# Patient Record
Sex: FEMALE | Race: OTHER | NOT HISPANIC OR LATINO | Employment: FULL TIME | URBAN - METROPOLITAN AREA
[De-identification: names, ages, dates, MRNs, and addresses within clinical notes are randomized per-mention and may not be internally consistent; named-entity substitution may affect disease eponyms.]

---

## 2017-04-13 ENCOUNTER — OFFICE VISIT (OUTPATIENT)
Dept: MEDICAL GROUP | Facility: MEDICAL CENTER | Age: 27
End: 2017-04-13
Payer: COMMERCIAL

## 2017-04-13 VITALS
SYSTOLIC BLOOD PRESSURE: 110 MMHG | BODY MASS INDEX: 24.59 KG/M2 | WEIGHT: 144 LBS | DIASTOLIC BLOOD PRESSURE: 70 MMHG | TEMPERATURE: 97.9 F | HEIGHT: 64 IN | OXYGEN SATURATION: 98 % | RESPIRATION RATE: 14 BRPM | HEART RATE: 60 BPM

## 2017-04-13 DIAGNOSIS — Z00.00 ANNUAL PHYSICAL EXAM: ICD-10-CM

## 2017-04-13 PROCEDURE — 90632 HEPA VACCINE ADULT IM: CPT | Performed by: PHYSICIAN ASSISTANT

## 2017-04-13 PROCEDURE — 99385 PREV VISIT NEW AGE 18-39: CPT | Mod: 25 | Performed by: PHYSICIAN ASSISTANT

## 2017-04-13 PROCEDURE — 90471 IMMUNIZATION ADMIN: CPT | Performed by: PHYSICIAN ASSISTANT

## 2017-04-13 ASSESSMENT — PATIENT HEALTH QUESTIONNAIRE - PHQ9: CLINICAL INTERPRETATION OF PHQ2 SCORE: 0

## 2017-04-13 NOTE — ASSESSMENT & PLAN NOTE
This is a 27-year-old female accompanied by her 5-year-old daughter. She is here today for some labs. There is a question about her vaccination status. She does not have her vaccination records. He is also requesting a QuantiFERON testing for TB. No complaints today. No significant medical history. Was born in the [facility].

## 2017-04-13 NOTE — PROGRESS NOTES
"Subjective:   Shara Horner is a 27 y.o. female here today for QuantiFERON order and status of vaccination.    Annual physical exam  This is a 27-year-old female accompanied by her 5-year-old daughter. She is here today for some labs. There is a question about her vaccination status. She does not have her vaccination records. He is also requesting a QuantiFERON testing for TB. No complaints today. No significant medical history. Was born in the [facility].       Current medicines (including changes today)  Current Outpatient Prescriptions   Medication Sig Dispense Refill   • Levonorgestrel (LILETTA, 52 MG,) 18.6 MCG/DAY IUD by Intrauterine route.       No current facility-administered medications for this visit.     She  has no past medical history of Allergy, Anemia, Anxiety, Arrhythmia, Arthritis, ASTHMA, Blood transfusion, Cancer (CMS-[facility]), Clotting disorder (CMS-[facility]), Depression, Diabetes, EMPHYSEMA, GERD (gastroesophageal reflux disease), Headache(784.0), Migraine, Heart attack (CMS-[facility]), Heart murmur, HIV (human immunodeficiency virus infection), Parathyroid disorder (CMS-[facility]), Thyroid disease, Hypertension, Kidney disease, OSTEOPOROSIS, Seizure (CMS-[facility]), Sickle cell disease (CMS-[facility]), Stroke (CMS-[facility]), Substance abuse, Tuberculosis, Ulcer (CMS-[facility]), Urinary tract infection, site not specified, Addisons disease (CMS-[facility]), Adrenal disorder (CMS-[facility]), CATARACT, CHF (congestive heart failure) (CMS-[facility]), COPD, Cushings syndrome (CMS-[facility]), Diabetic neuropathy (CMS-[facility]), Glaucoma, Goiter, Hyperlipidemia, IBD (inflammatory bowel disease), Meningitis, Muscle disorder, or Pituitary disease (CMS-[facility]).    ROS   No chest pain, no shortness of breath, no abdominal pain and all other systems were reviewed and are negative.       Objective:     Blood pressure 110/70, pulse 60, temperature 36.6 °C (97.9 °F), resp. rate 14, height 1.626 m (5' 4.02\"), weight 65.318 kg (144 lb), SpO2 98 %, not currently breastfeeding. Body " mass index is 24.71 kg/(m^2).   Physical Exam:  Constitutional: Alert, no distress.  Skin: Warm, dry, good turgor, no rashes in visible areas.  Eye: Equal, round and reactive, conjunctiva clear, lids normal.  ENMT: Lips without lesions, good dentition, oropharynx clear.  Neck: Trachea midline, no masses.   Respiratory: Unlabored respiratory effort, lungs appear clear, no wheezes.  Cardiovascular: Regular rate and rhythm.   Psych: Alert and oriented x3, normal affect and mood.    Vaccination records were pulled from [employer].  She is only missing hepatitis a #2.    Assessment and Plan:   The following treatment plan was discussed    1. Annual physical exam  Administered hepatitis A #2 into right deltoid without complaints. Ordered QuantiFERON testing. Provided a copy of her up-to-date vaccination records. Discussed with immunity status.  - Quantiferon Gold TB (PPD); Future  - Hep A Adult 19+      Followup: Return if symptoms worsen or fail to improve.    Please note that this dictation was created using voice recognition software. I have made every reasonable attempt to correct obvious errors, but I expect that there are errors of grammar and possibly content that I did not discover before finalizing the note.

## 2017-04-25 ENCOUNTER — TELEPHONE (OUTPATIENT)
Dept: MEDICAL GROUP | Facility: MEDICAL CENTER | Age: 27
End: 2017-04-25

## 2017-04-25 LAB
ANNOTATION COMMENT IMP: NORMAL
GAMMA INTERFERON BACKGROUND BLD IA-ACNC: 0.12 IU/ML
GAMMA INTERFERON BACKGROUND BLD IA-ACNC: NORMAL [IU]/ML
M TB IFN-G BLD-IMP: NEGATIVE
M TB IFN-G CD4+ BCKGRND COR BLD-ACNC: 0.02 IU/ML
M TB IFN-G CD4+ T-CELLS BLD-ACNC: 0.14 IU/ML
MITOGEN IGNF BLD-ACNC: >10 IU/ML
SERVICE CMNT-IMP: NORMAL

## 2017-04-25 NOTE — TELEPHONE ENCOUNTER
----- Message from Alvin Mcfarlane PA-C sent at 4/25/2017  2:03 PM PDT -----  Please contact patient and notify her that QuantiFERON testing is negative. Thank you.

## 2017-04-25 NOTE — Clinical Note
April 25, 2017        Shara ZarateIs  [id number]  [place] [address]        Dear Shara    Here are the results of your test(s):   QuantiFERON: negative    Comments:  Looks good!        Sincerely,        Maria E Mckinney, Medical Assistant for Denys Esquivel PA-C  Signed Electronically

## 2017-09-06 ENCOUNTER — HOSPITAL ENCOUNTER (EMERGENCY)
Facility: MEDICAL CENTER | Age: 27
End: 2017-09-07
Attending: EMERGENCY MEDICINE
Payer: MEDICAID

## 2017-09-06 DIAGNOSIS — N73.9 PID (PELVIC INFLAMMATORY DISEASE): ICD-10-CM

## 2017-09-06 DIAGNOSIS — M79.672 LEFT FOOT PAIN: ICD-10-CM

## 2017-09-06 LAB — TREPONEMA PALLIDUM IGG+IGM AB [PRESENCE] IN SERUM OR PLASMA BY IMMUNOASSAY: NON REACTIVE

## 2017-09-06 PROCEDURE — 86780 TREPONEMA PALLIDUM: CPT

## 2017-09-06 PROCEDURE — 87389 HIV-1 AG W/HIV-1&-2 AB AG IA: CPT

## 2017-09-06 PROCEDURE — 96372 THER/PROPH/DIAG INJ SC/IM: CPT

## 2017-09-06 PROCEDURE — 36415 COLL VENOUS BLD VENIPUNCTURE: CPT

## 2017-09-06 PROCEDURE — 99285 EMERGENCY DEPT VISIT HI MDM: CPT

## 2017-09-06 RX ORDER — CEFTRIAXONE SODIUM 250 MG/1
250 INJECTION, POWDER, FOR SOLUTION INTRAMUSCULAR; INTRAVENOUS ONCE
Status: COMPLETED | OUTPATIENT
Start: 2017-09-07 | End: 2017-09-07

## 2017-09-06 RX ORDER — AZITHROMYCIN 250 MG/1
250 TABLET, FILM COATED ORAL ONCE
Status: DISCONTINUED | OUTPATIENT
Start: 2017-09-07 | End: 2017-09-07

## 2017-09-06 RX ORDER — AZITHROMYCIN 250 MG/1
1000 TABLET, FILM COATED ORAL ONCE
Status: COMPLETED | OUTPATIENT
Start: 2017-09-07 | End: 2017-09-07

## 2017-09-07 VITALS
WEIGHT: 143.3 LBS | HEIGHT: 64 IN | OXYGEN SATURATION: 100 % | SYSTOLIC BLOOD PRESSURE: 118 MMHG | HEART RATE: 70 BPM | BODY MASS INDEX: 24.46 KG/M2 | DIASTOLIC BLOOD PRESSURE: 78 MMHG | RESPIRATION RATE: 14 BRPM | TEMPERATURE: 97.3 F

## 2017-09-07 LAB
BACTERIA GENITAL QL WET PREP: NORMAL
HIV 1+2 AB+HIV1 P24 AG SERPL QL IA: NON REACTIVE
SIGNIFICANT IND 70042: NORMAL
SITE SITE: NORMAL
SOURCE SOURCE: NORMAL

## 2017-09-07 PROCEDURE — A9270 NON-COVERED ITEM OR SERVICE: HCPCS | Performed by: EMERGENCY MEDICINE

## 2017-09-07 PROCEDURE — 700102 HCHG RX REV CODE 250 W/ 637 OVERRIDE(OP): Performed by: EMERGENCY MEDICINE

## 2017-09-07 PROCEDURE — 700111 HCHG RX REV CODE 636 W/ 250 OVERRIDE (IP): Performed by: EMERGENCY MEDICINE

## 2017-09-07 RX ORDER — DOXYCYCLINE 100 MG/1
100 CAPSULE ORAL 2 TIMES DAILY
Qty: 14 CAP | Refills: 0 | Status: SHIPPED | OUTPATIENT
Start: 2017-09-07 | End: 2017-09-14

## 2017-09-07 RX ADMIN — AZITHROMYCIN 1000 MG: 250 TABLET, FILM COATED ORAL at 00:11

## 2017-09-07 RX ADMIN — CEFTRIAXONE SODIUM 250 MG: 250 INJECTION, POWDER, FOR SOLUTION INTRAMUSCULAR; INTRAVENOUS at 00:10

## 2017-09-07 NOTE — DISCHARGE INSTRUCTIONS
Pelvic Inflammatory Disease  Pelvic inflammatory disease (PID) refers to an infection in some or all of the female organs. The infection can be in the uterus, ovaries, fallopian tubes, or the surrounding tissues in the pelvis. PID can cause abdominal or pelvic pain that comes on suddenly (acute pelvic pain). PID is a serious infection because it can lead to lasting (chronic) pelvic pain or the inability to have children (infertility).   CAUSES   The infection is often caused by the normal bacteria found in the vaginal tissues. PID may also be caused by an infection that is spread during sexual contact. PID can also occur following:   · The birth of a baby.    · A miscarriage.    · An .    · Major pelvic surgery.    · The use of an intrauterine device (IUD).    · A sexual assault.    RISK FACTORS  Certain factors can put a person at higher risk for PID, such as:  · Being younger than 25 years.  · Being sexually active at a young age.  · Using nonbarrier contraception.  · Having multiple sexual partners.  · Having sex with someone who has symptoms of a genital infection.  · Using oral contraception.  Other times, certain behaviors can increase the possibility of getting PID, such as:  · Having sex during your period.  · Using a vaginal douche.  · Having an intrauterine device (IUD) in place.  SYMPTOMS   · Abdominal or pelvic pain.    · Fever.    · Chills.    · Abnormal vaginal discharge.  · Abnormal uterine bleeding.    · Unusual pain shortly after finishing your period.  DIAGNOSIS   Your caregiver will choose some of the following methods to make a diagnosis, such as:   · Performing a physical exam and history. A pelvic exam typically reveals a very tender uterus and surrounding pelvis.    · Ordering laboratory tests including a pregnancy test, blood tests, and urine test.   · Ordering cultures of the vagina and cervix to check for a sexually transmitted infection (STI).  · Performing an ultrasound.     · Performing a laparoscopic procedure to look inside the pelvis.  · Examining vaginal secretions under a microscope.  TREATMENT   · Antibiotic medicines may be prescribed and taken by mouth.    · Sexual partners may be treated when the infection is caused by a sexually transmitted disease (STD).    · Hospitalization may be needed to give antibiotics intravenously.  · Surgery may be needed, but this is rare.  It may take weeks until you are completely well. If you are diagnosed with PID, you should also be checked for human immunodeficiency virus (HIV). Your health care provider may test you for infection again 3 months after treatment.  HOME CARE INSTRUCTIONS   · If given, take your antibiotics as directed. Finish the medicine even if you start to feel better.    · Only take over-the-counter or prescription medicines for pain, discomfort, or fever as directed by your caregiver.    · Do not have sexual intercourse until treatment is completed or as directed by your caregiver. If PID is confirmed, your recent sexual partner(s) will need treatment.    · Keep your follow-up appointments.  SEEK MEDICAL CARE IF:   · You have increased or abnormal vaginal discharge.    · You need prescription medicine for your pain.    · You vomit.    · You cannot take your medicines.    · Your partner has an STD.    SEEK IMMEDIATE MEDICAL CARE IF:   · You have a fever.    · You have increased abdominal or pelvic pain.    · You have chills.    · You have pain when you urinate.    · You are not better after 72 hours following treatment.    MAKE SURE YOU:   · Understand these instructions.  · Will watch your condition.  · Will get help right away if you are not doing well or get worse.     This information is not intended to replace advice given to you by your health care provider. Make sure you discuss any questions you have with your health care provider.     Document Released: 12/18/2006 Document Revised: 01/08/2016 Document Reviewed:  01/25/2016  Elsevier Interactive Patient Education ©2016 [employer]

## 2017-09-07 NOTE — ED NOTES
Pt was diagnosed with Chlamydia about 2 months ago, states she has been on and off of antibiotics.

## 2017-09-07 NOTE — ED NOTES
"Chief Complaint   Patient presents with   • Foot Pain   • Rash     Patient ambulatory to triage with staggered gait. Patient states that she woke up with throbbing left foot pain since yesterday. Pain has not gotten better or worse. Patient also c/o rash/blisters to back of neck. Patient has been taking tylenol and zyrtec without relief. /84   Pulse 72   Temp 36.3 °C (97.3 °F)   Resp 16   Ht 1.626 m (5' 4\")   Wt 65 kg (143 lb 4.8 oz)   SpO2 100%   BMI 24.60 kg/m²     "

## 2017-09-07 NOTE — ED PROVIDER NOTES
"ED Provider Note    CHIEF COMPLAINT  Chief Complaint   Patient presents with   • Foot Pain   • Rash       HPI  Shara Horner is a 27 y.o. female who presentsTo the emergency department with multiple complaints. Primarily the 1st is that she's been having on and off vaginal discharge since July, she was diagnosed with bacterial vaginosis as well as chlamydia, she states that she took a gram of azithromycin as done Flagyl twice was still continues to have vaginal discharge and irritation. The 2nd complaint is that she did have some dysuria but has been started on Macrobid and states that that is greatly improved. The 3rd is that she noticed some swelling in the back part of her neck that she thought was a rash so she took some allergy medications this morning but denies ever actually seeing a rash having any pain or trauma to the back part of her neck. The 4th complaint is that she is having left foot pain at the sole of the foot without any trauma. The patient does transport other patients back and forth but denies having poor shoes being on her feet too much or any trauma the area. She denies any weakness but states on off she gets a shooting numbness down the leg and then \"freaked out\" and worries that something is wrong. She currently states the pain on her plantar part of her foot is making it difficult for her to ambulate    REVIEW OF SYSTEMS  See HPI for further details. All other systems are negative.     PAST MEDICAL HISTORY       SOCIAL HISTORY  Social History     Social History Main Topics   • Smoking status: Never Smoker   • Smokeless tobacco: Never Used   • Alcohol use Yes      Comment: socially   • Drug use: No   • Sexual activity: Yes     Partners: Male     Birth control/ protection: IUD       SURGICAL HISTORY   has a past surgical history that includes primary c section (3/13/2012).    CURRENT MEDICATIONS  Home Medications     Reviewed by Sandy Viera R.N. (Registered Nurse) on 09/06/17 at " "2150  Med List Status: <None>   Medication Last Dose Status   Levonorgestrel (LILETTA, 52 MG,) 18.6 MCG/DAY IUD 4/13/2017 Active                ALLERGIES  No Known Allergies    PHYSICAL EXAM  VITAL SIGNS: /84   Pulse 72   Temp 36.3 °C (97.3 °F)   Resp 16   Ht 1.626 m (5' 4\")   Wt 65 kg (143 lb 4.8 oz)   SpO2 100%   BMI 24.60 kg/m²    Pulse ox interpretation: I interpret this pulse ox as normal.  Constitutional: Alert in no apparent distress.  HENT: Normocephalic, Atraumatic  Posterior LAD of the neck b/l L>R  Eyes: Pupils are equal and reactive. Conjunctiva normal, non-icteric.   Heart: Regular rate and rythm, no murmurs.    Lungs: Clear to auscultation bilaterally.  Abdomen: Non-tender, non-distended, normal bowel sounds  Skin: Warm, Dry, No erythema, No rash.   EXT: mild ttp of the L plantar surface of the foot below the large toe - DP pulses 2+ bilaterally no swelling no edema and sensation intact to light touch  Neurologic: Alert, Grossly non-focal. Symmetric smile, eyes shut tight bilaterally, forehead wrinkles bilaterally, sensation intact to light touch bilateral face, tongue midline, head turn and shoulder shrug with full strength. Hearing intact grossly bilaterally. 5/5  strength bilaterally, 5/5 tricep and bicep strength bilaterally. Sensation intact to light touch r, m, u, axillary nerves bilaterally. 5/5 strength quadricep, plantarflexion/dorsiflexion/extensor hallicus longus bilaterally. Sensation intact to light touch bilateral lower extremities in all nerve distributions. No clonus at ankle or elbow. Favoring the L foot - otherwise normal gait .        DIFFERENTIAL DIAGNOSIS AND WORK UP PLAN    This is a 27 y.o. female who presents with multiple complaints obviously she's been having unprotected sex with her posterior neck lymphadenopathy concern for possible HIV, and she is also having some on symptoms of numbness and pain in the left foot there is no signs or symptoms concerning for " "gonorrhea type disseminated infection leading to septic arthritis that she has no swelling of either the ankle or the knee joint. Possible concern for syphilis-like symptom in the setting of some neuropathy there is no signs of trauma or infection on the leg otherwise. She is requesting a repeat gynecology examination the setting of vaginal discharge possible Chlamydia gonorrhea bacterial vaginosis or yeast infection.    Pertinent Lab Findings  Negative RPR negative HIV were prepped with white cells apparently the GC CT was canceled even though swabs were taken  Labs Reviewed   T.PALLIDUM AB EIA   HIV ANTIBODIES   WET PREP       COURSE & MEDICAL DECISION MAKING  Pertinent Labs & Imaging studies reviewed. (See chart for details)    11:37 PM  Pelvic and vaginal exam    External:  No lesions, normal labia, clitoris  Vaginal vault:  No lesions.  Mod thick yellow/white  discharge, no bleeding.  Cervix:  + discharge  + CMT. No lesions.     12:30 AM  We discussed her [place] syphilis and HIV findings for all negative with a recent history of Chlamydia diagnosis and concern for continued discomfort to my examination the patient will be treated for PID discharged with doxycycline and treated here with Rocephin and azithromycin. She'll return to the ED for any new or worsening symptoms, she is able to ambulance does have some tenderness on the left foot without signs of infections there and again no signs of disseminated gonococcal disease such as joint infections or rashes. She feels comfortable going home will follow up here with her primary physician    /78   Pulse 70   Temp 36.3 °C (97.3 °F)   Resp 14   Ht 1.626 m (5' 4\")   Wt 65 kg (143 lb 4.8 oz)   SpO2 100%   BMI 24.60 kg/m²     The patient will return for new or worsening symptoms and is stable at the time of discharge.    The patient is referred to a primary physician for blood pressure management, diabetic screening, and for all other preventative " health concerns.    DISPOSITION:  Patient will be discharged home in stable condition.    FOLLOW UP:  Alvin Mcfarlane P.A.-C.  [address]  [address]  [facility] [id number] [phone]    Schedule an appointment as soon as possible for a visit      [facility], Emergency Dept  [address]  [place] [id number] [phone]    If symptoms worsen      OUTPATIENT MEDICATIONS:  Discharge Medication List as of 9/7/2017 12:38 AM      START taking these medications    Details   doxycycline (MONODOX) 100 MG capsule Take 1 Cap by mouth 2 times a day for 7 days., Disp-14 Cap, R-0, Normal           FINAL IMPRESSION  1. PID (pelvic inflammatory disease)    2. Left foot pain                 Electronically signed by: Lynette Delatorre, 9/6/2017 10:05 PM    This dictation has been created using voice recognition software and/or scribes. The accuracy of the dictation is limited by the abilities of the software and the expertise of the scribes. I expect there may be some errors of grammar and possibly content. I made every attempt to manually correct the errors within my dictation. However, errors related to voice recognition software and/or scribes may still exist and should be interpreted within the appropriate context.

## 2017-09-07 NOTE — ED NOTES
"Report from VELASQUEZ Delgado. Assuming pt care. Pt medicated per MAR by Sandy. Pt requesting \"something to help her walk.\" ERP notified. Waiting for labs/disposition.  "

## 2017-09-07 NOTE — ED NOTES
Crutches given by ED tech, pt able to demonstrate ability to use crutches appropriately. D/c instructions given, abx prescription sent to pharmacy, instructed pt to pick it up asap, educated on ortho care and abd pain control, addressed all questions/concerns, verbalized understanding. Pt ambulated out with crutches, home with family in stable condition.

## 2017-11-22 ENCOUNTER — OFFICE VISIT (OUTPATIENT)
Dept: MEDICAL GROUP | Facility: MEDICAL CENTER | Age: 27
End: 2017-11-22
Attending: INTERNAL MEDICINE
Payer: MEDICAID

## 2017-11-22 VITALS
SYSTOLIC BLOOD PRESSURE: 100 MMHG | WEIGHT: 140 LBS | DIASTOLIC BLOOD PRESSURE: 70 MMHG | TEMPERATURE: 97.9 F | BODY MASS INDEX: 23.9 KG/M2 | HEART RATE: 70 BPM | OXYGEN SATURATION: 96 % | RESPIRATION RATE: 16 BRPM | HEIGHT: 64 IN

## 2017-11-22 DIAGNOSIS — Z83.3 FAMILY HISTORY OF DIABETES MELLITUS TYPE II: ICD-10-CM

## 2017-11-22 DIAGNOSIS — Z30.011 ENCOUNTER FOR INITIAL PRESCRIPTION OF CONTRACEPTIVE PILLS: ICD-10-CM

## 2017-11-22 PROBLEM — Z00.00 ANNUAL PHYSICAL EXAM: Status: RESOLVED | Noted: 2017-04-13 | Resolved: 2017-11-22

## 2017-11-22 PROCEDURE — 99204 OFFICE O/P NEW MOD 45 MIN: CPT | Performed by: INTERNAL MEDICINE

## 2017-11-22 ASSESSMENT — PAIN SCALES - GENERAL: PAINLEVEL: NO PAIN

## 2017-11-23 NOTE — ASSESSMENT & PLAN NOTE
Patient is requesting a change in her oral contraceptive. She is currently taking a preparation that she feels is causing her to break out, and not feel well. She would like to try a different pill. Ultimately she would like to get an implant as she has trouble taking a pill daily She has had an IUD in the past but had it removed because it was low lying and causing her pain. She does not want to try any vaginal ring. We discussed birth control patches as well but she prefers to stick with the pill. She has had some bone loss in the past and was told she should not get Depo-Provera.

## 2017-11-23 NOTE — ASSESSMENT & PLAN NOTE
Patient reports that her father was diagnosed with diabetes around the same age as her. She would like to be screened for diabetes.

## 2017-11-23 NOTE — PROGRESS NOTES
Shara Horner is a 27 y.o. female here for birth control, requesting labs, establish care    HPI:   Family history of diabetes mellitus type II  Patient reports that her father was diagnosed with diabetes around the same age as her. She would like to be screened for diabetes.    Encounter for initial prescription of contraceptive pills  Patient is requesting a change in her oral contraceptive. She is currently taking a preparation that she feels is causing her to break out, and not feel well. She would like to try a different pill. Ultimately she would like to get an implant as she has trouble taking a pill daily She has had an IUD in the past but had it removed because it was low lying and causing her pain. She does not want to try any vaginal ring. We discussed birth control patches as well but she prefers to stick with the pill. She has had some bone loss in the past and was told she should not get Depo-Provera.    Current medicines (including changes today)  Current Outpatient Prescriptions   Medication Sig Dispense Refill   • norethindrone-ethinyl estradiol (ORTHO-NOVUM 1-35 TAB, NORTREL 1-35 TAB) 1-35 MG-MCG per tablet Take 1 Tab by mouth every day. 28 Tab 6     No current facility-administered medications for this visit.      She  has no past medical history on file.  She  has a past surgical history that includes primary c section (3/13/2012).  Social History   Substance Use Topics   • Smoking status: Never Smoker   • Smokeless tobacco: Never Used   • Alcohol use Yes      Comment: 3 drinks every few months     Social History     Social History Narrative   • No narrative on file     Family History   Problem Relation Age of Onset   • Diabetes Father      Insulin dependent   • Hypertension Sister    • Diabetes Maternal Grandmother 50     insulin dependent   • Cancer Neg Hx    • Heart Disease Neg Hx    • Stroke Neg Hx          ROS  As above in HPI  All other systems reviewed and are negative     Objective:  "    Blood pressure 100/70, pulse 70, temperature 36.6 °C (97.9 °F), resp. rate 16, height 1.626 m (5' 4\"), weight 63.5 kg (140 lb), SpO2 96 %, not currently breastfeeding. Body mass index is 24.03 kg/m².  Physical Exam:    Constitutional: Alert, no distress.  Skin: Warm, dry, good turgor, no rashes in visible areas.  Eye: Equal, round and reactive, conjunctiva clear, lids normal.  ENMT: Lips without lesions, good dentition, oropharynx clear, TM's clear bilaterally.  Neck: Trachea midline, no masses, no thyromegaly. No cervical or supraclavicular lymphadenopathy.  Respiratory: Unlabored respiratory effort, lungs clear to auscultation, no wheezes, no ronchi.  Cardiovascular: Normal S1, S2, no murmur, no edema.  Abdomen: Soft, non-tender, no masses, no hepatosplenomegaly.  Psych: Alert and oriented x3, normal affect and mood.        Assessment and Plan:   The following treatment plan was discussed    1. Family history of diabetes mellitus type II  - HEMOGLOBIN A1C; Future    2. Encounter for initial prescription of contraceptive pills  We will switch her to a triphasic birth control to see if she has less side effects. Ultimately, she will pursue an implant. I gave her the information for [facility] as well as the health department where she can have this done. No pregnancy test today as patient is currently taking an OCP regularly.  - norethindrone-ethinyl estradiol (ORTHO-NOVUM 1-35 TAB, NORTREL 1-35 TAB) 1-35 MG-MCG per tablet; Take 1 Tab by mouth every day.  Dispense: 28 Tab; Refill: 6    HCM: Gets Pap smears through Dr. Saleh, records requested.      Followup: Return in about 1 year (around 11/22/2018), or if symptoms worsen or fail to improve, for annual.         "

## 2018-01-12 ENCOUNTER — OFFICE VISIT (OUTPATIENT)
Dept: MEDICAL GROUP | Facility: MEDICAL CENTER | Age: 28
End: 2018-01-12
Attending: INTERNAL MEDICINE
Payer: MEDICAID

## 2018-01-12 VITALS
BODY MASS INDEX: 24.41 KG/M2 | OXYGEN SATURATION: 98 % | SYSTOLIC BLOOD PRESSURE: 102 MMHG | RESPIRATION RATE: 16 BRPM | HEIGHT: 64 IN | DIASTOLIC BLOOD PRESSURE: 70 MMHG | WEIGHT: 143 LBS | HEART RATE: 74 BPM | TEMPERATURE: 97.9 F

## 2018-01-12 DIAGNOSIS — R35.0 URINARY FREQUENCY: ICD-10-CM

## 2018-01-12 DIAGNOSIS — G44.52 NEW DAILY PERSISTENT HEADACHE: ICD-10-CM

## 2018-01-12 LAB
APPEARANCE UR: NORMAL
BILIRUB UR STRIP-MCNC: NORMAL MG/DL
COLOR UR AUTO: NORMAL
GLUCOSE UR STRIP.AUTO-MCNC: NEGATIVE MG/DL
KETONES UR STRIP.AUTO-MCNC: NEGATIVE MG/DL
LEUKOCYTE ESTERASE UR QL STRIP.AUTO: NEGATIVE
NITRITE UR QL STRIP.AUTO: NEGATIVE
PH UR STRIP.AUTO: 6 [PH] (ref 5–8)
PROT UR QL STRIP: NEGATIVE MG/DL
RBC UR QL AUTO: NEGATIVE
SP GR UR STRIP.AUTO: 1.03
UROBILINOGEN UR STRIP-MCNC: NEGATIVE MG/DL

## 2018-01-12 PROCEDURE — 81002 URINALYSIS NONAUTO W/O SCOPE: CPT | Performed by: INTERNAL MEDICINE

## 2018-01-12 PROCEDURE — 99214 OFFICE O/P EST MOD 30 MIN: CPT | Performed by: INTERNAL MEDICINE

## 2018-01-12 PROCEDURE — 99213 OFFICE O/P EST LOW 20 MIN: CPT | Performed by: INTERNAL MEDICINE

## 2018-01-12 RX ORDER — SUMATRIPTAN 50 MG/1
TABLET, FILM COATED ORAL
Qty: 5 TAB | Refills: 0 | Status: SHIPPED | OUTPATIENT
Start: 2018-01-12 | End: 2018-02-23

## 2018-01-12 ASSESSMENT — PAIN SCALES - GENERAL: PAINLEVEL: 5=MODERATE PAIN

## 2018-01-13 NOTE — PROGRESS NOTES
Subjective:   Shara Horner is a 27 y.o. female here today for persistent headaches for one month, abnormal urine test at school    New daily persistent headache  Patient reports that she started having headaches about 3 months ago. She had never really experienced headaches in the past. Initially they were rare and would go away on their own. However, over the past month she complains of increased frequency of headaches up to daily. They usually start in the morning when she wakes up, subside a little bit by mid-day, and worsening again at night before bed. They do not wake her up from sleep. The pain is mostly located on the left side and is throbbing in character although it sometimes is on the right side as well. She denies any nasal discharge, upper respiratory infection, or sinus issues. States that she is still sleeping well getting about 8 hours a night. When she has headaches, she denies associated nausea, vomiting, or photophobia. She has no history of migraine headaches and no family history of migraine headaches. She has been taking some ibuprofen and some days for the headache up to 1000 mg at a time which does make the pain better for a couple of hours. She also reports that her vision has worsened over the past 3-4 months. She has noticed that she can no longer drive in the daytime without her glasses and has started wearing them all the time while driving instead of just at night. She has not been back to see her optometrist or have her prescription reevaluated although she does feel like her sight is worsening since her last checkup in June.     Urinary frequency  Patient reports she has been urinating more frequently lately although she has also been drinking more water. 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 She denies dysuria or urgency. She denies suprapubic pain, fevers, chills, back  "pain. She denies hematuria.       Current medicines (including changes today)  Current Outpatient Prescriptions   Medication Sig Dispense Refill   • sumatriptan (IMITREX) 50 MG Tab Take 1-2 tabs daily as needed for headache 5 Tab 0   • norethindrone-ethinyl estradiol (ORTHO-NOVUM 1-35 TAB, NORTREL 1-35 TAB) 1-35 MG-MCG per tablet Take 1 Tab by mouth every day. 28 Tab 6     No current facility-administered medications for this visit.      She  has no past medical history on file.    ROS   As above in HPI     Objective:     Blood pressure 102/70, pulse 74, temperature 36.6 °C (97.9 °F), resp. rate 16, height 1.626 m (5' 4\"), weight 64.9 kg (143 lb), SpO2 98 %, not currently breastfeeding. Body mass index is 24.55 kg/m².   Physical Exam:  Constitutional: Alert, no distress.  Skin: Warm, dry, good turgor, no rashes in visible areas.  Eye: Equal, round and reactive, conjunctiva clear, lids normal.  ENMT: Lips without lesions, good dentition, oropharynx clear, TM's clear bilaterally, no tenderness to palpation over frontal or maxillary sinuses bilaterally  Neuro: CN II-XII grossly intact.  Patient experienced some pain with extremes of lateral gaze similar to her headache pain  Psych: Alert and oriented x3, normal affect and mood.    POC UA done in clinic today was negative for nitrate, leuk esterase, blood    Assessment and Plan:   The following treatment plan was discussed    1. New daily persistent headache  Differential includes secondary to ocular symptoms, tension headache, atypical migraine, intracranial mass lesion, less likely sinusitis. Given the new onset of the headaches and they're increased in severity in a person who never had headaches, I have ordered a CT head to evaluate for any intracranial lesions. I also recommended to the patient that she have her glasses prescription checked as soon as possible. I have given her some sumatriptan and try when the headache is severe to see if maybe this is an atypical " migraine and might respond.  - CT-HEAD W/O; Future  - sumatriptan (IMITREX) 50 MG Tab; Take 1-2 tabs daily as needed for headache  Dispense: 5 Tab; Refill: 0  -Patient will follow up with her optometrist regarding her current glasses prescription    2. Urinary frequency  POC UA in clinic today was negative. Suspect increased frequency is related to increased water intake. Patient was reassured, as she has no other symptoms of UTI.  - POCT Urinalysis      Followup: Return in about 4 weeks (around 2/9/2018), or if symptoms worsen or fail to improve, for headache.  Follow up interval based on CT head, will call patient/mychart with results.

## 2018-01-13 NOTE — ASSESSMENT & PLAN NOTE
Patient reports that she started having headaches about 3 months ago. She had never really experienced headaches in the past. Initially they were rare and would go away on their own. However, over the past month she complains of increased frequency of headaches up to daily. They usually start in the morning when she wakes up, subside a little bit by mid-day, and worsening again at night before bed. They do not wake her up from sleep. The pain is mostly located on the left side and is throbbing in character although it sometimes is on the right side as well. She denies any nasal discharge, upper respiratory infection, or sinus issues. States that she is still sleeping well getting about 8 hours a night. When she has headaches, she denies associated nausea, vomiting, or photophobia. She has no history of migraine headaches and no family history of migraine headaches. She has been taking some ibuprofen and some days for the headache up to 1000 mg at a time which does make the pain better for a couple of hours. She also reports that her vision has worsened over the past 3-4 months. She has noticed that she can no longer drive in the daytime without her glasses and has started wearing them all the time while driving instead of just at night. She has not been back to see her optometrist or have her prescription reevaluated although she does feel like her sight is worsening since her last checkup in June.

## 2018-01-13 NOTE — ASSESSMENT & PLAN NOTE
Patient reports she has been urinating more frequently lately although she has also been drinking more water. 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 She denies dysuria or urgency. She denies suprapubic pain, fevers, chills, back pain. She denies hematuria.

## 2018-01-24 ENCOUNTER — HOSPITAL ENCOUNTER (OUTPATIENT)
Dept: RADIOLOGY | Facility: MEDICAL CENTER | Age: 28
End: 2018-01-24
Attending: INTERNAL MEDICINE
Payer: MEDICAID

## 2018-01-24 DIAGNOSIS — G44.52 NEW DAILY PERSISTENT HEADACHE: ICD-10-CM

## 2018-01-24 PROCEDURE — 70450 CT HEAD/BRAIN W/O DYE: CPT

## 2018-02-23 ENCOUNTER — OFFICE VISIT (OUTPATIENT)
Dept: MEDICAL GROUP | Facility: MEDICAL CENTER | Age: 28
End: 2018-02-23
Attending: INTERNAL MEDICINE
Payer: MEDICAID

## 2018-02-23 VITALS
DIASTOLIC BLOOD PRESSURE: 70 MMHG | HEART RATE: 90 BPM | HEIGHT: 64 IN | TEMPERATURE: 98 F | SYSTOLIC BLOOD PRESSURE: 122 MMHG | WEIGHT: 146 LBS | OXYGEN SATURATION: 98 % | BODY MASS INDEX: 24.92 KG/M2 | RESPIRATION RATE: 16 BRPM

## 2018-02-23 DIAGNOSIS — G44.52 NEW DAILY PERSISTENT HEADACHE: ICD-10-CM

## 2018-02-23 DIAGNOSIS — J20.8 ACUTE BRONCHITIS DUE TO OTHER SPECIFIED ORGANISMS: ICD-10-CM

## 2018-02-23 PROBLEM — R35.0 URINARY FREQUENCY: Status: RESOLVED | Noted: 2018-01-12 | Resolved: 2018-02-23

## 2018-02-23 PROCEDURE — 99212 OFFICE O/P EST SF 10 MIN: CPT | Performed by: INTERNAL MEDICINE

## 2018-02-23 PROCEDURE — 99214 OFFICE O/P EST MOD 30 MIN: CPT | Performed by: INTERNAL MEDICINE

## 2018-02-23 RX ORDER — BENZONATATE 100 MG/1
100 CAPSULE ORAL 3 TIMES DAILY PRN
Qty: 30 CAP | Refills: 0 | Status: SHIPPED | OUTPATIENT
Start: 2018-02-23 | End: 2018-08-21

## 2018-02-23 RX ORDER — AZITHROMYCIN 250 MG/1
TABLET, FILM COATED ORAL
Qty: 6 TAB | Refills: 0 | Status: SHIPPED | OUTPATIENT
Start: 2018-02-23 | End: 2018-03-30

## 2018-02-23 ASSESSMENT — PAIN SCALES - GENERAL: PAINLEVEL: 3=SLIGHT PAIN

## 2018-02-24 NOTE — ASSESSMENT & PLAN NOTE
Patient reports for the past 3 weeks she has had a persistent cough. She complains of 2 weeks of fevers and chills at night. She also complains of myalgias. Multiple people have been sick in her household. She complains of coughing so hard that a small amount of blood is coming up in the sputum. She denies shortness of breath.

## 2018-02-24 NOTE — ASSESSMENT & PLAN NOTE
Reviewed CT head which was normal.  She tried the sumatriptan once but felt like it made her too drowsy and did not help with the headache. She has just been taking ibuprofen as needed. She recently got a new glasses prescription as well. She just picked up the classes today so is not sure how helpful this will be.

## 2018-02-24 NOTE — PROGRESS NOTES
"Subjective:   Shara Horner is a 27 y.o. female here today for cough x 3 weeks, follow up headaches    Acute bronchitis due to other specified organisms  Patient reports for the past 3 weeks she has had a persistent cough. She complains of 2 weeks of fevers and chills at night. She also complains of myalgias. Multiple people have been sick in her household. She complains of coughing so hard that a small amount of blood is coming up in the sputum. She denies shortness of breath.    New daily persistent headache  Reviewed CT head which was normal.  She tried the sumatriptan once but felt like it made her too drowsy and did not help with the headache. She has just been taking ibuprofen as needed. She recently got a new glasses prescription as well. She just picked up the classes today so is not sure how helpful this will be.       Current medicines (including changes today)  Current Outpatient Prescriptions   Medication Sig Dispense Refill   • benzonatate (TESSALON) 100 MG Cap Take 1 Cap by mouth 3 times a day as needed for Cough. 30 Cap 0   • azithromycin (ZITHROMAX) 250 MG Tab Take 2 tabs by mouth on day one then 1 tab on days 2-5 6 Tab 0   • norethindrone-ethinyl estradiol (ORTHO-NOVUM 1-35 TAB, NORTREL 1-35 TAB) 1-35 MG-MCG per tablet Take 1 Tab by mouth every day. 28 Tab 6     No current facility-administered medications for this visit.      She  has no past medical history on file.    ROS   As above in HPI     Objective:     Blood pressure 122/70, pulse 90, temperature 36.7 °C (98 °F), resp. rate 16, height 1.626 m (5' 4.02\"), weight 66.2 kg (146 lb), SpO2 98 %, not currently breastfeeding. Body mass index is 25.05 kg/m².   Physical Exam:  Constitutional: Alert, no distress.  Skin: Warm, dry, good turgor, no rashes in visible areas.  Eye: Equal, round and reactive, conjunctiva clear, lids normal.  ENMT: Lips without lesions, good dentition, oropharynx mildly injected, TM's clear bilaterally  Neck: Trachea " midline, no masses, no thyromegaly. No cervical or supraclavicular lymphadenopathy  Respiratory: Unlabored respiratory effort, lungs clear to auscultation, no wheezes, no ronchi.  Cardiovascular: Regular rate and rhythm, no murmurs appreciated      Assessment and Plan:   The following treatment plan was discussed    1. Acute bronchitis due to other specified organisms  Given persistence of symptoms, recent small hemoptysis, will treat with antibiotics.    - benzonatate (TESSALON) 100 MG Cap; Take 1 Cap by mouth 3 times a day as needed for Cough.  Dispense: 30 Cap; Refill: 0  - azithromycin (ZITHROMAX) 250 MG Tab; Take 2 tabs by mouth on day one then 1 tab on days 2-5  Dispense: 6 Tab; Refill: 0    2. New daily persistent headache  Stable with PRN ibuprofen.  Negative CT head.  Did not tolerate sumatriptan.  -cont ibuprofen as needed        Followup: Return if symptoms worsen or fail to improve.

## 2018-03-30 ENCOUNTER — OFFICE VISIT (OUTPATIENT)
Dept: MEDICAL GROUP | Facility: MEDICAL CENTER | Age: 28
End: 2018-03-30
Attending: INTERNAL MEDICINE
Payer: MEDICAID

## 2018-03-30 VITALS
OXYGEN SATURATION: 98 % | RESPIRATION RATE: 16 BRPM | WEIGHT: 140 LBS | SYSTOLIC BLOOD PRESSURE: 110 MMHG | HEART RATE: 90 BPM | HEIGHT: 64 IN | BODY MASS INDEX: 23.9 KG/M2 | DIASTOLIC BLOOD PRESSURE: 70 MMHG | TEMPERATURE: 98.1 F

## 2018-03-30 DIAGNOSIS — R05.3 PERSISTENT COUGH: ICD-10-CM

## 2018-03-30 DIAGNOSIS — M54.50 ACUTE BILATERAL LOW BACK PAIN WITHOUT SCIATICA: ICD-10-CM

## 2018-03-30 PROCEDURE — 99214 OFFICE O/P EST MOD 30 MIN: CPT | Performed by: INTERNAL MEDICINE

## 2018-03-30 PROCEDURE — 99212 OFFICE O/P EST SF 10 MIN: CPT | Performed by: INTERNAL MEDICINE

## 2018-03-30 RX ORDER — MELOXICAM 15 MG/1
TABLET ORAL
Qty: 30 TAB | Refills: 0 | Status: SHIPPED | OUTPATIENT
Start: 2018-03-30 | End: 2018-05-08 | Stop reason: SDUPTHER

## 2018-03-30 ASSESSMENT — PAIN SCALES - GENERAL: PAINLEVEL: 3=SLIGHT PAIN

## 2018-03-30 NOTE — ASSESSMENT & PLAN NOTE
Reports bilateral low back pain for the past 3 weeks.  Denies trauma prior to the pain starting.  Sitting and standing worsen the pain.  Denies numbness/tingling/radiation of pain down legs but she does feel it in her tailbone.  Has had similar pain in the past but it has never lasted this long.  She has been trying to stretch and taking ibuprofen 200 mg QID which helps a little.  Also using ice and heat which are not helping.

## 2018-03-31 PROBLEM — R05.3 PERSISTENT COUGH: Status: ACTIVE | Noted: 2018-02-23

## 2018-03-31 NOTE — PROGRESS NOTES
"Subjective:   Shara Horner is a 28 y.o. female here today for back pain x 3 weeks, continued cough    Acute bilateral low back pain without sciatica  Reports bilateral low back pain for the past 3 weeks.  Denies trauma prior to the pain starting.  Sitting and standing worsen the pain.  Denies numbness/tingling/radiation of pain down legs but she does feel it in her tailbone.  Has had similar pain in the past but it has never lasted this long.  She has been trying to stretch and taking ibuprofen 200 mg QID which helps a little.  Also using ice and heat which are not helping.      Persistent cough  Continues to complain of daily cough.  Was treated for acute bronchitis near the end of feb and reports improvement in all other symptoms.  Is takign OTC nyquill and mucinex as well as cough drops.  Coughing is worsening her back pain.  Cough is dry.  Denies fevers, chills, shortness of breath.  Has tried the tessalon for the past 2 days TID without much improvement.       Current medicines (including changes today)  Current Outpatient Prescriptions   Medication Sig Dispense Refill   • meloxicam (MOBIC) 15 MG tablet Take 1/2 to 1 full tab daily as needed for pain 30 Tab 0   • benzonatate (TESSALON) 100 MG Cap Take 1 Cap by mouth 3 times a day as needed for Cough. 30 Cap 0   • norethindrone-ethinyl estradiol (ORTHO-NOVUM 1-35 TAB, NORTREL 1-35 TAB) 1-35 MG-MCG per tablet Take 1 Tab by mouth every day. 28 Tab 6     No current facility-administered medications for this visit.      She  has no past medical history on file.    ROS   Denies abdominal pain, chest pain  As above in HPI     Objective:     Blood pressure 110/70, pulse 90, temperature 36.7 °C (98.1 °F), resp. rate 16, height 1.626 m (5' 4.02\"), weight 63.5 kg (140 lb), SpO2 98 %, not currently breastfeeding. Body mass index is 24.02 kg/m².   Physical Exam:  Constitutional: Alert, no distress.  Skin: Warm, dry, good turgor, no rashes in visible areas.  Eye: Equal, " round and reactive, conjunctiva clear, lids normal.  Respiratory: Unlabored respiratory effort, lungs clear to auscultation, no wheezes, no ronchi.  MSK: no tenderness to palpation over lumbar paraspinal muscles or lumbar spine, SLR produces pain at about 45 degrees bilaterally without radiation down legs.  Neuro: 5/5 BLE strength, 1+ bilateral patellar refelxes      Assessment and Plan:   The following treatment plan was discussed    1. Acute bilateral low back pain without sciatica  No red flag symptoms on history or exam to prompt urgent imaging.  Will start on meloxicam and have her use daily for the next 7-10 days then as needed.  PT referral placed.  Patient declines muscle relaxer.  - meloxicam (MOBIC) 15 MG tablet; Take 1/2 to 1 full tab daily as needed for pain  Dispense: 30 Tab; Refill: 0  - REFERRAL TO PHYSICAL THERAPY Reason for Therapy: Eval/Treat/Report    2. Persistent cough  No evidence of pneumonia, suspect related to continued vocal cord irritation in setting of recent bronchitis.  Advised taking 200 mg tessalon TID, Robitussin DM        Followup: Return if symptoms worsen or fail to improve.

## 2018-03-31 NOTE — ASSESSMENT & PLAN NOTE
Continues to complain of daily cough.  Was treated for acute bronchitis near the end of feb and reports improvement in all other symptoms.  Is takign OTC nyquill and mucinex as well as cough drops.  Coughing is worsening her back pain.  Cough is dry.  Denies fevers, chills, shortness of breath.  Has tried the tessalon for the past 2 days TID without much improvement.

## 2018-05-08 DIAGNOSIS — M54.50 ACUTE BILATERAL LOW BACK PAIN WITHOUT SCIATICA: ICD-10-CM

## 2018-05-08 RX ORDER — MELOXICAM 15 MG/1
TABLET ORAL
Qty: 30 TAB | Refills: 1 | Status: SHIPPED | OUTPATIENT
Start: 2018-05-08 | End: 2018-05-21 | Stop reason: SDUPTHER

## 2018-05-11 ENCOUNTER — OFFICE VISIT (OUTPATIENT)
Dept: MEDICAL GROUP | Facility: MEDICAL CENTER | Age: 28
End: 2018-05-11
Attending: INTERNAL MEDICINE
Payer: MEDICAID

## 2018-05-11 VITALS
SYSTOLIC BLOOD PRESSURE: 112 MMHG | BODY MASS INDEX: 23.9 KG/M2 | DIASTOLIC BLOOD PRESSURE: 74 MMHG | RESPIRATION RATE: 16 BRPM | HEIGHT: 64 IN | OXYGEN SATURATION: 98 % | WEIGHT: 140 LBS | TEMPERATURE: 98.7 F | HEART RATE: 100 BPM

## 2018-05-11 DIAGNOSIS — R30.0 DYSURIA: ICD-10-CM

## 2018-05-11 DIAGNOSIS — N89.8 VAGINAL DISCHARGE: ICD-10-CM

## 2018-05-11 LAB
APPEARANCE UR: CLEAR
BILIRUB UR STRIP-MCNC: NEGATIVE MG/DL
COLOR UR AUTO: NORMAL
GLUCOSE UR STRIP.AUTO-MCNC: NORMAL MG/DL
KETONES UR STRIP.AUTO-MCNC: NORMAL MG/DL
LEUKOCYTE ESTERASE UR QL STRIP.AUTO: NEGATIVE
NITRITE UR QL STRIP.AUTO: NEGATIVE
PH UR STRIP.AUTO: 6 [PH] (ref 5–8)
PROT UR QL STRIP: NEGATIVE MG/DL
RBC UR QL AUTO: NORMAL
SP GR UR STRIP.AUTO: 1.02
UROBILINOGEN UR STRIP-MCNC: NORMAL MG/DL

## 2018-05-11 PROCEDURE — 99213 OFFICE O/P EST LOW 20 MIN: CPT | Performed by: INTERNAL MEDICINE

## 2018-05-11 PROCEDURE — 81002 URINALYSIS NONAUTO W/O SCOPE: CPT | Performed by: INTERNAL MEDICINE

## 2018-05-11 PROCEDURE — 99214 OFFICE O/P EST MOD 30 MIN: CPT | Performed by: INTERNAL MEDICINE

## 2018-05-11 ASSESSMENT — PAIN SCALES - GENERAL: PAINLEVEL: 3=SLIGHT PAIN

## 2018-05-12 NOTE — ASSESSMENT & PLAN NOTE
Patient reports mild burning with urination for the past two months off and on.  She denies hematuria.  Complains of intermittent lower abdominal pain.  Denies fevers, chills.

## 2018-05-12 NOTE — ASSESSMENT & PLAN NOTE
"Complains of vaginal discharge off and on for the past 2 months.  It is sometimes enough to cause her to wear a panty liner.  Describes it as \"thick and stringy\".  She is currently sexually active with one male partner, her boyfriend of 5 years.  She does not regularly use condoms.  Denies dyspareunia, fevers/chills.    "

## 2018-05-12 NOTE — PROGRESS NOTES
"Subjective:   Shara Horner is a 28 y.o. female here today for vaginal discharge, requesting STD testing    Dysuria  Patient reports mild burning with urination for the past two months off and on.  She denies hematuria.  Complains of intermittent lower abdominal pain.  Denies fevers, chills.    Vaginal discharge  Complains of vaginal discharge off and on for the past 2 months.  It is sometimes enough to cause her to wear a panty liner.  Describes it as \"thick and stringy\".  She is currently sexually active with one male partner, her boyfriend of 5 years.  She does not regularly use condoms.  Denies dyspareunia, fevers/chills.         Current medicines (including changes today)  Current Outpatient Prescriptions   Medication Sig Dispense Refill   • meloxicam (MOBIC) 15 MG tablet Take 1/2 to 1 full tab daily as needed for pain 30 Tab 1   • benzonatate (TESSALON) 100 MG Cap Take 1 Cap by mouth 3 times a day as needed for Cough. 30 Cap 0   • norethindrone-ethinyl estradiol (ORTHO-NOVUM 1-35 TAB, NORTREL 1-35 TAB) 1-35 MG-MCG per tablet Take 1 Tab by mouth every day. 28 Tab 6     No current facility-administered medications for this visit.      She  has no past medical history on file.    ROS   As above in HPI     Objective:     Blood pressure 112/74, pulse 100, temperature 37.1 °C (98.7 °F), resp. rate 16, height 1.626 m (5' 4.02\"), weight 63.5 kg (140 lb), SpO2 98 %, not currently breastfeeding. Body mass index is 24.02 kg/m².   Physical Exam:  Constitutional: Alert, no distress.  Skin: Warm, dry, good turgor, no rashes in visible areas.  Eye: Equal, round and reactive, conjunctiva clear, lids normal.  Abdomen: Soft, mild tenderness to palpation in suprapubic region without rebound or guarding, no masses, no hepatosplenomegaly.  Back: no CVA tenderness    Results and Imaging:   POC UA in clinic today was normal    Assessment and Plan:   The following treatment plan was discussed    1. Dysuria  No signs of UTI on UA " today.  May be related to dehydration as urine spec gravity was high.  May also be due to a vaginitis which we have tested for as below.  - POCT Urinalysis    2. Vaginal discharge  - VAGINAL PATHOGENS DNA PANEL; Future  - CHLAMYDIA/GC PCR URINE OR SWAB; Future    Prefers results by mychart if normal.      Followup: Return if symptoms worsen or fail to improve.

## 2018-05-17 RX ORDER — METRONIDAZOLE 500 MG/1
500 TABLET ORAL 2 TIMES DAILY
Qty: 14 TAB | Refills: 0 | Status: SHIPPED | OUTPATIENT
Start: 2018-05-17 | End: 2018-05-24

## 2018-05-21 DIAGNOSIS — M54.50 ACUTE BILATERAL LOW BACK PAIN WITHOUT SCIATICA: ICD-10-CM

## 2018-05-21 NOTE — TELEPHONE ENCOUNTER
Was the patient seen in the last year in this department? Yes     Does patient have an active prescription for medications requested? No     Received Request Via: Pharmacy   Future Appointments       Provider Department [place]    5/21/2018 10:44 AM Magdalena Masterson M.D. [facility]

## 2018-05-21 NOTE — PROGRESS NOTES
1. Caller Name: Shara                                         Call Back Number: [phone] (home)         Patient approves a detailed voicemail message: N\A    Patient called back about results. Results were relayed to patient, and informed of Rx waiting at pharmacy.

## 2018-05-22 RX ORDER — MELOXICAM 15 MG/1
TABLET ORAL
Qty: 30 TAB | Refills: 0 | Status: SHIPPED | OUTPATIENT
Start: 2018-05-22 | End: 2018-08-09 | Stop reason: SDUPTHER

## 2018-06-14 DIAGNOSIS — Z30.011 ENCOUNTER FOR INITIAL PRESCRIPTION OF CONTRACEPTIVE PILLS: ICD-10-CM

## 2018-06-29 ENCOUNTER — TELEPHONE (OUTPATIENT)
Dept: MEDICAL GROUP | Facility: MEDICAL CENTER | Age: 28
End: 2018-06-29

## 2018-07-10 ENCOUNTER — PATIENT MESSAGE (OUTPATIENT)
Dept: MEDICAL GROUP | Facility: MEDICAL CENTER | Age: 28
End: 2018-07-10

## 2018-07-11 ENCOUNTER — PATIENT MESSAGE (OUTPATIENT)
Dept: MEDICAL GROUP | Facility: MEDICAL CENTER | Age: 28
End: 2018-07-11

## 2018-07-11 DIAGNOSIS — N89.8 VAGINAL DISCHARGE: ICD-10-CM

## 2018-07-12 NOTE — TELEPHONE ENCOUNTER
From: Shara ZarateIs  To: Magdalena Masterson M.D.  Sent: 7/11/2018 11:26 PM PDT  Subject: Non-Urgent Medical Question    Thank you for replying. Yes, I took the medication you sent me for 7 days. Even after that, I am still having heavy yellow, sometimes yellow-green color discharge up until now. I spoke to Kody about it, and that’s why I was wondering what should I do next, and he sent me a voicemail saying that the office will Mail me the lab order, but I never received it, which is why I also called back on 6/14/18. However, Kody said he doesn’t remember that voice message, so I left a voicemail almost 2 weeks ago, but never heard anything back. I just want to know why this discharge isn’t going away, and it’s been happening for s very long time now.     Thanks!   ----- Message -----  From: Magdalena Masterson M.D.  Sent: 7/11/2018 5:51 PM PDT  To: MIKALA Horner  Subject: RE: Non-Urgent Medical Question  Hi Mikala,  It looks like back in May you had bacterial vaginosis and I believe we treated you with metronidazole for 7 days. Did you get the medication and take it? Are you still having the same discharge? I apologize for the frustration and confusion. Vaginal discharge is unrelated to the kidney function, so you don't need to worry about a kidney issue. When we tested you in may, everything else came back normal. If you are still having the discharge but did not get the medication, let me know and I will send the script over again. If you did take the medication, we may want to retest you. Let me know, and we can mail out the lab order.    Take care,  Magdalena Masterson M.D.      ----- Message -----   From: MIKALA Horner   Sent: 7/10/2018 11:55 AM PDT   To: Magdalena Masterson M.D.  Subject: Non-Urgent Medical Question    Hi! I've received a VM from Kody on 6/14/18 about sending me a lab order to do via mail rather than coming in to see you. I never received it, so I called back the following week. However,  Kody left me another VM on 6/28/18 saying that he doesn't know what lab order that I'm supposed to do. I came in last time regarding about discharge and up until now, I'm still having the same issues. I'm a little frustrated and worried because I haven't heard anything from you guys and it's been almost a month now. I'm worried that something is wrong with my kidney and other things down there. Please get me at me ASAP. Thank you.

## 2018-07-27 ENCOUNTER — TELEPHONE (OUTPATIENT)
Dept: MEDICAL GROUP | Facility: MEDICAL CENTER | Age: 28
End: 2018-07-27

## 2018-08-09 DIAGNOSIS — M54.50 ACUTE BILATERAL LOW BACK PAIN WITHOUT SCIATICA: ICD-10-CM

## 2018-08-10 RX ORDER — MELOXICAM 15 MG/1
TABLET ORAL
Qty: 30 TAB | Refills: 3 | Status: SHIPPED | OUTPATIENT
Start: 2018-08-10 | End: 2018-08-21

## 2018-08-10 NOTE — TELEPHONE ENCOUNTER
Pt called asking about test results, explained there has been a delay due to [facility] having received the sample first and then forwarded the sample to [facility] on 7-31-18, Pt asked about Birthcontrol, Pt was advised that if she wants to  more than a month at a [facility] she would need to pay out of pocket.  Was the patient seen in the last year in this department? Yes    Does patient have an active prescription for medications requested? Yes    Received Request Via: Patient

## 2018-08-21 ENCOUNTER — OFFICE VISIT (OUTPATIENT)
Dept: MEDICAL GROUP | Facility: MEDICAL CENTER | Age: 28
End: 2018-08-21
Payer: COMMERCIAL

## 2018-08-21 VITALS
WEIGHT: 143.74 LBS | HEART RATE: 68 BPM | BODY MASS INDEX: 24.54 KG/M2 | TEMPERATURE: 98.5 F | HEIGHT: 64 IN | SYSTOLIC BLOOD PRESSURE: 92 MMHG | OXYGEN SATURATION: 96 % | DIASTOLIC BLOOD PRESSURE: 62 MMHG

## 2018-08-21 DIAGNOSIS — Z30.011 ENCOUNTER FOR INITIAL PRESCRIPTION OF CONTRACEPTIVE PILLS: ICD-10-CM

## 2018-08-21 DIAGNOSIS — R05.9 COUGH: ICD-10-CM

## 2018-08-21 DIAGNOSIS — M54.50 ACUTE BILATERAL LOW BACK PAIN WITHOUT SCIATICA: ICD-10-CM

## 2018-08-21 PROBLEM — R30.0 DYSURIA: Status: RESOLVED | Noted: 2018-05-11 | Resolved: 2018-08-21

## 2018-08-21 PROBLEM — R05.3 PERSISTENT COUGH: Status: RESOLVED | Noted: 2018-02-23 | Resolved: 2018-08-21

## 2018-08-21 PROBLEM — N89.8 VAGINAL DISCHARGE: Status: RESOLVED | Noted: 2018-05-11 | Resolved: 2018-08-21

## 2018-08-21 PROCEDURE — 99214 OFFICE O/P EST MOD 30 MIN: CPT | Performed by: PHYSICIAN ASSISTANT

## 2018-08-21 RX ORDER — PROMETHAZINE HYDROCHLORIDE AND CODEINE PHOSPHATE 6.25; 1 MG/5ML; MG/5ML
5 SYRUP ORAL NIGHTLY PRN
Qty: 160 ML | Refills: 0 | Status: SHIPPED | OUTPATIENT
Start: 2018-08-21 | End: 2018-09-20

## 2018-08-21 ASSESSMENT — PATIENT HEALTH QUESTIONNAIRE - PHQ9: CLINICAL INTERPRETATION OF PHQ2 SCORE: 0

## 2018-08-22 NOTE — ASSESSMENT & PLAN NOTE
Had a cough worse for 3 weeks. Has a chronic cough intermittently which usually affects her at nighttime. Denies any current fevers or chest pain. No shortness of breath. Complains of congestion in the throat. No wheezing. Took some over-the-counter cough syrup. Tesjudy Zhanges the past without any improvement.

## 2018-08-22 NOTE — PROGRESS NOTES
"Subjective:   Shara Horner is a 28 y.o. female here today for OCP and cough.    Encounter for initial prescription of contraceptive pills  This is a 28-year-old female was here today to reestablish care with me. She also is requesting a refill of her birth control. She is leaving for [place] needs a 3 month supply. She is doing well on the medication. No side effects.    Cough  Had a cough worse for 3 weeks. Has a chronic cough intermittently which usually affects her at nighttime. Denies any current fevers or chest pain. No shortness of breath. Complains of congestion in the throat. No wheezing. Took some over-the-counter cough syrup. Tessalon Perles the past without any improvement.    Acute bilateral low back pain without sciatica  Has chronic low back pain which exacerbates from time to time. No sciatica. Went to PT and was told to perform her exercises. She was not able to perform the exercises and was told that she is out of shape.       Current medicines (including changes today)  Current Outpatient Prescriptions   Medication Sig Dispense Refill   • promethazine-codeine (PHENERGAN-CODEINE) 6.25-10 MG/5ML Syrup Take 5 mL by mouth at bedtime as needed for up to 30 days. 160 mL 0   • norethindrone-ethinyl estradiol (CYCLAFEM 7/7/7) 0.5/0.75/1-35 MG-MCG per tablet Take 1 Tab by mouth every day. 84 Tab 3     No current facility-administered medications for this visit.      She  has a past medical history of Head ache.    Social History and Family History were reviewed and updated.    ROS   No chest pain, no shortness of breath, no abdominal pain and all other systems were reviewed and are negative.       Objective:     Blood pressure (!) 92/62, pulse 68, temperature 36.9 °C (98.5 °F), height 1.626 m (5' 4\"), weight 65.2 kg (143 lb 11.8 oz), SpO2 96 %. Body mass index is 24.67 kg/m².   Physical Exam:  Constitutional: Alert, no distress.  Skin: Warm, dry, good turgor, no rashes in visible areas.  Eye: Equal, " round and reactive, conjunctiva clear, lids normal.  ENMT: Lips without lesions, good dentition, oropharynx clear.  Neck: Trachea midline, no masses.   Lymph: No cervical or supraclavicular lymphadenopathy  Respiratory: Unlabored respiratory effort, lungs clear to auscultation, no wheezes, no ronchi.  Cardiovascular: Normal S1, S2, no murmur, no edema.  Abdomen: Soft, non-tender, no masses.  Psych: Alert and oriented x3, normal affect and mood.        Assessment and Plan:   The following treatment plan was discussed    1. Encounter for initial prescription of contraceptive pills  Renewed birth control as directed. Provided a 90 day supply.  - norethindrone-ethinyl estradiol (CYCLAFEM 7/7/7) 0.5/0.75/1-35 MG-MCG per tablet; Take 1 Tab by mouth every day.  Dispense: 84 Tab; Refill: 3    2. Cough  Acute, new onset condition. Physical exam unremarkable. Given a codeine-based cough syrup taking nighttime only. Do not drink and drive. Contact me with any worsening symptoms. Suggested a pulmonary function test given the exacerbation of cough over the past 9 months.  - promethazine-codeine (PHENERGAN-CODEINE) 6.25-10 MG/5ML Syrup; Take 5 mL by mouth at bedtime as needed for up to 30 days.  Dispense: 160 mL; Refill: 0    3. Acute bilateral low back pain without sciatica  Chronic condition. Urged to perform exercises routinely.      Patient was seen for 25 minutes face to face of which > 50% of appointment time was spent on counseling and coordination of care regarding the above.      Followup: Return if symptoms worsen or fail to improve.    Please note that this dictation was created using voice recognition software. I have made every reasonable attempt to correct obvious errors, but I expect that there are errors of grammar and possibly content that I did not discover before finalizing the note.

## 2018-08-22 NOTE — ASSESSMENT & PLAN NOTE
This is a 28-year-old female was here today to reestablish care with me. She also is requesting a refill of her birth control. She is leaving for [place] needs a 3 month supply. She is doing well on the medication. No side effects.

## 2018-08-30 ENCOUNTER — PATIENT MESSAGE (OUTPATIENT)
Dept: MEDICAL GROUP | Facility: MEDICAL CENTER | Age: 28
End: 2018-08-30

## 2018-08-31 RX ORDER — METRONIDAZOLE 500 MG/1
500 TABLET ORAL 2 TIMES DAILY
Qty: 14 TAB | Refills: 0 | Status: SHIPPED | OUTPATIENT
Start: 2018-08-31 | End: 2018-09-07

## 2018-10-12 DIAGNOSIS — Z00.00 PREVENTATIVE HEALTH CARE: ICD-10-CM

## 2018-10-15 DIAGNOSIS — Z83.3 FAMILY HISTORY OF DIABETES MELLITUS TYPE II: ICD-10-CM

## 2018-10-25 ENCOUNTER — HOSPITAL ENCOUNTER (OUTPATIENT)
Dept: LAB | Facility: MEDICAL CENTER | Age: 28
End: 2018-10-25
Attending: PHYSICIAN ASSISTANT
Payer: COMMERCIAL

## 2018-10-25 DIAGNOSIS — Z83.3 FAMILY HISTORY OF DIABETES MELLITUS TYPE II: ICD-10-CM

## 2018-10-25 DIAGNOSIS — Z00.00 PREVENTATIVE HEALTH CARE: ICD-10-CM

## 2018-10-25 LAB
25(OH)D3 SERPL-MCNC: 16 NG/ML (ref 30–100)
ALBUMIN SERPL BCP-MCNC: 4.2 G/DL (ref 3.2–4.9)
ALBUMIN/GLOB SERPL: 1.2 G/DL
ALP SERPL-CCNC: 37 U/L (ref 30–99)
ALT SERPL-CCNC: 18 U/L (ref 2–50)
ANION GAP SERPL CALC-SCNC: 8 MMOL/L (ref 0–11.9)
AST SERPL-CCNC: 16 U/L (ref 12–45)
BASOPHILS # BLD AUTO: 0.6 % (ref 0–1.8)
BASOPHILS # BLD: 0.04 K/UL (ref 0–0.12)
BILIRUB SERPL-MCNC: 0.6 MG/DL (ref 0.1–1.5)
BUN SERPL-MCNC: 16 MG/DL (ref 8–22)
CALCIUM SERPL-MCNC: 9.6 MG/DL (ref 8.5–10.5)
CHLORIDE SERPL-SCNC: 105 MMOL/L (ref 96–112)
CHOLEST SERPL-MCNC: 135 MG/DL (ref 100–199)
CO2 SERPL-SCNC: 24 MMOL/L (ref 20–33)
CREAT SERPL-MCNC: 0.87 MG/DL (ref 0.5–1.4)
EOSINOPHIL # BLD AUTO: 0.27 K/UL (ref 0–0.51)
EOSINOPHIL NFR BLD: 4.1 % (ref 0–6.9)
ERYTHROCYTE [DISTWIDTH] IN BLOOD BY AUTOMATED COUNT: 37.2 FL (ref 35.9–50)
EST. AVERAGE GLUCOSE BLD GHB EST-MCNC: 120 MG/DL
FASTING STATUS PATIENT QL REPORTED: NORMAL
GLOBULIN SER CALC-MCNC: 3.5 G/DL (ref 1.9–3.5)
GLUCOSE SERPL-MCNC: 106 MG/DL (ref 65–99)
HBA1C MFR BLD: 5.8 % (ref 0–5.6)
HCT VFR BLD AUTO: 42.2 % (ref 37–47)
HDLC SERPL-MCNC: 56 MG/DL
HGB BLD-MCNC: 14.8 G/DL (ref 12–16)
IMM GRANULOCYTES # BLD AUTO: 0.02 K/UL (ref 0–0.11)
IMM GRANULOCYTES NFR BLD AUTO: 0.3 % (ref 0–0.9)
LDLC SERPL CALC-MCNC: 48 MG/DL
LYMPHOCYTES # BLD AUTO: 2.14 K/UL (ref 1–4.8)
LYMPHOCYTES NFR BLD: 32.9 % (ref 22–41)
MCH RBC QN AUTO: 29.9 PG (ref 27–33)
MCHC RBC AUTO-ENTMCNC: 35.1 G/DL (ref 33.6–35)
MCV RBC AUTO: 85.3 FL (ref 81.4–97.8)
MONOCYTES # BLD AUTO: 0.39 K/UL (ref 0–0.85)
MONOCYTES NFR BLD AUTO: 6 % (ref 0–13.4)
NEUTROPHILS # BLD AUTO: 3.65 K/UL (ref 2–7.15)
NEUTROPHILS NFR BLD: 56.1 % (ref 44–72)
NRBC # BLD AUTO: 0 K/UL
NRBC BLD-RTO: 0 /100 WBC
PLATELET # BLD AUTO: 302 K/UL (ref 164–446)
PMV BLD AUTO: 9.4 FL (ref 9–12.9)
POTASSIUM SERPL-SCNC: 3.4 MMOL/L (ref 3.6–5.5)
PROT SERPL-MCNC: 7.7 G/DL (ref 6–8.2)
RBC # BLD AUTO: 4.95 M/UL (ref 4.2–5.4)
SODIUM SERPL-SCNC: 137 MMOL/L (ref 135–145)
TRIGL SERPL-MCNC: 153 MG/DL (ref 0–149)
TSH SERPL DL<=0.005 MIU/L-ACNC: 1.62 UIU/ML (ref 0.38–5.33)
WBC # BLD AUTO: 6.5 K/UL (ref 4.8–10.8)

## 2018-10-25 PROCEDURE — 80061 LIPID PANEL: CPT

## 2018-10-25 PROCEDURE — 83036 HEMOGLOBIN GLYCOSYLATED A1C: CPT

## 2018-10-25 PROCEDURE — 84443 ASSAY THYROID STIM HORMONE: CPT

## 2018-10-25 PROCEDURE — 36415 COLL VENOUS BLD VENIPUNCTURE: CPT

## 2018-10-25 PROCEDURE — 85025 COMPLETE CBC W/AUTO DIFF WBC: CPT

## 2018-10-25 PROCEDURE — 82306 VITAMIN D 25 HYDROXY: CPT

## 2018-10-25 PROCEDURE — 80053 COMPREHEN METABOLIC PANEL: CPT

## 2018-10-27 PROBLEM — R73.03 PREDIABETES: Status: ACTIVE | Noted: 2018-10-27

## 2018-10-27 PROBLEM — R79.89 LOW VITAMIN D LEVEL: Status: ACTIVE | Noted: 2018-10-27

## 2018-11-01 ENCOUNTER — TELEPHONE (OUTPATIENT)
Dept: MEDICAL GROUP | Facility: MEDICAL CENTER | Age: 28
End: 2018-11-01

## 2018-11-01 NOTE — TELEPHONE ENCOUNTER
ESTABLISHED PATIENT PRE-VISIT PLANNING     Note: Patient will not be contacted if there is no indication to call.     1.  Reviewed notes from the last few office visits within the medical group: Yes 8/21/18    2.  If any orders were placed at last visit or intended to be done for this visit (i.e. 6 mos follow-up), do we have Results/Consult Notes?        •  Labs - Labs ordered, completed on 10/25/2018 and results are in chart.   Note: If patient appointment is for lab review and patient did not complete labs, check with provider if OK to reschedule patient until labs completed.       •  Imaging - Imaging was not ordered at last office visit.       •  Referrals - No referrals were ordered at last office visit.    3. Is this appointment scheduled as a Hospital Follow-Up? No    4.  Immunizations were updated in Epic using WebIZ?: Yes       •  Web Iz Recommendations: Patient is up to date on all vaccines    5.  Patient is due for the following Health Maintenance Topics:   There are no preventive care reminders to display for this patient.    - Patient is up-to-date on all Health Maintenance topics. No records have been requested at this time.    6.  MDX printed for Provider? NO    7.  Patient was NOT informed to arrive 15 min prior to their scheduled appointment and bring in their medication bottles.

## 2018-11-08 ENCOUNTER — HOSPITAL ENCOUNTER (OUTPATIENT)
Facility: MEDICAL CENTER | Age: 28
End: 2018-11-08
Attending: PHYSICIAN ASSISTANT
Payer: COMMERCIAL

## 2018-11-08 ENCOUNTER — OFFICE VISIT (OUTPATIENT)
Dept: MEDICAL GROUP | Facility: MEDICAL CENTER | Age: 28
End: 2018-11-08
Payer: COMMERCIAL

## 2018-11-08 VITALS
SYSTOLIC BLOOD PRESSURE: 124 MMHG | DIASTOLIC BLOOD PRESSURE: 62 MMHG | TEMPERATURE: 98.7 F | HEART RATE: 72 BPM | OXYGEN SATURATION: 96 % | BODY MASS INDEX: 24.04 KG/M2 | HEIGHT: 64 IN | WEIGHT: 140.8 LBS

## 2018-11-08 DIAGNOSIS — E87.6 HYPOKALEMIA: ICD-10-CM

## 2018-11-08 DIAGNOSIS — R73.03 PREDIABETES: ICD-10-CM

## 2018-11-08 DIAGNOSIS — R79.89 LOW VITAMIN D LEVEL: ICD-10-CM

## 2018-11-08 DIAGNOSIS — N89.8 VAGINAL DISCHARGE: ICD-10-CM

## 2018-11-08 DIAGNOSIS — Z83.3 FAMILY HISTORY OF DIABETES MELLITUS TYPE II: ICD-10-CM

## 2018-11-08 PROBLEM — R05.9 COUGH: Status: RESOLVED | Noted: 2018-08-21 | Resolved: 2018-11-08

## 2018-11-08 PROBLEM — M54.50 ACUTE BILATERAL LOW BACK PAIN WITHOUT SCIATICA: Status: RESOLVED | Noted: 2018-03-30 | Resolved: 2018-11-08

## 2018-11-08 PROCEDURE — 87660 TRICHOMONAS VAGIN DIR PROBE: CPT

## 2018-11-08 PROCEDURE — 87480 CANDIDA DNA DIR PROBE: CPT

## 2018-11-08 PROCEDURE — 87510 GARDNER VAG DNA DIR PROBE: CPT

## 2018-11-08 PROCEDURE — 99214 OFFICE O/P EST MOD 30 MIN: CPT | Performed by: PHYSICIAN ASSISTANT

## 2018-11-08 NOTE — ASSESSMENT & PLAN NOTE
Unfortunately labs recently returned with a A1c of 5.8.  Father diagnosed with diabetes in his 30s.  Grandmother  from diabetes on her maternal side in her 40s.  She eats rice daily.  Exercises routinely.

## 2018-11-08 NOTE — ASSESSMENT & PLAN NOTE
This is a 28-year-old female who is here today to discuss labs as well as a chronic history of BV.  She has been treated multiple times with Flagyl.  Continues with a discharge which usually comes out in a huge amount.  She denies any vaginal pain.  Denies any dysuria.  He also was diagnosed with chlamydia last year.  Her partner was treated.  She is here today requesting a Pap smear.

## 2018-11-08 NOTE — PROGRESS NOTES
"Subjective:   Shara Horner is a 28 y.o. female here today for vaginal discharge, prediabetes, low vitamin D level and hypo-kalemia.    Vaginal discharge  This is a 28-year-old female who is here today to discuss labs as well as a chronic history of BV.  She has been treated multiple times with Flagyl.  Continues with a discharge which usually comes out in a huge amount.  She denies any vaginal pain.  Denies any dysuria.  He also was diagnosed with chlamydia last year.  Her partner was treated.  She is here today requesting a Pap smear.    Prediabetes  Unfortunately labs recently returned with a A1c of 5.8.  Father diagnosed with diabetes in his 30s.  Grandmother  from diabetes on her maternal side in her 40s.  She eats rice daily.  Exercises routinely.    Low vitamin D level  Vitamin D level recently was at 16.  Does not take a supplement.    Hypokalemia  Potassium level recently turned at 3.4.  Denies any chest pain.  No shortness of breath.       Current medicines (including changes today)  Current Outpatient Prescriptions   Medication Sig Dispense Refill   • norethindrone-ethinyl estradiol (CYCLAFEM ) 0.5/0.75/1-35 MG-MCG per tablet Take 1 Tab by mouth every day. 84 Tab 3     No current facility-administered medications for this visit.      She  has a past medical history of Head ache.    Social History and Family History were reviewed and updated.    ROS   No chest pain, no shortness of breath, no abdominal pain and all other systems were reviewed and are negative.       Objective:     Blood pressure 124/62, pulse 72, temperature 37.1 °C (98.7 °F), height 1.626 m (5' 4\"), weight 63.9 kg (140 lb 12.8 oz), SpO2 96 %, not currently breastfeeding. Body mass index is 24.17 kg/m².   Physical Exam:  Constitutional: Alert, no distress.  Skin: Warm, dry, good turgor, no rashes in visible areas.  Eye: Equal, round and reactive, conjunctiva clear, lids normal.  ENMT: Lips without lesions, good dentition, " oropharynx clear.  Neck: Trachea midline, no masses.   Lymph: No cervical or supraclavicular lymphadenopathy  Respiratory: Unlabored respiratory effort, lungs appear clear to auscultation, no wheezes.  Cardiovascular: Regular rate and rhythm.  Psych: Alert and oriented x3, normal affect and mood.        Assessment and Plan:   The following treatment plan was discussed    1. Vaginal discharge  Acute, new onset condition.  Self swab performed to check culture.  She will be contacted with the results.  If positive for BV she will be placed on clindamycin.  She states that she will follow-up with her gynecologist for the Pap smear.  - VAGINAL PATHOGENS DNA PANEL; Future    2. Prediabetes  New condition but chronic.  Stop white rice multiple times daily.  Referred to diabetic education.  A1c ordered for 3 months.  - HEMOGLOBIN A1C; Future  - REFERRAL TO DIABETIC EDUCATION Diabetes Self Management Education / Training (DSME/T) and Medical Nutrition Therapy (MNT): Initial Group DSME/MNT as authorized by payor, Follow-Up DSME/MNT as authorized by payor; DSME/T Content: Monitoring Diabete...    3. Family history of diabetes mellitus type II  Referred to diabetic education.  - REFERRAL TO DIABETIC EDUCATION Diabetes Self Management Education / Training (DSME/T) and Medical Nutrition Therapy (MNT): Initial Group DSME/MNT as authorized by payor, Follow-Up DSME/MNT as authorized by payor; DSME/T Content: Monitoring Diabete...    4. Low vitamin D level  New condition.  Start vitamin D supplement 5000 units daily.  Vitamin D level ordered for 3 months.  - VITAMIN D,25 HYDROXY; Future    5. Hypokalemia  New condition.  Repeat basic metabolic panel in 3 months.  - BASIC METABOLIC PANEL; Future      Followup: Return in about 3 months (around 2/8/2019), or if symptoms worsen or fail to improve, for F/u Pap in one week..    Please note that this dictation was created using voice recognition software. I have made every reasonable  attempt to correct obvious errors, but I expect that there are errors of grammar and possibly content that I did not discover before finalizing the note.

## 2018-11-09 LAB
CANDIDA DNA VAG QL PROBE+SIG AMP: NEGATIVE
G VAGINALIS DNA VAG QL PROBE+SIG AMP: POSITIVE
T VAGINALIS DNA VAG QL PROBE+SIG AMP: NEGATIVE

## 2018-11-11 DIAGNOSIS — B96.89 BV (BACTERIAL VAGINOSIS): ICD-10-CM

## 2018-11-11 DIAGNOSIS — N76.0 BV (BACTERIAL VAGINOSIS): ICD-10-CM

## 2018-11-11 RX ORDER — CLINDAMYCIN HYDROCHLORIDE 300 MG/1
300 CAPSULE ORAL 2 TIMES DAILY
Qty: 14 CAP | Refills: 0 | Status: SHIPPED | OUTPATIENT
Start: 2018-11-11 | End: 2018-11-18

## 2018-11-12 ENCOUNTER — TELEPHONE (OUTPATIENT)
Dept: MEDICAL GROUP | Facility: MEDICAL CENTER | Age: 28
End: 2018-11-12

## 2018-11-12 NOTE — TELEPHONE ENCOUNTER
Phone Number Called: .[phone] (home)     Message: LM for patient to call and go over results    Left Message for patient to call back: yes

## 2018-11-12 NOTE — TELEPHONE ENCOUNTER
----- Message from Alvin Mcfarlane P.A.-C. sent at 11/11/2018 12:29 PM PST -----  Please contact Prem ADAMSON.  Sent over clindamycin for 7 days.  Read side effect profile.  F/u with GYN if develop more symptoms.  Thank you.    Alvin

## 2018-11-15 NOTE — TELEPHONE ENCOUNTER
Phone Number Called: [phone] (home)     Message: Patient informed of provider notes    Left Message for patient to call back: yes

## 2018-12-07 ENCOUNTER — HOSPITAL ENCOUNTER (OUTPATIENT)
Dept: LAB | Facility: MEDICAL CENTER | Age: 28
End: 2018-12-07
Attending: SPECIALIST
Payer: COMMERCIAL

## 2018-12-07 LAB — AMBIGUOUS DTTM AMBI4: NORMAL

## 2018-12-07 PROCEDURE — 87624 HPV HI-RISK TYP POOLED RSLT: CPT

## 2018-12-07 PROCEDURE — 87591 N.GONORRHOEAE DNA AMP PROB: CPT

## 2018-12-07 PROCEDURE — 88175 CYTOPATH C/V AUTO FLUID REDO: CPT

## 2018-12-07 PROCEDURE — 87491 CHLMYD TRACH DNA AMP PROBE: CPT

## 2018-12-11 LAB
C TRACH DNA GENITAL QL NAA+PROBE: NEGATIVE
CYTOLOGY REG CYTOL: NORMAL
HPV HR 12 DNA CVX QL NAA+PROBE: NEGATIVE
HPV16 DNA SPEC QL NAA+PROBE: NEGATIVE
HPV18 DNA SPEC QL NAA+PROBE: NEGATIVE
N GONORRHOEA DNA GENITAL QL NAA+PROBE: NEGATIVE
SPECIMEN SOURCE: NORMAL
SPECIMEN SOURCE: NORMAL

## 2019-02-11 DIAGNOSIS — Z00.00 PREVENTATIVE HEALTH CARE: ICD-10-CM

## 2019-02-20 ENCOUNTER — HOSPITAL ENCOUNTER (OUTPATIENT)
Dept: LAB | Facility: MEDICAL CENTER | Age: 29
End: 2019-02-20
Attending: PHYSICIAN ASSISTANT
Payer: COMMERCIAL

## 2019-02-20 DIAGNOSIS — R79.89 LOW VITAMIN D LEVEL: ICD-10-CM

## 2019-02-20 DIAGNOSIS — R73.03 PREDIABETES: ICD-10-CM

## 2019-02-20 DIAGNOSIS — E87.6 HYPOKALEMIA: ICD-10-CM

## 2019-02-20 LAB
25(OH)D3 SERPL-MCNC: 38 NG/ML (ref 30–100)
ANION GAP SERPL CALC-SCNC: 9 MMOL/L (ref 0–11.9)
BUN SERPL-MCNC: 18 MG/DL (ref 8–22)
CALCIUM SERPL-MCNC: 9 MG/DL (ref 8.5–10.5)
CHLORIDE SERPL-SCNC: 107 MMOL/L (ref 96–112)
CO2 SERPL-SCNC: 22 MMOL/L (ref 20–33)
CREAT SERPL-MCNC: 0.81 MG/DL (ref 0.5–1.4)
EST. AVERAGE GLUCOSE BLD GHB EST-MCNC: 108 MG/DL
FASTING STATUS PATIENT QL REPORTED: NORMAL
GLUCOSE SERPL-MCNC: 92 MG/DL (ref 65–99)
HBA1C MFR BLD: 5.4 % (ref 0–5.6)
POTASSIUM SERPL-SCNC: 3.7 MMOL/L (ref 3.6–5.5)
SODIUM SERPL-SCNC: 138 MMOL/L (ref 135–145)

## 2019-02-20 PROCEDURE — 83036 HEMOGLOBIN GLYCOSYLATED A1C: CPT

## 2019-02-20 PROCEDURE — 80048 BASIC METABOLIC PNL TOTAL CA: CPT

## 2019-02-20 PROCEDURE — 82306 VITAMIN D 25 HYDROXY: CPT

## 2019-02-20 PROCEDURE — 36415 COLL VENOUS BLD VENIPUNCTURE: CPT

## 2019-02-25 ENCOUNTER — APPOINTMENT (OUTPATIENT)
Dept: MEDICAL GROUP | Facility: MEDICAL CENTER | Age: 29
End: 2019-02-25
Payer: COMMERCIAL

## 2019-03-18 PROBLEM — R73.03 PREDIABETES: Status: RESOLVED | Noted: 2018-10-27 | Resolved: 2019-03-18

## 2019-04-25 ENCOUNTER — OFFICE VISIT (OUTPATIENT)
Dept: MEDICAL GROUP | Facility: MEDICAL CENTER | Age: 29
End: 2019-04-25
Payer: COMMERCIAL

## 2019-04-25 VITALS
RESPIRATION RATE: 18 BRPM | SYSTOLIC BLOOD PRESSURE: 126 MMHG | BODY MASS INDEX: 24.07 KG/M2 | OXYGEN SATURATION: 95 % | HEART RATE: 96 BPM | HEIGHT: 64 IN | TEMPERATURE: 98.4 F | DIASTOLIC BLOOD PRESSURE: 72 MMHG | WEIGHT: 141 LBS

## 2019-04-25 DIAGNOSIS — Z30.011 ENCOUNTER FOR INITIAL PRESCRIPTION OF CONTRACEPTIVE PILLS: ICD-10-CM

## 2019-04-25 DIAGNOSIS — Z30.41 ENCOUNTER FOR SURVEILLANCE OF CONTRACEPTIVE PILLS: ICD-10-CM

## 2019-04-25 DIAGNOSIS — Z83.3 FAMILY HISTORY OF DIABETES MELLITUS TYPE II: ICD-10-CM

## 2019-04-25 DIAGNOSIS — J06.9 ACUTE URI: ICD-10-CM

## 2019-04-25 PROBLEM — N89.8 VAGINAL DISCHARGE: Status: RESOLVED | Noted: 2018-05-11 | Resolved: 2019-04-25

## 2019-04-25 PROBLEM — B96.89 BV (BACTERIAL VAGINOSIS): Status: RESOLVED | Noted: 2018-11-11 | Resolved: 2019-04-25

## 2019-04-25 PROBLEM — G44.52 NEW DAILY PERSISTENT HEADACHE: Status: RESOLVED | Noted: 2018-01-12 | Resolved: 2019-04-25

## 2019-04-25 PROBLEM — N76.0 BV (BACTERIAL VAGINOSIS): Status: RESOLVED | Noted: 2018-11-11 | Resolved: 2019-04-25

## 2019-04-25 PROBLEM — E87.6 HYPOKALEMIA: Status: RESOLVED | Noted: 2018-11-08 | Resolved: 2019-04-25

## 2019-04-25 PROCEDURE — 99214 OFFICE O/P EST MOD 30 MIN: CPT | Performed by: PHYSICIAN ASSISTANT

## 2019-04-25 RX ORDER — PROMETHAZINE HYDROCHLORIDE AND CODEINE PHOSPHATE 6.25; 1 MG/5ML; MG/5ML
5 SYRUP ORAL NIGHTLY PRN
Qty: 160 ML | Refills: 0 | Status: SHIPPED | OUTPATIENT
Start: 2019-04-25 | End: 2019-05-10

## 2019-04-25 ASSESSMENT — PATIENT HEALTH QUESTIONNAIRE - PHQ9: CLINICAL INTERPRETATION OF PHQ2 SCORE: 0

## 2019-04-25 NOTE — PROGRESS NOTES
"Subjective:   Shara Patricia is a 29 y.o. female here today for surveillance of contraceptive pills, family history of diabetes and acute upper respiratory infection.    Encounter for surveillance of contraceptive pills  This is a 29-year-old female who is here today to discuss a cold but first she is running low on her birth control medication.  Requesting a refill.  Been taking the medication for a couple years.  No complaints.    Family history of diabetes mellitus type II  Her last A1c was at 5.4.  Prior to that about 6 months ago it was at 5.8.  She is been eating healthier.  Keeping her weight down.    Acute URI  Complains of a 2-week history of initially having a sore throat.  Now has sinus congestion drainage and coughing.  Denies any fevers today.  No shortness of breath or chest pain.  Is taking over-the-counter cold medications such as DayQuil and NyQuil.  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       Current medicines (including changes today)  Current Outpatient Prescriptions   Medication Sig Dispense Refill   • norethindrone-ethinyl estradiol (CYCLAFEM 7/7/7) 0.5/0.75/1-35 MG-MCG per tablet Take 1 Tab by mouth every day. 84 Tab 3   • promethazine-codeine (PHENERGAN-CODEINE) 6.25-10 MG/5ML Syrup Take 5 mL by mouth at bedtime as needed for up to 30 days. 160 mL 0     No current facility-administered medications for this visit.      She  has a past medical history of Head ache.    Social History and Family History were reviewed and updated.    ROS   No chest pain, no shortness of breath, no abdominal pain and all other systems were reviewed and are negative.       Objective:     /72 (BP Location: Right arm, Patient Position: Sitting, BP Cuff Size: Adult)   Pulse 96   Temp 36.9 °C (98.4 °F) (Temporal)   Resp 18   Ht 1.626 m (5' 4\")   Wt 64 kg (141 lb)   SpO2 95%  Body mass index is 24.2 kg/m². "   Physical Exam:  Constitutional: Alert, no distress.  Skin: Warm, dry, good turgor, no rashes in visible areas.  Eye: Equal, round and reactive, conjunctiva clear, lids normal.  ENMT: Lips without lesions, good dentition, oropharynx clear.  Neck: Trachea midline, no masses.   Lymph: No cervical or supraclavicular lymphadenopathy  Respiratory: Unlabored respiratory effort, lungs clear to auscultation, no wheezes, no ronchi.  Cardiovascular: Normal S1, S2, no murmur, no edema.  Abdomen: Soft, non-tender, no masses.  Psych: Alert and oriented x3, normal affect and mood.        Assessment and Plan:   The following treatment plan was discussed    1. Encounter for surveillance prescription of contraceptive pills  Chronic use.  Renewed medication.  - norethindrone-ethinyl estradiol (CYCLAFEM 7/7/7) 0.5/0.75/1-35 MG-MCG per tablet; Take 1 Tab by mouth every day.  Dispense: 84 Tab; Refill: 3    2. Acute URI  Acute, new onset condition.  Discussed viral process.  Push fluids.  Clear nose regularly.  Physical exam unremarkable except for a nagging cough.  Continue daytime cold medication.  Provided a cough syrup with codeine.  Do not drink or drive.   reviewed.  Medication appropriate.  Do not take with other cold medications.  Follow-up with any worsening symptoms such as fever, shortness of breath or chest pain.  - promethazine-codeine (PHENERGAN-CODEINE) 6.25-10 MG/5ML Syrup; Take 5 mL by mouth at bedtime as needed for up to 30 days.  Dispense: 160 mL; Refill: 0    3. Family history of diabetes mellitus type II  Recent A1c shows improved condition with non-prediabetic level.  Advised to repeat in 1 year.  Continue weight management and dietary changes.      Followup: Return if symptoms worsen or fail to improve.    Please note that this dictation was created using voice recognition software. I have made every reasonable attempt to correct obvious errors, but I expect that there are errors of grammar and possibly content  that I did not discover before finalizing the note.

## 2019-04-25 NOTE — ASSESSMENT & PLAN NOTE
Her last A1c was at 5.4.  Prior to that about 6 months ago it was at 5.8.  She is been eating healthier.  Keeping her weight down.

## 2019-04-25 NOTE — ASSESSMENT & PLAN NOTE
This is a 29-year-old female who is here today to discuss a cold but first she is running low on her birth control medication.  Requesting a refill.  Been taking the medication for a couple years.  No complaints.

## 2019-04-25 NOTE — ASSESSMENT & PLAN NOTE
Complains of a 2-week history of initially having a sore throat.  Now has sinus congestion drainage and coughing.  Denies any fevers today.  No shortness of breath or chest pain.  Is taking over-the-counter cold medications such as DayQuil and NyQuil.  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

## 2019-04-29 ENCOUNTER — OCCUPATIONAL MEDICINE (OUTPATIENT)
Dept: URGENT CARE | Facility: CLINIC | Age: 29
End: 2019-04-29
Payer: COMMERCIAL

## 2019-04-29 VITALS
SYSTOLIC BLOOD PRESSURE: 110 MMHG | WEIGHT: 140 LBS | OXYGEN SATURATION: 96 % | HEIGHT: 64 IN | BODY MASS INDEX: 23.9 KG/M2 | DIASTOLIC BLOOD PRESSURE: 74 MMHG | RESPIRATION RATE: 14 BRPM | TEMPERATURE: 98 F | HEART RATE: 84 BPM

## 2019-04-29 DIAGNOSIS — Z02.1 PRE-EMPLOYMENT DRUG SCREENING: ICD-10-CM

## 2019-04-29 DIAGNOSIS — S29.011A STRAIN OF RIGHT PECTORALIS MUSCLE, INITIAL ENCOUNTER: ICD-10-CM

## 2019-04-29 DIAGNOSIS — S46.911A RIGHT SHOULDER STRAIN, INITIAL ENCOUNTER: ICD-10-CM

## 2019-04-29 LAB
AMP AMPHETAMINE: NORMAL
BAR BARBITURATES: NORMAL
BREATH ALCOHOL COMMENT: NORMAL
BZO BENZODIAZEPINES: NORMAL
COC COCAINE: NORMAL
INT CON NEG: NORMAL
INT CON POS: NORMAL
MDMA ECSTASY: NORMAL
MET METHAMPHETAMINES: NORMAL
MTD METHADONE: NORMAL
OPI OPIATES: NORMAL
OXY OXYCODONE: NORMAL
PCP PHENCYCLIDINE: NORMAL
POC BREATHALIZER: 0 PERCENT (ref 0–0.01)
POC URINE DRUG SCREEN OCDRS: NORMAL
THC: NORMAL

## 2019-04-29 PROCEDURE — 80305 DRUG TEST PRSMV DIR OPT OBS: CPT | Performed by: PHYSICIAN ASSISTANT

## 2019-04-29 PROCEDURE — 99214 OFFICE O/P EST MOD 30 MIN: CPT | Mod: 29 | Performed by: PHYSICIAN ASSISTANT

## 2019-04-29 PROCEDURE — 82075 ASSAY OF BREATH ETHANOL: CPT | Performed by: PHYSICIAN ASSISTANT

## 2019-04-29 RX ORDER — NAPROXEN 500 MG/1
500 TABLET ORAL 2 TIMES DAILY WITH MEALS
Qty: 30 TAB | Refills: 0 | Status: SHIPPED | OUTPATIENT
Start: 2019-04-29 | End: 2019-05-10

## 2019-04-29 ASSESSMENT — ENCOUNTER SYMPTOMS
TINGLING: 0
FALLS: 0

## 2019-04-29 NOTE — LETTER
[facility]  [address], [address] [place] [address]  Phone:  [phone] - Fax:  [phone]   [facility] Progress Report and Disability Certification  Date of Service: 4/29/2019   No Show:  No  Date / Time of Next Visit: 5/3/2019   Claim Information   Patient Name: Shara Patricia  Claim Number:     Employer: [employer]  Date of Injury: 4/28/2019     Insurer / TPA: [employer]  ID / SSN:     Occupation:   Diagnosis: Diagnoses of Right shoulder strain, initial encounter and Strain of right pectoralis muscle, initial encounter were pertinent to this visit.    Medical Information   Related to Industrial Injury? Yes    Subjective Complaints:  OI: 4/28/19.  Pt complains of right shoulder pain x 1 day.  Pt was putting back filth bar. This became too heavy for her and she felt a pulling in her shoulder. Pain is aching and radiates down top of arm.  Pain is worse with lifting and when doing arm circles.  Pain is worsening. Denies distal numbness and tingling, hand weakness.  She is not taking any medications for symptoms or applying ice/heat.  LHD. Denies prior injury. 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   Objective Findings: Musculoskeletal:        Right shoulder: She exhibits decreased range of motion and tenderness. She exhibits no bony tenderness, no swelling, no effusion, no crepitus and no deformity.   Flexion to 90 degrees.  Extension to 30 degrees.  Abduction to 110 degrees.  Abduction to 50 degrees.  Negative empty can test.  Patient is diffusely tender over supraspinatus, deltoid, pectoralis minor and pectoralis major.   strength 5 out of 5.   Neurological: She is alert and oriented to person, place, and time. No sensory deficit.   Radial, median, ulnar nerves intact.   Skin: Skin is warm and dry. Capillary refill takes less than 2 seconds.    Pre-Existing  Condition(s):     Assessment:   Initial Visit    Status: Additional Care Required  Permanent Disability:No    Plan: Medication  Comments:naproxen    Diagnostics:      Comments:  NSAIDs, rest, heat, gentle stretching.  Return to clinic in 4 days for reevaluation.    Disability Information   Status: Released to Restricted Duty    From:  4/29/2019  Through: 5/3/2019 Restrictions are:     Physical Restrictions   Sitting:    Standing:    Stooping:    Bending:      Squatting:    Walking:    Climbing:    Pushing:      Pulling:    Other:    Reaching Above Shoulder (L):   Reaching Above Shoulder (R):       Reaching Below Shoulder (L):    Reaching Below Shoulder (R):      Not to exceed Weight Limits   Carrying(hrs):   Weight Limit(lb):   Lifting(hrs):   Weight  Limit(lb):     Comments: No use of right upper extremity at work except for activities of daily living.    Repetitive Actions   Hands: i.e. Fine Manipulations from Grasping:     Feet: i.e. Operating Foot Controls:     Driving / Operate Machinery:     Physician Name: Renea Blanco P.A.-C. Physician Signature: RENEA Mott P.A.-C. e-Signature: Dr. Chad Zarate, Medical Director   Clinic Name / Location: [address], [address]  [place] [address] Clinic Phone Number: Dept: [phone]   Appointment Time: 3:45 Pm Visit Start Time: 4:24 PM   Check-In Time:  3:43 Pm Visit Discharge Time:  4:52 PM   Original-Treating Physician or Chiropractor    Page 2-Insurer/TPA    Page 3-Employer    Page 4-Employee

## 2019-04-29 NOTE — PROGRESS NOTES
"Subjective:   Shara Patricia is a 29 y.o. female who presents for Shoulder Injury (WC New, Lift injury, R/ shoulder)        DOI: 4/28/19.  Pt complains of right shoulder pain x 1 day.  Pt was putting back filth bar. This became too heavy for her and she felt a pulling in her shoulder. Pain is aching and radiates down top of arm.  Pain is worse with lifting and when doing arm circles.  Pain is worsening. Denies distal numbness and tingling, hand weakness.  She is not taking any medications for symptoms or applying ice/heat.  LHD. Denies prior injury. 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          Review of Systems   Musculoskeletal: Positive for joint pain. Negative for falls.   Neurological: Negative for tingling.       PMH:  has a past medical history of Head ache.  MEDS:   Current Outpatient Prescriptions:   •  naproxen (NAPROSYN) 500 MG Tab, Take 1 Tab by mouth 2 times a day, with meals., Disp: 30 Tab, Rfl: 0  •  norethindrone-ethinyl estradiol (CYCLAFEM 7/7/7) 0.5/0.75/1-35 MG-MCG per tablet, Take 1 Tab by mouth every day., Disp: 84 Tab, Rfl: 3  •  promethazine-codeine (PHENERGAN-CODEINE) 6.25-10 MG/5ML Syrup, Take 5 mL by mouth at bedtime as needed for up to 30 days., Disp: 160 mL, Rfl: 0  ALLERGIES:   Allergies   Allergen Reactions   • Crab      SURGHX:   Past Surgical History:   Procedure Laterality Date   • PRIMARY C SECTION  3/13/2012    Performed by JOEL JONES at [facility]     SOCHX:  reports that she has never smoked. She has never used smokeless tobacco. She reports that she drinks alcohol. She reports that she does not use drugs.  FH: Family history was reviewed, no pertinent findings to report   Objective:   /74   Pulse 84   Temp 36.7 °C (98 °F) (Temporal)   Resp 14   Ht 1.626 m (5' 4\")   Wt 63.5 kg (140 lb)   SpO2 96%   BMI 24.03 kg/m²   Physical Exam   Constitutional: She is oriented to person, place, and time. She appears well-developed and " well-nourished.  Non-toxic appearance. No distress.   HENT:   Head: Normocephalic and atraumatic.   Right Ear: External ear normal.   Left Ear: External ear normal.   Nose: Nose normal.   Neck: Neck supple.   Pulmonary/Chest: Effort normal. No respiratory distress.   Musculoskeletal:        Right shoulder: She exhibits decreased range of motion and tenderness. She exhibits no bony tenderness, no swelling, no effusion, no crepitus and no deformity.   Flexion to 90 degrees.  Extension to 30 degrees.  Abduction to 110 degrees.  Abduction to 50 degrees.  Negative empty can test.  Patient is diffusely tender over supraspinatus, deltoid, pectoralis minor and pectoralis major.   strength 5 out of 5.   Neurological: She is alert and oriented to person, place, and time. No sensory deficit.   Radial, median, ulnar nerves intact.   Skin: Skin is warm and dry. Capillary refill takes less than 2 seconds.   Psychiatric: She has a normal mood and affect. Her speech is normal and behavior is normal. Judgment and thought content normal. Cognition and memory are normal.   Vitals reviewed.        Assessment/Plan:   1. Right shoulder strain, initial encounter  - naproxen (NAPROSYN) 500 MG Tab; Take 1 Tab by mouth 2 times a day, with meals.  Dispense: 30 Tab; Refill: 0    2. Strain of right pectoralis muscle, initial encounter  - naproxen (NAPROSYN) 500 MG Tab; Take 1 Tab by mouth 2 times a day, with meals.  Dispense: 30 Tab; Refill: 0     No red flag symptoms.  No bony tenderness.  Radiological imaging not indicated at this time.    Patient instructed to rest and avoid aggravating activities.  Apply warm, damp heat to the affected area and perform gentle stretches as instructed in clinic.  As symptoms improve patient may increase activity.    Start naproxen 2 times daily for the next 3-5 days.  Patient instructed to titrate medications down as symptoms improve.    Return to clinic in 4 days for reevaluation.    Differential  diagnosis, natural history, supportive care, and indications for immediate follow-up discussed.

## 2019-05-03 ENCOUNTER — OCCUPATIONAL MEDICINE (OUTPATIENT)
Dept: URGENT CARE | Facility: CLINIC | Age: 29
End: 2019-05-03
Payer: COMMERCIAL

## 2019-05-03 VITALS
SYSTOLIC BLOOD PRESSURE: 124 MMHG | DIASTOLIC BLOOD PRESSURE: 80 MMHG | BODY MASS INDEX: 23.9 KG/M2 | HEART RATE: 86 BPM | OXYGEN SATURATION: 97 % | HEIGHT: 64 IN | RESPIRATION RATE: 16 BRPM | WEIGHT: 140 LBS | TEMPERATURE: 97 F

## 2019-05-03 DIAGNOSIS — S29.011D STRAIN OF RIGHT PECTORALIS MUSCLE, SUBSEQUENT ENCOUNTER: ICD-10-CM

## 2019-05-03 PROCEDURE — 99213 OFFICE O/P EST LOW 20 MIN: CPT | Performed by: EMERGENCY MEDICINE

## 2019-05-03 ASSESSMENT — ENCOUNTER SYMPTOMS
ABDOMINAL PAIN: 0
SHORTNESS OF BREATH: 0
NECK PAIN: 0
COUGH: 0

## 2019-05-03 NOTE — PROGRESS NOTES
"Subjective:      Shara Patricia is a 29 y.o. female who presents with Arm Injury (WC Fv, Pt states she has improved slightly)      Date of injury: 04/28/2019.  Injured at work: yes; onset of right shoulder discomfort associated with lifting activity at shoulder right. Previous injury: none. Second job: none. Outside activity: none. Contributing medical illness: none. Severity: mild, improving. Prior treatment: Naproxen. Location: Right anterior medial shoulder/pectoral region is. Radiation: none. Numbness/tingling: none. Focal weakness: none.     HPI    Review of Systems   Respiratory: Negative for cough and shortness of breath.    Cardiovascular: Negative for chest pain.   Gastrointestinal: Negative for abdominal pain.   Musculoskeletal: Negative for neck pain.     PMH:  has a past medical history of Head ache.  MEDS:   Current Outpatient Prescriptions:   •  naproxen (NAPROSYN) 500 MG Tab, Take 1 Tab by mouth 2 times a day, with meals., Disp: 30 Tab, Rfl: 0  •  norethindrone-ethinyl estradiol (CYCLAFEM 7/7/7) 0.5/0.75/1-35 MG-MCG per tablet, Take 1 Tab by mouth every day., Disp: 84 Tab, Rfl: 3  •  promethazine-codeine (PHENERGAN-CODEINE) 6.25-10 MG/5ML Syrup, Take 5 mL by mouth at bedtime as needed for up to 30 days., Disp: 160 mL, Rfl: 0  ALLERGIES:   Allergies   Allergen Reactions   • Crab      SURGHX:   Past Surgical History:   Procedure Laterality Date   • PRIMARY C SECTION  3/13/2012    Performed by JOEL JONES at [facility]     SOCHX:  reports that she has never smoked. She has never used smokeless tobacco. She reports that she drinks alcohol. She reports that she does not use drugs.  FH: family history includes Diabetes in her father; Diabetes (age of onset: 50) in her maternal grandmother; Hypertension in her sister.       Objective:     /80   Pulse 86   Temp 36.1 °C (97 °F) (Temporal)   Resp 16   Ht 1.626 m (5' 4\")   Wt 63.5 kg (140 lb)   SpO2 97%   BMI 24.03 kg/m²      Physical " Exam    General: Alert, cooperative, no acute distress.  Neck: Nontender, normal range of motion.  Musculoskeletal- torso: Moderate tenderness right medial pectoral, costochondral region.  Normal work of breathing.  Right shoulder: Mild tenderness right AC region.  Normal range of motion; no rotator cuff function deficit.  Vascular: Radial pulses intact bilaterally.  Neurological: Bilateral upper extremity strength symmetrically intact, light touch sensation intact.  Skin: Warm, dry, intact.       Assessment/Plan:     1. Strain of right pectoralis muscle, subsequent encounter  D39 completed.  Advised ice/heat, OTC analgesia PRN.  Noted improvement; likely to advance to unrestricted activity in the next week

## 2019-05-03 NOTE — LETTER
[facility]  [address] [facility] [address], [address] [address]  Phone:  [phone] - Fax:  [phone]   [facility] Progress Report and Disability Certification  Date of Service: 5/3/2019   No Show:  No  Date / Time of Next Visit: 5/7/2019   Claim Information   Patient Name: Shara Patricia  Claim Number:     Employer: [employer] [facility] [employer]  Date of Injury: 4/28/2019     Insurer / TPA: [employer]  ID / SSN:     Occupation:   Diagnosis: The encounter diagnosis was Strain of right pectoralis muscle, subsequent encounter.    Medical Information   Related to Industrial Injury? Yes    Subjective Complaints:  Date of injury: 04/28/2019.  Injured at work: yes; onset of right shoulder discomfort associated with lifting activity at shoulder right. Previous injury: none. Second job: none. Outside activity: none. Contributing medical illness: none. Severity: mild, improving. Prior treatment: Naproxen. Location: Right anterior medial shoulder/pectoral region is. Radiation: none. Numbness/tingling: none. Focal weakness: none.   Objective Findings: General: Alert, cooperative, no acute distress.  Neck: Nontender, normal range of motion.  Musculoskeletal- torso: Moderate tenderness right medial pectoral, costochondral region.  Normal work of breathing.  Right shoulder: Mild tenderness right AC region.  Normal range of motion; no rotator cuff function deficit.  Vascular: Radial pulses intact bilaterally.  Neurological: Bilateral upper extremity strength symmetrically intact, light touch sensation intact.  Skin: Warm, dry, intact.   Pre-Existing Condition(s):     Assessment:   Condition Improved    Status: Additional Care Required  Permanent Disability:No    Plan: Medication    Diagnostics:      Comments:       Disability Information   Status: Released to Restricted Duty    From:  5/3/2019  Through: 5/7/2019 Restrictions are: Temporary    Physical Restrictions   Sitting:    Standing:    Stooping:    Bending:      Squatting:    Walking:    Climbing:    Pushing:    Comments:Right arm limited to 10 pounds   Pulling:    Comments:Right arm limited to 10 pounds Other:    Reaching Above Shoulder (L):   Reaching Above Shoulder (R): 0 hrs/day     Reaching Below Shoulder (L):    Reaching Below Shoulder (R):      Not to exceed Weight Limits   Carrying(hrs):   Weight Limit(lb): < or = to 25 pounds Lifting(hrs):   Weight  Limit(lb): < or = to 25 pounds   Comments:      Repetitive Actions   Hands: i.e. Fine Manipulations from Grasping:     Feet: i.e. Operating Foot Controls:     Driving / Operate Machinery:     Physician Name: Serjio Joseph M.D. Physician Signature: SERJIO White M.D. e-Signature: Dr. Chad Zarate, Medical Director   Clinic Name / Location: [address], [address]  [place] [address] Clinic Phone Number: Dept: [phone]   Appointment Time: 10:30 Am Visit Start Time: 10:26 AM   Check-In Time:  10:24 Am Visit Discharge Time:  10:49am   Original-Treating Physician or Chiropractor    Page 2-Insurer/TPA    Page 3-Employer    Page 4-Employee

## 2019-05-10 ENCOUNTER — OCCUPATIONAL MEDICINE (OUTPATIENT)
Dept: URGENT CARE | Facility: CLINIC | Age: 29
End: 2019-05-10
Payer: COMMERCIAL

## 2019-05-10 VITALS
RESPIRATION RATE: 16 BRPM | BODY MASS INDEX: 24.24 KG/M2 | HEIGHT: 64 IN | HEART RATE: 74 BPM | TEMPERATURE: 98.4 F | WEIGHT: 142 LBS | DIASTOLIC BLOOD PRESSURE: 80 MMHG | SYSTOLIC BLOOD PRESSURE: 122 MMHG | OXYGEN SATURATION: 96 %

## 2019-05-10 DIAGNOSIS — S46.911A STRAIN OF RIGHT SHOULDER, INITIAL ENCOUNTER: Primary | ICD-10-CM

## 2019-05-10 PROCEDURE — 99214 OFFICE O/P EST MOD 30 MIN: CPT | Mod: 29 | Performed by: PHYSICIAN ASSISTANT

## 2019-05-10 NOTE — LETTER
[facility] [address] - [place] [address]  Phone:  [phone] - Fax:  [phone]   [facility] Progress Report and Disability Certification  Date of Service: 5/10/2019   No Show:  No  Date / Time of Next Visit: 5/17/2019   Claim Information   Patient Name: Shara Patricia  Claim Number:     Employer: [employer]  Date of Injury: 4/28/2019     Insurer / TPA: [employer]  ID / SSN:     Occupation:   Diagnosis: The encounter diagnosis was Strain of right shoulder, initial encounter.    Medical Information   Related to Industrial Injury? Yes    Subjective Complaints:  Date of injury: 04/28/2019. Pt here for third WC visit and notes ready to try full duty. Injured at work: yes; onset of right shoulder discomfort associated with lifting activity at shoulder right. Previous injury: none. Second job: none. Outside activity: none. Contributing medical illness: none. Severity: mild, improving. Prior treatment: Naproxen. Location: Right anterior medial shoulder/pectoral region is. Radiation: none. Numbness/tingling: none. Focal weakness: none. Pt has not taken any Rx medications for this condition. Pt states the pain is a 0/10. Pt denies CP, SOB, NVD, paresthesias, headaches, dizziness, change in vision, hives, or other joint pain. The pt's medication list, problem list, and allergies have been evaluated and reviewed during today's visit.     Objective Findings: Right shoulder: No ecchymoses, no edema, no erythema, no signs of infection present, No TTP. +AROM, +SILT, STR 5/5.     Pre-Existing Condition(s):     Assessment:   Condition Improved    Status: Additional Care Required  Permanent Disability:No    Plan:      Diagnostics:      Comments:       Disability Information   Status: Released to Full Duty    From:  5/10/2019  Through: 5/17/2019 Restrictions are: Temporary   Physical  Restrictions   Sitting:    Standing:    Stooping:    Bending:      Squatting:    Walking:    Climbing:    Pushing:      Pulling:    Other:    Reaching Above Shoulder (L):   Reaching Above Shoulder (R):       Reaching Below Shoulder (L):    Reaching Below Shoulder (R):      Not to exceed Weight Limits   Carrying(hrs):   Weight Limit(lb):   Lifting(hrs):   Weight  Limit(lb):     Comments:      Repetitive Actions   Hands: i.e. Fine Manipulations from Grasping:     Feet: i.e. Operating Foot Controls:     Driving / Operate Machinery:     Physician Name: Forrest Ramos P.A.-C. Physician Signature: FORREST Saucedo P.A.-C. e-Signature: Dr. Chad Zarate, Medical Director   Clinic Name / Location: [address] [place] [address] [place] [address] Clinic Phone Number: Dept: [phone]   Appointment Time: 5:00 Pm Visit Start Time: 5:48 PM   Check-In Time:  5:05 Pm Visit Discharge Time:  5:58 PM   Original-Treating Physician or Chiropractor    Page 2-Insurer/TPA    Page 3-Employer    Page 4-Employee

## 2019-05-11 NOTE — PROGRESS NOTES
Subjective:      Pt Is is a 29 y.o. female who presents with Shoulder Injury (right, follow-up)      Date of injury: 2019. Pt here for third WC visit and notes ready to try full duty. Injured at work: yes; onset of right shoulder discomfort associated with lifting activity at shoulder right. Previous injury: none. Second job: none. Outside activity: none. Contributing medical illness: none. Severity: mild, improving. Prior treatment: Naproxen. Location: Right anterior medial shoulder/pectoral region is. Radiation: none. Numbness/tingling: none. Focal weakness: none. Pt has not taken any Rx medications for this condition. Pt states the pain is a 0/10. Pt denies CP, SOB, NVD, paresthesias, headaches, dizziness, change in vision, hives, or other joint pain. The pt's medication list, problem list, and allergies have been evaluated and reviewed during today's visit.       HPI  PMH:  Past Medical History:   Diagnosis Date   • Head ache        PSH:  Past Surgical History:   Procedure Laterality Date   • PRIMARY C SECTION  3/13/2012    Performed by JOEL JONES at [facility]       Fam Hx:    family history includes Diabetes in her father; Diabetes (age of onset: 50) in her maternal grandmother; Hypertension in her sister.  Family Status   Relation Status   • Mo Alive   • Fa Alive   • Sis Alive   • Bro Alive   • MGMo    • MGFa Alive   • PGMo    • PGFa    • Neg Hx (Not Specified)       Soc HX:  Social History     Social History   • Marital status: Single     Spouse name: N/A   • Number of children: N/A   • Years of education: N/A     Occupational History   • Not on file.     Social History Main Topics   • Smoking status: Never Smoker   • Smokeless tobacco: Never Used   • Alcohol use Yes      Comment: 3 drinks every few months   • Drug use: No   • Sexual activity: Yes     Partners: Male     Birth control/ protection: Pill     Other Topics Concern   • Not on file     Social History  "Narrative   • No narrative on file         Medications:    Current Outpatient Prescriptions:   •  norethindrone-ethinyl estradiol (CYCLAFEM 7/7/7) 0.5/0.75/1-35 MG-MCG per tablet, Take 1 Tab by mouth every day., Disp: 84 Tab, Rfl: 3      Allergies:  Crab    ROS  Constitutional: Negative for fever, chills and malaise/fatigue.   HENT: Negative for congestion and sore throat.    Eyes: Negative for blurred vision, double vision and photophobia.   Respiratory: Negative for cough and shortness of breath.  Cardiovascular: Negative for chest pain and palpitations.   Gastrointestinal: Negative for heartburn, nausea, vomiting, abdominal pain, diarrhea and constipation.   Genitourinary: Negative for dysuria and flank pain.   Musculoskeletal: POS for right shoulder joint pain and myalgias.   Skin: Negative for itching and rash.   Neurological: Negative for dizziness, tingling and headaches.   Endo/Heme/Allergies: Does not bruise/bleed easily.   Psychiatric/Behavioral: Negative for depression. The patient is not nervous/anxious.           Objective:     /80 (BP Location: Left arm, Patient Position: Sitting, BP Cuff Size: Adult)   Pulse 74   Temp 36.9 °C (98.4 °F) (Temporal)   Resp 16   Ht 1.626 m (5' 4\")   Wt 64.4 kg (142 lb)   SpO2 96%   BMI 24.37 kg/m²      Physical Exam    Right shoulder: No ecchymoses, no edema, no erythema, no signs of infection present, No TTP. +AROM, +SILT, STR 5/5.    Constitutional: PT is oriented to person, place, and time. PT appears well-developed and well-nourished. No distress.   HENT:   Head: Normocephalic and atraumatic.   Mouth/Throat: Oropharynx is clear and moist. No oropharyngeal exudate.   Eyes: Conjunctivae normal and EOM are normal. Pupils are equal, round, and reactive to light.   Neck: Normal range of motion. Neck supple. No thyromegaly present.   Cardiovascular: Normal rate, regular rhythm, normal heart sounds and intact distal pulses.  Exam reveals no gallop and no " friction rub.    No murmur heard.  Pulmonary/Chest: Effort normal and breath sounds normal. No respiratory distress. PT has no wheezes. PT has no rales. Pt exhibits no tenderness.   Abdominal: Soft. Bowel sounds are normal. PT exhibits no distension and no mass. There is no tenderness. There is no rebound and no guarding.   Neurological: PT is alert and oriented to person, place, and time. PT has normal reflexes. No cranial nerve deficit.   Skin: Skin is warm and dry. No rash noted. PT is not diaphoretic. No erythema.       Psychiatric: PT has a normal mood and affect. PT behavior is normal. Judgment and thought content normal.        Assessment/Plan:     1. Strain of right shoulder, initial encounter      Pt notes ready for full duty trial and likely D/C next visit  RICE therapy discussed  Gentle ROM exercises discussed  WBAT RUE  Ice/heat therapy discussed  OTC ibuprofen for pain control  Rest, fluids encouraged.  AVS with medical info given.  Pt was in full understanding and agreement with the plan.  Follow-up 7 days for probable D/C MMI

## 2019-05-18 ENCOUNTER — OCCUPATIONAL MEDICINE (OUTPATIENT)
Dept: URGENT CARE | Facility: CLINIC | Age: 29
End: 2019-05-18
Payer: COMMERCIAL

## 2019-05-18 VITALS
TEMPERATURE: 98.5 F | WEIGHT: 141 LBS | OXYGEN SATURATION: 96 % | RESPIRATION RATE: 20 BRPM | HEIGHT: 64 IN | BODY MASS INDEX: 24.07 KG/M2 | DIASTOLIC BLOOD PRESSURE: 84 MMHG | SYSTOLIC BLOOD PRESSURE: 124 MMHG | HEART RATE: 76 BPM

## 2019-05-18 DIAGNOSIS — S29.011D STRAIN OF RIGHT PECTORALIS MUSCLE, SUBSEQUENT ENCOUNTER: ICD-10-CM

## 2019-05-18 DIAGNOSIS — S46.911D STRAIN OF RIGHT SHOULDER, SUBSEQUENT ENCOUNTER: ICD-10-CM

## 2019-05-18 PROCEDURE — 99213 OFFICE O/P EST LOW 20 MIN: CPT | Mod: 29 | Performed by: PHYSICIAN ASSISTANT

## 2019-05-18 ASSESSMENT — ENCOUNTER SYMPTOMS
SENSORY CHANGE: 0
PALPITATIONS: 0
CHILLS: 0
TINGLING: 0
VOMITING: 0
NAUSEA: 0
SORE THROAT: 0
FEVER: 0
BLURRED VISION: 0
SHORTNESS OF BREATH: 0

## 2019-05-18 NOTE — PROGRESS NOTES
Subjective:      Shara Patricia is a 29 y.o. female who presents with Arm Injury (RT arm is feeling better, injury was 04/28/19)      DOI: 4/28/19    ORIGINAL HPI COPIED:  Pt complains of right shoulder pain x 1 day.  Pt was putting back filth bar. This became too heavy for her and she felt a pulling in her shoulder. Pain is aching and radiates down top of arm.  Pain is worse with lifting and when doing arm circles.  Pain is worsening. Denies distal numbness and tingling, hand weakness.  She is not taking any medications for symptoms or applying ice/heat.  LHD. Denies prior injury. 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      5/18/2019 Follow-up visit: Patient reports that she has been tolerating full duty at work with no complaints.  Says that she is 100% improved.  She is no longer taking any OTC medications for the pain.  No new injury.  Denies numbness/tingling/weakness.  She would like to be discharged from our care        Review of Systems   Constitutional: Negative for chills and fever.   HENT: Negative for sore throat.    Eyes: Negative for blurred vision.   Respiratory: Negative for shortness of breath.    Cardiovascular: Negative for chest pain and palpitations.   Gastrointestinal: Negative for nausea and vomiting.   Musculoskeletal: Negative for joint pain.   Neurological: Negative for tingling and sensory change.       PMH:  has a past medical history of Head ache.  MEDS:   Current Outpatient Prescriptions:   •  norethindrone-ethinyl estradiol (CYCLAFEM 7/7/7) 0.5/0.75/1-35 MG-MCG per tablet, Take 1 Tab by mouth every day., Disp: 84 Tab, Rfl: 3  ALLERGIES:   Allergies   Allergen Reactions   • Crab      SURGHX:   Past Surgical History:   Procedure Laterality Date   • PRIMARY C SECTION  3/13/2012    Performed by JOEL JONES at [facility]     SOCHX:  reports that she has never smoked. She has never used smokeless tobacco. She reports that she drinks alcohol. She reports  "that she does not use drugs.  FH: Family history was reviewed, no pertinent findings to report       Objective:     /84 (BP Location: Right arm, Patient Position: Sitting, BP Cuff Size: Adult)   Pulse 76   Temp 36.9 °C (98.5 °F) (Temporal)   Resp 20   Ht 1.626 m (5' 4\")   Wt 64 kg (141 lb)   SpO2 96%   BMI 24.20 kg/m²      Physical Exam   Constitutional: She is oriented to person, place, and time. She appears well-developed and well-nourished.   HENT:   Head: Normocephalic and atraumatic.   Right Ear: External ear normal.   Left Ear: External ear normal.   Eyes: Pupils are equal, round, and reactive to light. Conjunctivae are normal.   Cardiovascular: Normal rate, regular rhythm and normal heart sounds.    No murmur heard.  Pulmonary/Chest: Effort normal and breath sounds normal. She has no wheezes.   Musculoskeletal:        Right shoulder: She exhibits normal range of motion, no tenderness, no bony tenderness, no swelling, no effusion, no crepitus, no deformity, no pain and normal strength.   Neurological: She is alert and oriented to person, place, and time.   Skin: Skin is warm and dry. Capillary refill takes less than 2 seconds.   Psychiatric: She has a normal mood and affect. Her behavior is normal. Judgment normal.               Assessment/Plan:     1. Strain of right shoulder, subsequent encounter    2. Strain of right pectoralis muscle, subsequent encounter    Patient is under percent improved.  Not endorsing any pain or symptoms at this time.  D 39 completed.  She is discharged/MMI      "

## 2019-05-18 NOTE — LETTER
[facility] [address] - [place] [address]  Phone:  [phone] - Fax:  [phone]   [facility] Progress Report and Disability Certification  Date of Service: 5/18/2019   No Show:  No  Date / Time of Next Visit:     Claim Information   Patient Name: Shara Patricia  Claim Number:     Employer: [employer] Date of Injury: 4/28/2019     Insurer / TPA: [employer]  ID / SSN:     Occupation:   Diagnosis: Diagnoses of Strain of right shoulder, subsequent encounter and Strain of right pectoralis muscle, subsequent encounter were pertinent to this visit.    Medical Information   Related to Industrial Injury? Yes    Subjective Complaints:  DOI: 4/28/19 5/18/2019 Follow-up visit: Patient reports that she has been tolerating full duty at work with no complaints.  Says that she is 100% improved.  She is no longer taking any OTC medications for the pain.  No new injury.  Denies numbness/tingling/weakness.  She would like to be discharged from our care.   Objective Findings: Musculoskeletal:        Right shoulder: She exhibits normal range of motion, no tenderness, no bony tenderness, no swelling, no effusion, no crepitus, no deformity, no pain and normal strength.    Pre-Existing Condition(s):     Assessment:   Condition Improved    Status: Discharged /  MMI  Permanent Disability:No    Plan:      Diagnostics:      Comments:       Disability Information   Status: Released to Full Duty    From:     Through:   Restrictions are:     Physical Restrictions   Sitting:    Standing:    Stooping:    Bending:      Squatting:    Walking:    Climbing:    Pushing:      Pulling:    Other:    Reaching Above Shoulder (L):   Reaching Above Shoulder (R):       Reaching Below Shoulder (L):    Reaching Below Shoulder (R):      Not to exceed Weight Limits   Carrying(hrs):   Weight Limit(lb):   Lifting(hrs):    Weight  Limit(lb):     Comments:      Repetitive Actions   Hands: i.e. Fine Manipulations from Grasping:     Feet: i.e. Operating Foot Controls:     Driving / Operate Machinery:     Physician Name: Radha Timmons P.A.-C. Physician Signature: RADHA Barnett P.A.-C. e-Signature: Dr. Chad Zarate, Medical Director   Clinic Name / Location: [address]  [place] [address] Clinic Phone Number: Dept: [phone]   Appointment Time: 4:15 Pm Visit Start Time: 4:20 PM   Check-In Time:  4:08 Pm Visit Discharge Time:  4:52 PM   Original-Treating Physician or Chiropractor    Page 2-Insurer/TPA    Page 3-Employer    Page 4-Employee

## 2019-08-29 NOTE — ED NOTES
ERP at bedside for re-eval and d/c.   tolerate PO well, when no ER personnel in the room, he behaves very well, appears happy. D/c home stable.

## 2019-11-01 DIAGNOSIS — Z00.00 PREVENTATIVE HEALTH CARE: ICD-10-CM

## 2019-11-01 PROBLEM — J06.9 ACUTE URI: Status: RESOLVED | Noted: 2019-04-25 | Resolved: 2019-11-01

## 2019-11-08 ENCOUNTER — HOSPITAL ENCOUNTER (OUTPATIENT)
Dept: LAB | Facility: MEDICAL CENTER | Age: 29
End: 2019-11-08
Attending: PHYSICIAN ASSISTANT
Payer: COMMERCIAL

## 2019-11-08 DIAGNOSIS — Z00.00 PREVENTATIVE HEALTH CARE: ICD-10-CM

## 2019-11-08 LAB
25(OH)D3 SERPL-MCNC: 19 NG/ML (ref 30–100)
ALBUMIN SERPL BCP-MCNC: 4.3 G/DL (ref 3.2–4.9)
ALBUMIN/GLOB SERPL: 1.3 G/DL
ALP SERPL-CCNC: 44 U/L (ref 30–99)
ALT SERPL-CCNC: 19 U/L (ref 2–50)
ANION GAP SERPL CALC-SCNC: 10 MMOL/L (ref 0–11.9)
AST SERPL-CCNC: 21 U/L (ref 12–45)
BILIRUB SERPL-MCNC: 0.7 MG/DL (ref 0.1–1.5)
BUN SERPL-MCNC: 16 MG/DL (ref 8–22)
CALCIUM SERPL-MCNC: 9.1 MG/DL (ref 8.5–10.5)
CHLORIDE SERPL-SCNC: 106 MMOL/L (ref 96–112)
CHOLEST SERPL-MCNC: 199 MG/DL (ref 100–199)
CO2 SERPL-SCNC: 23 MMOL/L (ref 20–33)
CREAT SERPL-MCNC: 0.7 MG/DL (ref 0.5–1.4)
EST. AVERAGE GLUCOSE BLD GHB EST-MCNC: 108 MG/DL
GLOBULIN SER CALC-MCNC: 3.2 G/DL (ref 1.9–3.5)
GLUCOSE SERPL-MCNC: 91 MG/DL (ref 65–99)
HBA1C MFR BLD: 5.4 % (ref 0–5.6)
HDLC SERPL-MCNC: 71 MG/DL
LDLC SERPL CALC-MCNC: 95 MG/DL
POTASSIUM SERPL-SCNC: 3.7 MMOL/L (ref 3.6–5.5)
PROT SERPL-MCNC: 7.5 G/DL (ref 6–8.2)
SODIUM SERPL-SCNC: 139 MMOL/L (ref 135–145)
TRIGL SERPL-MCNC: 167 MG/DL (ref 0–149)
TSH SERPL DL<=0.005 MIU/L-ACNC: 0.64 UIU/ML (ref 0.38–5.33)

## 2019-11-08 PROCEDURE — 84443 ASSAY THYROID STIM HORMONE: CPT

## 2019-11-08 PROCEDURE — 80053 COMPREHEN METABOLIC PANEL: CPT

## 2019-11-08 PROCEDURE — 36415 COLL VENOUS BLD VENIPUNCTURE: CPT

## 2019-11-08 PROCEDURE — 80061 LIPID PANEL: CPT

## 2019-11-08 PROCEDURE — 82306 VITAMIN D 25 HYDROXY: CPT

## 2019-11-08 PROCEDURE — 83036 HEMOGLOBIN GLYCOSYLATED A1C: CPT

## 2019-11-12 ENCOUNTER — OFFICE VISIT (OUTPATIENT)
Dept: MEDICAL GROUP | Facility: MEDICAL CENTER | Age: 29
End: 2019-11-12
Payer: COMMERCIAL

## 2019-11-12 VITALS
HEIGHT: 64 IN | WEIGHT: 145.5 LBS | BODY MASS INDEX: 24.84 KG/M2 | HEART RATE: 86 BPM | RESPIRATION RATE: 16 BRPM | DIASTOLIC BLOOD PRESSURE: 78 MMHG | SYSTOLIC BLOOD PRESSURE: 122 MMHG | OXYGEN SATURATION: 97 % | TEMPERATURE: 98.3 F

## 2019-11-12 DIAGNOSIS — J10.1 INFLUENZA B: ICD-10-CM

## 2019-11-12 DIAGNOSIS — R50.9 FEVER, UNSPECIFIED FEVER CAUSE: ICD-10-CM

## 2019-11-12 DIAGNOSIS — R79.89 LOW VITAMIN D LEVEL: ICD-10-CM

## 2019-11-12 PROBLEM — R68.89 FLU-LIKE SYMPTOMS: Status: ACTIVE | Noted: 2019-11-12

## 2019-11-12 LAB
FLUAV+FLUBV AG SPEC QL IA: NORMAL
INT CON NEG: NORMAL
INT CON POS: NORMAL

## 2019-11-12 PROCEDURE — 87804 INFLUENZA ASSAY W/OPTIC: CPT | Performed by: PHYSICIAN ASSISTANT

## 2019-11-12 PROCEDURE — 99214 OFFICE O/P EST MOD 30 MIN: CPT | Performed by: PHYSICIAN ASSISTANT

## 2019-11-12 RX ORDER — PROMETHAZINE HYDROCHLORIDE AND CODEINE PHOSPHATE 6.25; 1 MG/5ML; MG/5ML
5 SYRUP ORAL NIGHTLY PRN
Qty: 160 ML | Refills: 0 | Status: SHIPPED | OUTPATIENT
Start: 2019-11-12 | End: 2019-12-12

## 2019-11-12 NOTE — LETTER
November 12, 2019         Patient: Shara Patricia   YOB: 1990   Date of Visit: 11/12/2019           To Whom it May Concern:    Shara Patricia was seen in my clinic on 11/12/2019. She may return to work on 11/18/19.    If you have any questions or concerns, please don't hesitate to call.        Sincerely,           Alvin Mcfarlane P.A.-C.  Electronically Signed

## 2019-11-12 NOTE — LETTER
November 12, 2019         Patient: Shara Patricia   YOB: 1990   Date of Visit: 11/12/2019           To Whom it May Concern:    Shara Patricia was seen in my clinic on 11/12/2019. She may return to work on 11/14/19.    If you have any questions or concerns, please don't hesitate to call.        Sincerely,           Alvin Mcfarlane P.A.-C.  Electronically Signed

## 2019-11-13 NOTE — PROGRESS NOTES
"Subjective:   Shara Patricia is a 29 y.o. female here today for influenza symptoms for 3 days and low vitamin D.    Flu-like symptoms  This is a 29-year-old female who I saw last week and provided a flu vaccine.  On Saturday she developed flulike symptoms.  Associated fevers.  Highest at 102.  On Sunday then her  got sick as well with even higher temperatures.  She has associated nasal congestion and drainage and coughing.  Sneezing.  Sore throat.  Watery eyes.  Denies any nausea vomiting or chest pain with shortness of breath.  Fevers have improved.  She been taking over-the-counter cold medication and antipyretics.  She went to work today but was sent home because of her illness.    Low vitamin D level  Chronic condition.  Stop taking her vitamin D supplement.  Vitamin D level has gone back down into the lower ranges.  Taking a vitamin D supplement it was at 38.  Other labs recently showed much improved A1c.  Cholesterol improved.         Current medicines (including changes today)  Current Outpatient Medications   Medication Sig Dispense Refill   • promethazine-codeine (PHENERGAN-CODEINE) 6.25-10 MG/5ML Syrup Take 5 mL by mouth at bedtime as needed for up to 30 days. 160 mL 0   • norethindrone-ethinyl estradiol (CYCLAFEM 7/7/7) 0.5/0.75/1-35 MG-MCG per tablet Take 1 Tab by mouth every day. 84 Tab 3     No current facility-administered medications for this visit.      She  has a past medical history of Head ache.    Social History and Family History were reviewed and updated.    ROS   No chest pain, no shortness of breath, no abdominal pain and all other systems were reviewed and are negative.       Objective:     /78 (BP Location: Left arm, Patient Position: Sitting, BP Cuff Size: Adult)   Pulse 86   Temp 36.8 °C (98.3 °F) (Temporal)   Resp 16   Ht 1.626 m (5' 4\")   Wt 66 kg (145 lb 8.1 oz)   SpO2 97%  Body mass index is 24.98 kg/m².   Physical Exam:  Constitutional: Alert, no distress.  " Coughing.  Blowing her nose.  Skin: Warm, dry, good turgor, no rashes in visible areas.  Eye: Equal, round and reactive, conjunctiva clear, lids normal.  ENMT: Lips without lesions, good dentition, oropharynx with slight erythema.  No exudate.  Nose tonsillar swelling.  Neck: Trachea midline, no masses.   Lymph: No cervical or supraclavicular lymphadenopathy  Respiratory: Unlabored respiratory effort, lungs clear to auscultation, no wheezes, no ronchi.  Cardiovascular: Normal S1, S2, no murmur, no edema.  Psych: Alert and oriented x3, normal affect and mood.        Assessment and Plan:   The following treatment plan was discussed    1.  Influenza B  Acute, new onset condition.  Influenza B testing positive.  Discussed viral process.  Note written to return to work on Monday.  Continue over-the-counter cold medications as directed.  Rest.  Provided a cough syrup with codeine.  Do not drink or drive.  Do not take with other cold medications.  Take at nighttime prior to bedtime.  Advised to go to the emergency room with any worsening symptoms such as chest pain with shortness of breath.  - POCT Influenza A/B  - promethazine-codeine (PHENERGAN-CODEINE) 6.25-10 MG/5ML Syrup; Take 5 mL by mouth at bedtime as needed for up to 30 days.  Dispense: 160 mL; Refill: 0    2. Fever, unspecified fever cause  Acute, new onset condition.  Symptoms related to a viral infection.  Continue over-the-counter nonsteroidals as directed.  Continue to monitor symptoms.  If she develops any worsening symptoms such as fever after 1 week, shortness of breath or chest pain she may go to urgent care or contact me.  - POCT Influenza A/B    3. Low vitamin D level  Chronic condition.  Advised to restart over-the-counter vitamin D supplement.      Followup: Return if symptoms worsen or fail to improve.    Please note that this dictation was created using voice recognition software. I have made every reasonable attempt to correct obvious errors, but I  expect that there are errors of grammar and possibly content that I did not discover before finalizing the note.

## 2019-11-13 NOTE — ASSESSMENT & PLAN NOTE
This is a 29-year-old female who I saw last week and provided a flu vaccine.  On Saturday she developed flulike symptoms.  Associated fevers.  Highest at 102.  On Sunday then her  got sick as well with even higher temperatures.  She has associated nasal congestion and drainage and coughing.  Sneezing.  Sore throat.  Watery eyes.  Denies any nausea vomiting or chest pain with shortness of breath.  Fevers have improved.  She been taking over-the-counter cold medication and antipyretics.  She went to work today but was sent home because of her illness.

## 2019-11-13 NOTE — ASSESSMENT & PLAN NOTE
Chronic condition.  Stop taking her vitamin D supplement.  Vitamin D level has gone back down into the lower ranges.  Taking a vitamin D supplement it was at 38.  Other labs recently showed much improved A1c.  Cholesterol improved.

## 2020-02-29 NOTE — ASSESSMENT & PLAN NOTE
Has chronic low back pain which exacerbates from time to time. No sciatica. Went to PT and was told to perform her exercises. She was not able to perform the exercises and was told that she is out of shape.   SCFE (slipped capital femoral epiphysis), right

## 2020-10-27 ENCOUNTER — PATIENT MESSAGE (OUTPATIENT)
Dept: MEDICAL GROUP | Facility: MEDICAL CENTER | Age: 30
End: 2020-10-27

## 2020-10-27 DIAGNOSIS — N91.2 AMENORRHEA: ICD-10-CM

## 2020-10-27 DIAGNOSIS — Z00.00 PREVENTATIVE HEALTH CARE: ICD-10-CM

## 2020-10-27 PROBLEM — R50.9 FEVER: Status: RESOLVED | Noted: 2019-11-12 | Resolved: 2020-10-27

## 2020-10-27 PROBLEM — J10.1 INFLUENZA B: Status: RESOLVED | Noted: 2019-11-12 | Resolved: 2020-10-27

## 2020-10-27 NOTE — TELEPHONE ENCOUNTER
From: Shara AMADOR Am Is  To: Alvin Mcfarlane P.A.-C.  Sent: 10/27/2020 7:35 AM PDT  Subject: Non-Urgent Medical Question    Morning! I have a physical exam coming up on November 20th. I was wondering if I can get some labs done? A1C, thyroid, cholesterol, vitamin D, etc. Also, my sister was recently diagnosed with PCOS. She said she got bloodwork done for that. Is that something I can also check too?     Thank you in advance!

## 2020-11-12 ENCOUNTER — HOSPITAL ENCOUNTER (OUTPATIENT)
Dept: LAB | Facility: MEDICAL CENTER | Age: 30
End: 2020-11-12
Attending: PHYSICIAN ASSISTANT
Payer: COMMERCIAL

## 2020-11-12 DIAGNOSIS — Z00.00 PREVENTATIVE HEALTH CARE: ICD-10-CM

## 2020-11-12 DIAGNOSIS — N91.2 AMENORRHEA: ICD-10-CM

## 2020-11-12 LAB
25(OH)D3 SERPL-MCNC: 26 NG/ML (ref 30–100)
ALBUMIN SERPL BCP-MCNC: 4.3 G/DL (ref 3.2–4.9)
ALBUMIN/GLOB SERPL: 1.3 G/DL
ALP SERPL-CCNC: 45 U/L (ref 30–99)
ALT SERPL-CCNC: 26 U/L (ref 2–50)
ANION GAP SERPL CALC-SCNC: 10 MMOL/L (ref 7–16)
AST SERPL-CCNC: 18 U/L (ref 12–45)
BASOPHILS # BLD AUTO: 0.4 % (ref 0–1.8)
BASOPHILS # BLD: 0.02 K/UL (ref 0–0.12)
BILIRUB SERPL-MCNC: 0.4 MG/DL (ref 0.1–1.5)
BUN SERPL-MCNC: 16 MG/DL (ref 8–22)
CALCIUM SERPL-MCNC: 9.1 MG/DL (ref 8.5–10.5)
CHLORIDE SERPL-SCNC: 103 MMOL/L (ref 96–112)
CHOLEST SERPL-MCNC: 171 MG/DL (ref 100–199)
CO2 SERPL-SCNC: 24 MMOL/L (ref 20–33)
CREAT SERPL-MCNC: 0.75 MG/DL (ref 0.5–1.4)
EOSINOPHIL # BLD AUTO: 0.17 K/UL (ref 0–0.51)
EOSINOPHIL NFR BLD: 3 % (ref 0–6.9)
ERYTHROCYTE [DISTWIDTH] IN BLOOD BY AUTOMATED COUNT: 38.5 FL (ref 35.9–50)
FASTING STATUS PATIENT QL REPORTED: NORMAL
GLOBULIN SER CALC-MCNC: 3.2 G/DL (ref 1.9–3.5)
GLUCOSE SERPL-MCNC: 96 MG/DL (ref 65–99)
HCT VFR BLD AUTO: 43.1 % (ref 37–47)
HDLC SERPL-MCNC: 61 MG/DL
HGB BLD-MCNC: 14.6 G/DL (ref 12–16)
IMM GRANULOCYTES # BLD AUTO: 0.02 K/UL (ref 0–0.11)
IMM GRANULOCYTES NFR BLD AUTO: 0.4 % (ref 0–0.9)
LDLC SERPL CALC-MCNC: 78 MG/DL
LYMPHOCYTES # BLD AUTO: 2.16 K/UL (ref 1–4.8)
LYMPHOCYTES NFR BLD: 38 % (ref 22–41)
MCH RBC QN AUTO: 29.5 PG (ref 27–33)
MCHC RBC AUTO-ENTMCNC: 33.9 G/DL (ref 33.6–35)
MCV RBC AUTO: 87.1 FL (ref 81.4–97.8)
MONOCYTES # BLD AUTO: 0.4 K/UL (ref 0–0.85)
MONOCYTES NFR BLD AUTO: 7 % (ref 0–13.4)
NEUTROPHILS # BLD AUTO: 2.92 K/UL (ref 2–7.15)
NEUTROPHILS NFR BLD: 51.2 % (ref 44–72)
NRBC # BLD AUTO: 0 K/UL
NRBC BLD-RTO: 0 /100 WBC
PLATELET # BLD AUTO: 277 K/UL (ref 164–446)
PMV BLD AUTO: 9.4 FL (ref 9–12.9)
POTASSIUM SERPL-SCNC: 3.9 MMOL/L (ref 3.6–5.5)
PROT SERPL-MCNC: 7.5 G/DL (ref 6–8.2)
RBC # BLD AUTO: 4.95 M/UL (ref 4.2–5.4)
SODIUM SERPL-SCNC: 137 MMOL/L (ref 135–145)
TRIGL SERPL-MCNC: 162 MG/DL (ref 0–149)
TSH SERPL DL<=0.005 MIU/L-ACNC: 1.2 UIU/ML (ref 0.38–5.33)
WBC # BLD AUTO: 5.7 K/UL (ref 4.8–10.8)

## 2020-11-12 PROCEDURE — 80053 COMPREHEN METABOLIC PANEL: CPT

## 2020-11-12 PROCEDURE — 84270 ASSAY OF SEX HORMONE GLOBUL: CPT

## 2020-11-12 PROCEDURE — 83036 HEMOGLOBIN GLYCOSYLATED A1C: CPT

## 2020-11-12 PROCEDURE — 82306 VITAMIN D 25 HYDROXY: CPT

## 2020-11-12 PROCEDURE — 84402 ASSAY OF FREE TESTOSTERONE: CPT

## 2020-11-12 PROCEDURE — 80061 LIPID PANEL: CPT

## 2020-11-12 PROCEDURE — 36415 COLL VENOUS BLD VENIPUNCTURE: CPT

## 2020-11-12 PROCEDURE — 85025 COMPLETE CBC W/AUTO DIFF WBC: CPT

## 2020-11-12 PROCEDURE — 84403 ASSAY OF TOTAL TESTOSTERONE: CPT

## 2020-11-12 PROCEDURE — 84443 ASSAY THYROID STIM HORMONE: CPT

## 2020-11-13 ENCOUNTER — APPOINTMENT (OUTPATIENT)
Dept: LAB | Facility: MEDICAL CENTER | Age: 30
End: 2020-11-13
Attending: PHYSICIAN ASSISTANT
Payer: COMMERCIAL

## 2020-11-13 LAB
EST. AVERAGE GLUCOSE BLD GHB EST-MCNC: 111 MG/DL
HBA1C MFR BLD: 5.5 % (ref 0–5.6)

## 2020-11-17 LAB
SHBG SERPL-SCNC: 69 NMOL/L (ref 30–135)
TESTOST FREE SERPL-MCNC: 5 PG/ML (ref 1.3–9.2)
TESTOST SERPL-MCNC: 48 NG/DL (ref 9–55)

## 2020-11-19 RX ORDER — NORETHINDRONE ACETATE AND ETHINYL ESTRADIOL .02; 1 MG/1; MG/1
1 TABLET ORAL
COMMUNITY
Start: 2020-10-17 | End: 2021-04-16

## 2020-11-20 ENCOUNTER — APPOINTMENT (OUTPATIENT)
Dept: MEDICAL GROUP | Facility: MEDICAL CENTER | Age: 30
End: 2020-11-20
Payer: COMMERCIAL

## 2021-01-31 DIAGNOSIS — Z62.810 HISTORY OF SEXUAL ABUSE IN CHILDHOOD: ICD-10-CM

## 2021-04-16 ENCOUNTER — OFFICE VISIT (OUTPATIENT)
Dept: MEDICAL GROUP | Facility: MEDICAL CENTER | Age: 31
End: 2021-04-16
Payer: COMMERCIAL

## 2021-04-16 ENCOUNTER — HOSPITAL ENCOUNTER (OUTPATIENT)
Dept: LAB | Facility: MEDICAL CENTER | Age: 31
End: 2021-04-16
Attending: PHYSICIAN ASSISTANT
Payer: COMMERCIAL

## 2021-04-16 VITALS
BODY MASS INDEX: 25.61 KG/M2 | WEIGHT: 150 LBS | HEIGHT: 64 IN | OXYGEN SATURATION: 100 % | SYSTOLIC BLOOD PRESSURE: 122 MMHG | HEART RATE: 50 BPM | RESPIRATION RATE: 16 BRPM | DIASTOLIC BLOOD PRESSURE: 84 MMHG | TEMPERATURE: 97.8 F

## 2021-04-16 DIAGNOSIS — R73.09 ELEVATED GLUCOSE: ICD-10-CM

## 2021-04-16 DIAGNOSIS — Z83.3 FAMILY HISTORY OF DIABETES MELLITUS TYPE II: ICD-10-CM

## 2021-04-16 DIAGNOSIS — Z11.4 ENCOUNTER FOR SCREENING FOR HIV: ICD-10-CM

## 2021-04-16 PROBLEM — E28.2 PCOS (POLYCYSTIC OVARIAN SYNDROME): Status: ACTIVE | Noted: 2021-04-16

## 2021-04-16 LAB
EST. AVERAGE GLUCOSE BLD GHB EST-MCNC: 100 MG/DL
HBA1C MFR BLD: 5.1 % (ref 4–5.6)
HIV 1+2 AB+HIV1 P24 AG SERPL QL IA: NORMAL

## 2021-04-16 PROCEDURE — 83036 HEMOGLOBIN GLYCOSYLATED A1C: CPT

## 2021-04-16 PROCEDURE — 36415 COLL VENOUS BLD VENIPUNCTURE: CPT

## 2021-04-16 PROCEDURE — 87389 HIV-1 AG W/HIV-1&-2 AB AG IA: CPT

## 2021-04-16 PROCEDURE — 99213 OFFICE O/P EST LOW 20 MIN: CPT | Performed by: PHYSICIAN ASSISTANT

## 2021-04-16 ASSESSMENT — FIBROSIS 4 INDEX: FIB4 SCORE: 0.41

## 2021-04-16 ASSESSMENT — PATIENT HEALTH QUESTIONNAIRE - PHQ9: CLINICAL INTERPRETATION OF PHQ2 SCORE: 0

## 2021-04-16 NOTE — ASSESSMENT & PLAN NOTE
This is a pleasant 31-year-old female here today to follow-up on her health.  She is requesting an A1c.  She has not been eating well recently.  Poor diet habits.  She has a family history of diabetes.  Her grandmother passed away from diabetes.  Last A1c was at 5.5.  In the past she did have an elevated glucose test.    She was recently diagnosed with PCOS.  Followed by gynecology.  She will have an ultrasound soon as she has been having right-sided pelvic pain.  She stopped birth control in January in order to help conceive.  She has stresses regarding trying to conceive.  Both of her sisters who are older and younger than her have PCOS.  She does have one child who is 9 years of age.    She would like an HIV test done.  She had a friend recently who was positive for HIV.  No sexual interactions.

## 2021-04-16 NOTE — PROGRESS NOTES
"Subjective:   Shara Patricia is a 31 y.o. female here today for elevated glucose and HIV screening.    Elevated glucose  This is a pleasant 31-year-old female here today to follow-up on her health.  She is requesting an A1c.  She has not been eating well recently.  Poor diet habits.  She has a family history of diabetes.  Her grandmother passed away from diabetes.  Last A1c was at 5.5.  In the past she did have an elevated glucose test.    She was recently diagnosed with PCOS.  Followed by gynecology.  She will have an ultrasound soon as she has been having right-sided pelvic pain.  She stopped birth control in January in order to help conceive.  She has stresses regarding trying to conceive.  Both of her sisters who are older and younger than her have PCOS.  She does have one child who is 9 years of age.    She would like an HIV test done.  She had a friend recently who was positive for HIV.  No sexual interactions.       Current medicines (including changes today)  Current Outpatient Medications   Medication Sig Dispense Refill   • Multiple Vitamin (MULTIVITAMIN ADULT PO) Take 1 tablet by mouth every day.       No current facility-administered medications for this visit.     She  has a past medical history of Head ache.    Social History and Family History were reviewed and updated.    ROS   No chest pain, no shortness of breath, no abdominal pain and all other systems were reviewed and are negative.       Objective:     /84   Pulse (!) 50   Temp 36.6 °C (97.8 °F) (Temporal)   Resp 16   Ht 1.626 m (5' 4\")   Wt 68 kg (150 lb)   SpO2 100%  Body mass index is 25.75 kg/m².   Physical Exam:  Constitutional: Alert, no distress.  Skin: Warm, dry, good turgor, no rashes in visible areas.  Eye: Equal, round and reactive, conjunctiva clear, lids normal.  ENMT: Lips without lesions, good dentition, oropharynx clear.  Neck: Trachea midline, no masses.   Lymph: No cervical or supraclavicular " lymphadenopathy  Respiratory: Unlabored respiratory effort, lungs appear clear, no wheezes.  Cardiovascular: Regular rate rhythm.  Psych: Alert and oriented x3, normal affect and mood.        Assessment and Plan:   The following treatment plan was discussed    1. Elevated glucose  Prior condition.  Last A1c in normal range but elevated in normal range.  Will check A1c.  Discussed lifestyle modifications.  Dietary changes.  - HEMOGLOBIN A1C; Future    2. Encounter for screening for HIV  HIV screen ordered.  May perform labs nonfasting.  - HIV AG/AB COMBO ASSAY SCREENING; Future      Followup: Return in about 1 year (around 4/16/2022), or if symptoms worsen or fail to improve.    Please note that this dictation was created using voice recognition software. I have made every reasonable attempt to correct obvious errors, but I expect that there are errors of grammar and possibly content that I did not discover before finalizing the note.

## 2021-10-13 ENCOUNTER — OFFICE VISIT (OUTPATIENT)
Dept: MEDICAL GROUP | Facility: MEDICAL CENTER | Age: 31
End: 2021-10-13
Payer: COMMERCIAL

## 2021-10-13 VITALS
SYSTOLIC BLOOD PRESSURE: 131 MMHG | RESPIRATION RATE: 16 BRPM | DIASTOLIC BLOOD PRESSURE: 88 MMHG | OXYGEN SATURATION: 98 % | BODY MASS INDEX: 24.92 KG/M2 | WEIGHT: 146 LBS | TEMPERATURE: 98.5 F | HEIGHT: 64 IN | HEART RATE: 63 BPM

## 2021-10-13 DIAGNOSIS — I10 ESSENTIAL HYPERTENSION: ICD-10-CM

## 2021-10-13 DIAGNOSIS — Z00.00 PREVENTATIVE HEALTH CARE: ICD-10-CM

## 2021-10-13 PROBLEM — R73.09 ELEVATED GLUCOSE: Status: RESOLVED | Noted: 2021-04-16 | Resolved: 2021-10-13

## 2021-10-13 PROCEDURE — 99214 OFFICE O/P EST MOD 30 MIN: CPT | Performed by: PHYSICIAN ASSISTANT

## 2021-10-13 ASSESSMENT — ANXIETY QUESTIONNAIRES
IF YOU CHECKED OFF ANY PROBLEMS ON THIS QUESTIONNAIRE, HOW DIFFICULT HAVE THESE PROBLEMS MADE IT FOR YOU TO DO YOUR WORK, TAKE CARE OF THINGS AT HOME, OR GET ALONG WITH OTHER PEOPLE: SOMEWHAT DIFFICULT
6. BECOMING EASILY ANNOYED OR IRRITABLE: NOT AT ALL
4. TROUBLE RELAXING: MORE THAN HALF THE DAYS
1. FEELING NERVOUS, ANXIOUS, OR ON EDGE: SEVERAL DAYS
2. NOT BEING ABLE TO STOP OR CONTROL WORRYING: NEARLY EVERY DAY
7. FEELING AFRAID AS IF SOMETHING AWFUL MIGHT HAPPEN: SEVERAL DAYS
GAD7 TOTAL SCORE: 9
5. BEING SO RESTLESS THAT IT IS HARD TO SIT STILL: NOT AT ALL
3. WORRYING TOO MUCH ABOUT DIFFERENT THINGS: MORE THAN HALF THE DAYS

## 2021-10-13 ASSESSMENT — FIBROSIS 4 INDEX: FIB4 SCORE: 0.41

## 2021-10-13 NOTE — PROGRESS NOTES
"Subjective:   Shara Patricia is a 31 y.o. female here today for new onset hypertension.    Essential hypertension  This is a pleasant 31-year-old female who is here today to have her blood pressure reviewed.  Has been elevated in the past few weeks.  Taken once for a safety class for her employer.  It was much higher than today.  She does have a cuff that she uses at home.  Has been running as high as systolic at 160 but diastolic in the 130s.  She took her blood pressure at home in the morning recently it was 140/94.  She complains of stress over the past couple weeks.  Her grandfather passed away 2 weeks ago.  He was still maneuvering a motorcycle and had some brain injury.  She is also been working more at work.  She works swing shifts and night shifts.  As of today she has changed her work schedule.  She plans to work only during the day.       Current medicines (including changes today)  No current outpatient medications on file.     No current facility-administered medications for this visit.     She  has a past medical history of Head ache.    Social History and Family History were reviewed and updated.    ROS   No chest pain, no shortness of breath, no abdominal pain and all other systems were reviewed and are negative.       Objective:     /88 (BP Location: Right arm, Patient Position: Sitting, BP Cuff Size: Adult)   Pulse 63   Temp 36.9 °C (98.5 °F) (Temporal)   Resp 16   Ht 1.626 m (5' 4\")   Wt 66.2 kg (146 lb)   SpO2 98%  Body mass index is 25.06 kg/m².   Physical Exam:  Constitutional: Alert, no distress.  Skin: Warm, dry, good turgor, no rashes in visible areas.  Eye: Equal, round and reactive, conjunctiva clear, lids normal.  ENMT: Lips without lesions, good dentition, oropharynx clear.  Neck: Trachea midline, no masses.   Lymph: No cervical or supraclavicular lymphadenopathy  Respiratory: Unlabored respiratory effort, lungs appear clear, no wheezes.  Cardiovascular: Regular rate and " rhythm.  Psych: Alert and oriented x3, normal affect and mood.        Assessment and Plan:   The following treatment plan was discussed    1. Essential hypertension  New condition noted in chart.  Unlikely chronic.  Blood pressure in April was in normal range.  Blood pressure was repeated a couple times.  Levels are consistent with hypertension.  First she will change her work schedule and work on days.  She will reduce her stresses by exercising routinely.  Watch salt intake.  Consider Dash or Mediterranean diet.  Her blood pressure remains elevated we will put her on a ACE inhibitor.  Hopefully in time her blood pressure would be better controlled.    2. Preventative health care  Order labs fasting.  She will be contacted with the results.  Advised to get them done given her recent new onset of hypertension.  - TSH WITH REFLEX TO FT4; Future  - HEMOGLOBIN A1C; Future  - CBC WITH DIFFERENTIAL; Future  - Comp Metabolic Panel; Future  - Lipid Profile; Future         Followup: Return if symptoms worsen or fail to improve.    Please note that this dictation was created using voice recognition software. I have made every reasonable attempt to correct obvious errors, but I expect that there are errors of grammar and possibly content that I did not discover before finalizing the note.

## 2021-10-13 NOTE — ASSESSMENT & PLAN NOTE
This is a pleasant 31-year-old female who is here today to have her blood pressure reviewed.  Has been elevated in the past few weeks.  Taken once for a safety class for her employer.  It was much higher than today.  She does have a cuff that she uses at home.  Has been running as high as systolic at 160 but diastolic in the 130s.  She took her blood pressure at home in the morning recently it was 140/94.  She complains of stress over the past couple weeks.  Her grandfather passed away 2 weeks ago.  He was still maneuvering a motorcycle and had some brain injury.  She is also been working more at work.  She works swing shifts and night shifts.  As of today she has changed her work schedule.  She plans to work only during the day.

## 2021-10-22 ENCOUNTER — HOSPITAL ENCOUNTER (OUTPATIENT)
Dept: LAB | Facility: MEDICAL CENTER | Age: 31
End: 2021-10-22
Attending: OPHTHALMOLOGY
Payer: COMMERCIAL

## 2021-10-22 DIAGNOSIS — Z00.00 PREVENTATIVE HEALTH CARE: ICD-10-CM

## 2021-10-22 LAB
ALBUMIN SERPL BCP-MCNC: 4.3 G/DL (ref 3.2–4.9)
ALBUMIN/GLOB SERPL: 1.4 G/DL
ALP SERPL-CCNC: 42 U/L (ref 30–99)
ALT SERPL-CCNC: 28 U/L (ref 2–50)
ANION GAP SERPL CALC-SCNC: 10 MMOL/L (ref 7–16)
AST SERPL-CCNC: 22 U/L (ref 12–45)
BASOPHILS # BLD AUTO: 0.3 % (ref 0–1.8)
BASOPHILS # BLD: 0.02 K/UL (ref 0–0.12)
BILIRUB SERPL-MCNC: 0.3 MG/DL (ref 0.1–1.5)
BUN SERPL-MCNC: 11 MG/DL (ref 8–22)
CALCIUM SERPL-MCNC: 9 MG/DL (ref 8.5–10.5)
CHLORIDE SERPL-SCNC: 106 MMOL/L (ref 96–112)
CHOLEST SERPL-MCNC: 149 MG/DL (ref 100–199)
CO2 SERPL-SCNC: 22 MMOL/L (ref 20–33)
CREAT SERPL-MCNC: 0.79 MG/DL (ref 0.5–1.4)
EOSINOPHIL # BLD AUTO: 0.18 K/UL (ref 0–0.51)
EOSINOPHIL NFR BLD: 3 % (ref 0–6.9)
ERYTHROCYTE [DISTWIDTH] IN BLOOD BY AUTOMATED COUNT: 38.9 FL (ref 35.9–50)
EST. AVERAGE GLUCOSE BLD GHB EST-MCNC: 103 MG/DL
FASTING STATUS PATIENT QL REPORTED: NORMAL
GLOBULIN SER CALC-MCNC: 3 G/DL (ref 1.9–3.5)
GLUCOSE SERPL-MCNC: 98 MG/DL (ref 65–99)
HBA1C MFR BLD: 5.2 % (ref 4–5.6)
HCT VFR BLD AUTO: 40.9 % (ref 37–47)
HDLC SERPL-MCNC: 54 MG/DL
HGB BLD-MCNC: 14.3 G/DL (ref 12–16)
IMM GRANULOCYTES # BLD AUTO: 0.02 K/UL (ref 0–0.11)
IMM GRANULOCYTES NFR BLD AUTO: 0.3 % (ref 0–0.9)
LDLC SERPL CALC-MCNC: 70 MG/DL
LYMPHOCYTES # BLD AUTO: 2.34 K/UL (ref 1–4.8)
LYMPHOCYTES NFR BLD: 39.5 % (ref 22–41)
MCH RBC QN AUTO: 30.3 PG (ref 27–33)
MCHC RBC AUTO-ENTMCNC: 35 G/DL (ref 33.6–35)
MCV RBC AUTO: 86.7 FL (ref 81.4–97.8)
MONOCYTES # BLD AUTO: 0.43 K/UL (ref 0–0.85)
MONOCYTES NFR BLD AUTO: 7.3 % (ref 0–13.4)
NEUTROPHILS # BLD AUTO: 2.94 K/UL (ref 2–7.15)
NEUTROPHILS NFR BLD: 49.6 % (ref 44–72)
NRBC # BLD AUTO: 0 K/UL
NRBC BLD-RTO: 0 /100 WBC
PLATELET # BLD AUTO: 300 K/UL (ref 164–446)
PMV BLD AUTO: 9.8 FL (ref 9–12.9)
POTASSIUM SERPL-SCNC: 3.8 MMOL/L (ref 3.6–5.5)
PROT SERPL-MCNC: 7.3 G/DL (ref 6–8.2)
RBC # BLD AUTO: 4.72 M/UL (ref 4.2–5.4)
SODIUM SERPL-SCNC: 138 MMOL/L (ref 135–145)
TRIGL SERPL-MCNC: 127 MG/DL (ref 0–149)
TSH SERPL DL<=0.005 MIU/L-ACNC: 1.22 UIU/ML (ref 0.38–5.33)
WBC # BLD AUTO: 5.9 K/UL (ref 4.8–10.8)

## 2021-10-22 PROCEDURE — 36415 COLL VENOUS BLD VENIPUNCTURE: CPT

## 2021-10-22 PROCEDURE — 83036 HEMOGLOBIN GLYCOSYLATED A1C: CPT

## 2021-10-22 PROCEDURE — 80053 COMPREHEN METABOLIC PANEL: CPT

## 2021-10-22 PROCEDURE — 85025 COMPLETE CBC W/AUTO DIFF WBC: CPT

## 2021-10-22 PROCEDURE — 80061 LIPID PANEL: CPT

## 2021-10-22 PROCEDURE — 84443 ASSAY THYROID STIM HORMONE: CPT

## 2021-12-08 ENCOUNTER — HOSPITAL ENCOUNTER (OUTPATIENT)
Facility: MEDICAL CENTER | Age: 31
End: 2021-12-08
Attending: SPECIALIST
Payer: COMMERCIAL

## 2021-12-08 PROCEDURE — 87624 HPV HI-RISK TYP POOLED RSLT: CPT

## 2021-12-08 PROCEDURE — 88175 CYTOPATH C/V AUTO FLUID REDO: CPT

## 2021-12-08 PROCEDURE — 87591 N.GONORRHOEAE DNA AMP PROB: CPT

## 2021-12-08 PROCEDURE — 87491 CHLMYD TRACH DNA AMP PROBE: CPT

## 2022-01-19 ENCOUNTER — HOSPITAL ENCOUNTER (OUTPATIENT)
Facility: MEDICAL CENTER | Age: 32
End: 2022-01-19
Attending: SPECIALIST
Payer: COMMERCIAL

## 2022-01-19 PROCEDURE — 88305 TISSUE EXAM BY PATHOLOGIST: CPT

## 2022-01-20 LAB — PATHOLOGY CONSULT NOTE: NORMAL

## 2022-11-18 DIAGNOSIS — Z00.00 PREVENTATIVE HEALTH CARE: ICD-10-CM

## 2022-11-18 DIAGNOSIS — Z11.59 ENCOUNTER FOR HEPATITIS C SCREENING TEST FOR LOW RISK PATIENT: ICD-10-CM

## 2022-12-01 ENCOUNTER — HOSPITAL ENCOUNTER (OUTPATIENT)
Dept: LAB | Facility: MEDICAL CENTER | Age: 32
End: 2022-12-01
Attending: PHYSICIAN ASSISTANT
Payer: COMMERCIAL

## 2022-12-01 DIAGNOSIS — Z00.00 PREVENTATIVE HEALTH CARE: ICD-10-CM

## 2022-12-01 DIAGNOSIS — Z11.59 ENCOUNTER FOR HEPATITIS C SCREENING TEST FOR LOW RISK PATIENT: ICD-10-CM

## 2022-12-01 LAB
ALBUMIN SERPL BCP-MCNC: 4.5 G/DL (ref 3.2–4.9)
ALBUMIN/GLOB SERPL: 1.4 G/DL
ALP SERPL-CCNC: 62 U/L (ref 30–99)
ALT SERPL-CCNC: 106 U/L (ref 2–50)
ANION GAP SERPL CALC-SCNC: 12 MMOL/L (ref 7–16)
AST SERPL-CCNC: 83 U/L (ref 12–45)
BASOPHILS # BLD AUTO: 0.6 % (ref 0–1.8)
BASOPHILS # BLD: 0.03 K/UL (ref 0–0.12)
BILIRUB SERPL-MCNC: 0.8 MG/DL (ref 0.1–1.5)
BUN SERPL-MCNC: 12 MG/DL (ref 8–22)
CALCIUM SERPL-MCNC: 9.5 MG/DL (ref 8.5–10.5)
CHLORIDE SERPL-SCNC: 106 MMOL/L (ref 96–112)
CHOLEST SERPL-MCNC: 186 MG/DL (ref 100–199)
CO2 SERPL-SCNC: 23 MMOL/L (ref 20–33)
CREAT SERPL-MCNC: 0.93 MG/DL (ref 0.5–1.4)
EOSINOPHIL # BLD AUTO: 0.23 K/UL (ref 0–0.51)
EOSINOPHIL NFR BLD: 4.8 % (ref 0–6.9)
ERYTHROCYTE [DISTWIDTH] IN BLOOD BY AUTOMATED COUNT: 36.3 FL (ref 35.9–50)
EST. AVERAGE GLUCOSE BLD GHB EST-MCNC: 108 MG/DL
FASTING STATUS PATIENT QL REPORTED: NORMAL
GFR SERPLBLD CREATININE-BSD FMLA CKD-EPI: 83 ML/MIN/1.73 M 2
GLOBULIN SER CALC-MCNC: 3.3 G/DL (ref 1.9–3.5)
GLUCOSE SERPL-MCNC: 115 MG/DL (ref 65–99)
HBA1C MFR BLD: 5.4 % (ref 4–5.6)
HCT VFR BLD AUTO: 42.1 % (ref 37–47)
HCV AB SER QL: NORMAL
HDLC SERPL-MCNC: 44 MG/DL
HGB BLD-MCNC: 14.5 G/DL (ref 12–16)
IMM GRANULOCYTES # BLD AUTO: 0.01 K/UL (ref 0–0.11)
IMM GRANULOCYTES NFR BLD AUTO: 0.2 % (ref 0–0.9)
LDLC SERPL CALC-MCNC: 118 MG/DL
LYMPHOCYTES # BLD AUTO: 1.78 K/UL (ref 1–4.8)
LYMPHOCYTES NFR BLD: 36.9 % (ref 22–41)
MCH RBC QN AUTO: 29.2 PG (ref 27–33)
MCHC RBC AUTO-ENTMCNC: 34.4 G/DL (ref 33.6–35)
MCV RBC AUTO: 84.7 FL (ref 81.4–97.8)
MONOCYTES # BLD AUTO: 0.43 K/UL (ref 0–0.85)
MONOCYTES NFR BLD AUTO: 8.9 % (ref 0–13.4)
NEUTROPHILS # BLD AUTO: 2.35 K/UL (ref 2–7.15)
NEUTROPHILS NFR BLD: 48.6 % (ref 44–72)
NRBC # BLD AUTO: 0 K/UL
NRBC BLD-RTO: 0 /100 WBC
PLATELET # BLD AUTO: 310 K/UL (ref 164–446)
PMV BLD AUTO: 9.4 FL (ref 9–12.9)
POTASSIUM SERPL-SCNC: 4 MMOL/L (ref 3.6–5.5)
PROT SERPL-MCNC: 7.8 G/DL (ref 6–8.2)
RBC # BLD AUTO: 4.97 M/UL (ref 4.2–5.4)
SODIUM SERPL-SCNC: 141 MMOL/L (ref 135–145)
TRIGL SERPL-MCNC: 120 MG/DL (ref 0–149)
TSH SERPL DL<=0.005 MIU/L-ACNC: 1.36 UIU/ML (ref 0.38–5.33)
WBC # BLD AUTO: 4.8 K/UL (ref 4.8–10.8)

## 2022-12-01 PROCEDURE — 84443 ASSAY THYROID STIM HORMONE: CPT

## 2022-12-01 PROCEDURE — 86803 HEPATITIS C AB TEST: CPT

## 2022-12-01 PROCEDURE — 83036 HEMOGLOBIN GLYCOSYLATED A1C: CPT

## 2022-12-01 PROCEDURE — 80061 LIPID PANEL: CPT

## 2022-12-01 PROCEDURE — 36415 COLL VENOUS BLD VENIPUNCTURE: CPT

## 2022-12-01 PROCEDURE — 80053 COMPREHEN METABOLIC PANEL: CPT

## 2022-12-01 PROCEDURE — 85025 COMPLETE CBC W/AUTO DIFF WBC: CPT

## 2022-12-02 PROBLEM — R74.01 TRANSAMINITIS: Status: ACTIVE | Noted: 2022-12-02

## 2022-12-15 ENCOUNTER — OFFICE VISIT (OUTPATIENT)
Dept: MEDICAL GROUP | Facility: MEDICAL CENTER | Age: 32
End: 2022-12-15
Payer: COMMERCIAL

## 2022-12-15 VITALS
BODY MASS INDEX: 26.22 KG/M2 | DIASTOLIC BLOOD PRESSURE: 80 MMHG | TEMPERATURE: 97.9 F | HEIGHT: 64 IN | OXYGEN SATURATION: 100 % | WEIGHT: 153.6 LBS | HEART RATE: 61 BPM | SYSTOLIC BLOOD PRESSURE: 112 MMHG

## 2022-12-15 DIAGNOSIS — R73.09 ELEVATED GLUCOSE: ICD-10-CM

## 2022-12-15 DIAGNOSIS — R74.01 TRANSAMINITIS: ICD-10-CM

## 2022-12-15 DIAGNOSIS — Z00.00 ANNUAL PHYSICAL EXAM: ICD-10-CM

## 2022-12-15 PROCEDURE — 99395 PREV VISIT EST AGE 18-39: CPT | Performed by: PHYSICIAN ASSISTANT

## 2022-12-15 ASSESSMENT — FIBROSIS 4 INDEX: FIB4 SCORE: 0.86

## 2022-12-15 ASSESSMENT — PATIENT HEALTH QUESTIONNAIRE - PHQ9: CLINICAL INTERPRETATION OF PHQ2 SCORE: 0

## 2022-12-15 NOTE — ASSESSMENT & PLAN NOTE
This is a pleasant 32-year-old female here today for an annual physical.  Recently did her labs.  Glucose level at 115.  She fasted for 16 hours.  A1c at 5.4.  Liver enzymes were elevated.  AST slightly above 100.  ALT in the 80s.  She did take ibuprofen few days before her labs.  Denied and heavy dosing.  No alcohol consumption.  Prior enzymes were normal range.  She states her blood pressure is elevated when she works.  She has a tested from time to time.  Here today in the office of blood pressure is in normal range.

## 2022-12-15 NOTE — PROGRESS NOTES
"Subjective:   Shara AMADOR Am Is is a 32 y.o. female here today for annual physical.    Annual physical exam  This is a pleasant 32-year-old female here today for an annual physical.  Recently did her labs.  Glucose level at 115.  She fasted for 16 hours.  A1c at 5.4.  Liver enzymes were elevated.  AST slightly above 100.  ALT in the 80s.  She did take ibuprofen few days before her labs.  Denied and heavy dosing.  No alcohol consumption.  Prior enzymes were normal range.  She states her blood pressure is elevated when she works.  She has a tested from time to time.  Here today in the office of blood pressure is in normal range.       Current medicines (including changes today)  No current outpatient medications on file.     No current facility-administered medications for this visit.     She  has a past medical history of Head ache.    Social History and Family History were reviewed and updated.    ROS   No chest pain, no shortness of breath, no abdominal pain and all other systems were reviewed and are negative.       Objective:     /80 (BP Location: Right arm, Patient Position: Sitting, BP Cuff Size: Adult)   Pulse 61   Temp 36.6 °C (97.9 °F) (Temporal)   Ht 1.626 m (5' 4\")   Wt 69.7 kg (153 lb 9.6 oz)   SpO2 100%  Body mass index is 26.37 kg/m².   Physical Exam:  Constitutional: Alert, no distress.  Skin: Warm, dry, good turgor, no rashes in visible areas.  Eye: Equal, round and reactive, conjunctiva clear, lids normal.  ENMT: Lips without lesions, good dentition, oropharynx clear.  Neck: Trachea midline, no masses.   Lymph: No cervical or supraclavicular lymphadenopathy  Respiratory: Unlabored respiratory effort, lungs appear clear.  Psych: Alert and oriented x3, normal affect and mood.        Assessment and Plan:   The following treatment plan was discussed    1. Annual physical exam  Generally healthy female.  Reviewed labs.  Discussed possibility of her becoming pregnant.  Advised currently she is not " considered high risk.     2. Transaminitis  Reviewed labs that showed elevation of her enzymes.  We will check CMP and follow-up.  Performed in February.  Fast 8 hours.  - Comp Metabolic Panel; Future    3. Elevated glucose  Reviewed cholesterol profile.  Concerned about prediabetes.  A1c in normal range.  Repeat CMP in February.  Fast 8 hours.  - Comp Metabolic Panel; Future         Followup: Return in 6 months (on 6/15/2023), or if symptoms worsen or fail to improve.    Please note that this dictation was created using voice recognition software. I have made every reasonable attempt to correct obvious errors, but I expect that there are errors of grammar and possibly content that I did not discover before finalizing the note.

## 2023-03-01 ENCOUNTER — OFFICE VISIT (OUTPATIENT)
Dept: MEDICAL GROUP | Facility: MEDICAL CENTER | Age: 33
End: 2023-03-01
Payer: COMMERCIAL

## 2023-03-01 VITALS
HEIGHT: 64 IN | HEART RATE: 83 BPM | SYSTOLIC BLOOD PRESSURE: 120 MMHG | BODY MASS INDEX: 26.32 KG/M2 | OXYGEN SATURATION: 100 % | TEMPERATURE: 98.2 F | DIASTOLIC BLOOD PRESSURE: 80 MMHG | WEIGHT: 154.2 LBS

## 2023-03-01 DIAGNOSIS — R06.83 SNORING: ICD-10-CM

## 2023-03-01 DIAGNOSIS — R74.01 TRANSAMINITIS: ICD-10-CM

## 2023-03-01 DIAGNOSIS — R06.81 WITNESSED APNEIC SPELLS: ICD-10-CM

## 2023-03-01 DIAGNOSIS — M79.671 RIGHT FOOT PAIN: ICD-10-CM

## 2023-03-01 PROCEDURE — 99214 OFFICE O/P EST MOD 30 MIN: CPT | Performed by: PHYSICIAN ASSISTANT

## 2023-03-01 ASSESSMENT — FIBROSIS 4 INDEX: FIB4 SCORE: 0.86

## 2023-03-01 ASSESSMENT — PATIENT HEALTH QUESTIONNAIRE - PHQ9: CLINICAL INTERPRETATION OF PHQ2 SCORE: 0

## 2023-03-01 NOTE — ASSESSMENT & PLAN NOTE
This is a pleasant 32-year-old female here today to discuss sleep apnea.  Her boyfriend has noted that she sleeps very loud and occasionally he will have to arouse her because she stops breathing.  He wanted her to be seen today and get a referral to sleep medicine.  She has been having these issues for a couple years.  No known family history of sleep apnea.

## 2023-03-01 NOTE — ASSESSMENT & PLAN NOTE
Complains of right foot pain.  Pain is on the lateral aspect toward the heel.  Denies pain initially when she gets up from a seated position.  She has had the pain for about 2 weeks.  Denies trauma.  Worse when walking.  She is on her feet most of the day at work.

## 2023-03-01 NOTE — PROGRESS NOTES
"Subjective:   Shara Patricia is a 32 y.o. female here today for witnessed apneic spells with a history of snoring and right foot pain for 2 weeks.    Witnessed apneic spells  This is a pleasant 32-year-old female here today to discuss sleep apnea.  Her boyfriend has noted that she sleeps very loud and occasionally he will have to arouse her because she stops breathing.  He wanted her to be seen today and get a referral to sleep medicine.  She has been having these issues for a couple years.  No known family history of sleep apnea.    Right foot pain  Complains of right foot pain.  Pain is on the lateral aspect toward the heel.  Denies pain initially when she gets up from a seated position.  She has had the pain for about 2 weeks.  Denies trauma.  Worse when walking.  She is on her feet most of the day at work.       Current medicines (including changes today)  No current outpatient medications on file.     No current facility-administered medications for this visit.     She  has a past medical history of Head ache.    Social History and Family History were reviewed and updated.    ROS   No chest pain, no shortness of breath, no abdominal pain and all other systems were reviewed and are negative.       Objective:     /80 (BP Location: Right arm, Patient Position: Sitting, BP Cuff Size: Adult)   Pulse 83   Temp 36.8 °C (98.2 °F) (Temporal)   Ht 1.626 m (5' 4\")   Wt 69.9 kg (154 lb 3.2 oz)   SpO2 100%  Body mass index is 26.47 kg/m².   Physical Exam:  Constitutional: Alert, no distress.  Skin: Warm, dry, good turgor, no rashes in visible areas.  Eye: Equal, round and reactive, conjunctiva clear, lids normal.  ENMT: Lips without lesions, good dentition, oropharynx clear.  Neck: Trachea midline, no masses.   Lymph: No cervical or supraclavicular lymphadenopathy  Respiratory: Unlabored respiratory effort, lungs appear clear.  Psych: Alert and oriented x3, normal affect and mood.        Assessment and Plan: "   The following treatment plan was discussed    1. Witnessed apneic spells  New condition noted in chart but chronic.  Refer to sleep medicine for evaluation.  - Referral to Pulmonary and Sleep Medicine    2. Snoring  Chronic condition.  Refer to sleep medicine for evaluation.  Discussed possibility of seeing ENT in the future as well.  She has enlarged tonsils.  - Referral to Pulmonary and Sleep Medicine    3. Right foot pain  Acute, new onset condition.  Symptoms appear to be a strain.  No heel point tenderness.  Atypical for plantar fasciitis.  Discussed though same types of measures taking ibuprofen, ice and wearing shoes with good support.  Contact me if symptoms are not improving to follow-up with orthopedics or podiatry.    4. Transaminitis  Advised her to perform the labs that I ordered based upon her last CMP.  Order was placed.         Followup: Return if symptoms worsen or fail to improve.    Please note that this dictation was created using voice recognition software. I have made every reasonable attempt to correct obvious errors, but I expect that there are errors of grammar and possibly content that I did not discover before finalizing the note.

## 2023-03-10 ENCOUNTER — TELEPHONE (OUTPATIENT)
Dept: HEALTH INFORMATION MANAGEMENT | Facility: OTHER | Age: 33
End: 2023-03-10
Payer: COMMERCIAL

## 2023-07-05 ENCOUNTER — OFFICE VISIT (OUTPATIENT)
Dept: URGENT CARE | Facility: PHYSICIAN GROUP | Age: 33
End: 2023-07-05
Payer: COMMERCIAL

## 2023-07-05 VITALS
HEART RATE: 80 BPM | HEIGHT: 64 IN | DIASTOLIC BLOOD PRESSURE: 98 MMHG | SYSTOLIC BLOOD PRESSURE: 142 MMHG | RESPIRATION RATE: 12 BRPM | BODY MASS INDEX: 26.63 KG/M2 | WEIGHT: 156 LBS | TEMPERATURE: 98.1 F | OXYGEN SATURATION: 98 %

## 2023-07-05 DIAGNOSIS — S80.869A INSECT BITE OF LOWER LEG, UNSPECIFIED LATERALITY, INITIAL ENCOUNTER: ICD-10-CM
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
## 2023-07-05 PROCEDURE — 99213 OFFICE O/P EST LOW 20 MIN: CPT | Performed by: FAMILY MEDICINE

## 2023-07-05 PROCEDURE — 3080F DIAST BP >= 90 MM HG: CPT | Performed by: FAMILY MEDICINE

## 2023-07-05 PROCEDURE — 3077F SYST BP >= 140 MM HG: CPT | Performed by: FAMILY MEDICINE

## 2023-07-05 RX ORDER — ESTAZOLAM 2 MG/1
1 TABLET ORAL DAILY
COMMUNITY
Start: 2023-06-18 | End: 2023-09-14

## 2023-07-05 RX ORDER — CEPHALEXIN 500 MG/1
500 CAPSULE ORAL 4 TIMES DAILY
Qty: 20 CAPSULE | Refills: 0 | Status: SHIPPED | OUTPATIENT
Start: 2023-07-05 | End: 2023-07-10

## 2023-07-05 RX ORDER — TRIAMCINOLONE ACETONIDE 1 MG/G
OINTMENT TOPICAL
Qty: 30 G | Refills: 0 | Status: ON HOLD | OUTPATIENT
Start: 2023-07-05 | End: 2024-03-09

## 2023-07-05 ASSESSMENT — FIBROSIS 4 INDEX: FIB4 SCORE: 0.88

## 2023-07-05 NOTE — LETTER
July 5, 2023         Patient: Shara Patricia   YOB: 1990   Date of Visit: 7/5/2023           To Whom it May Concern:    Shara Patricia was seen in my clinic on 7/5/2023. Please excuse from work 7/6 and 7/7/2023.      Sincerely,           Chin Evans M.D.  Electronically Signed

## 2023-07-06 ENCOUNTER — APPOINTMENT (OUTPATIENT)
Dept: MEDICAL GROUP | Facility: MEDICAL CENTER | Age: 33
End: 2023-07-06
Payer: COMMERCIAL

## 2023-07-06 ENCOUNTER — OFFICE VISIT (OUTPATIENT)
Dept: MEDICAL GROUP | Facility: MEDICAL CENTER | Age: 33
End: 2023-07-06
Payer: COMMERCIAL

## 2023-07-06 VITALS
TEMPERATURE: 97.6 F | DIASTOLIC BLOOD PRESSURE: 82 MMHG | SYSTOLIC BLOOD PRESSURE: 130 MMHG | HEIGHT: 64 IN | HEART RATE: 97 BPM | RESPIRATION RATE: 16 BRPM | BODY MASS INDEX: 26.63 KG/M2 | OXYGEN SATURATION: 99 % | WEIGHT: 156 LBS

## 2023-07-06 DIAGNOSIS — Z79.899 HIGH RISK MEDICATION USE: ICD-10-CM

## 2023-07-06 DIAGNOSIS — L03.119 CELLULITIS OF LOWER EXTREMITY, UNSPECIFIED LATERALITY: ICD-10-CM

## 2023-07-06 DIAGNOSIS — R60.0 BILATERAL LOWER EXTREMITY EDEMA: ICD-10-CM

## 2023-07-06 PROBLEM — L03.90 CELLULITIS: Status: ACTIVE | Noted: 2023-07-06

## 2023-07-06 PROCEDURE — 3075F SYST BP GE 130 - 139MM HG: CPT | Performed by: INTERNAL MEDICINE

## 2023-07-06 PROCEDURE — 3079F DIAST BP 80-89 MM HG: CPT | Performed by: INTERNAL MEDICINE

## 2023-07-06 PROCEDURE — 99214 OFFICE O/P EST MOD 30 MIN: CPT | Performed by: INTERNAL MEDICINE

## 2023-07-06 ASSESSMENT — FIBROSIS 4 INDEX: FIB4 SCORE: 0.88

## 2023-07-06 NOTE — PROGRESS NOTES
"Subjective     Mikala M Am Is is a 33 y.o. female who presents with Leg Pain (Bites in both legs, swelling and painful )            Onset yesterday redness and swelling associated with suspected mosquito bites. No drainage. No red streaking. No fever. No relief with OTC medications. No other aggravating or alleviating factors.          Review of Systems   Constitutional:  Negative for malaise/fatigue and weight loss.   Eyes:  Negative for discharge and redness.   Gastrointestinal:  Negative for nausea and vomiting.   Musculoskeletal:  Negative for joint pain and myalgias.              Objective     BP (!) 142/98   Pulse 80   Temp 36.7 °C (98.1 °F)   Resp 12   Ht 1.626 m (5' 4\")   Wt 70.8 kg (156 lb)   SpO2 98%   BMI 26.78 kg/m²      Physical Exam  Constitutional:       Appearance: She is obese.   Musculoskeletal:      Right lower leg: No edema.      Left lower leg: No edema.   Skin:     General: Skin is warm and dry.      Comments: Bilateral LE redness and swelling. Central puncture c/w bite. No lymphangitis. No proximal lymphadenopathy.    Neurological:      Mental Status: She is alert.                             Assessment & Plan        1. Insect bite of lower leg, unspecified laterality, initial encounter  cephALEXin (KEFLEX) 500 MG Cap    triamcinolone acetonide (KENALOG) 0.1 % Ointment        Differential diagnosis, natural history, supportive care, and indications for immediate follow-up were discussed.     Suspect local reaction from bite. Cannot r/o infection.    Contingent antibiotic prescription given to patient to fill upon meeting criteria of guidelines discussed.                   "

## 2023-07-06 NOTE — ASSESSMENT & PLAN NOTE
Is an acute problem, symptoms appear to be distant/progressing.  Less than 24 hours since antibiotic initiation.    - Continue Keflex as prescribed, allow 48 hours for symptoms to improve   - Return to care if symptoms worsen or fail to improve as participated    - Strict ER precautions

## 2023-07-06 NOTE — PROGRESS NOTES
"Subjective:     CC:   Chief Complaint   Patient presents with    Leg Swelling     Both legs      Diagnoses of Cellulitis of lower extremity, unspecified laterality, Bilateral lower extremity edema, and High risk medication use were pertinent to this visit.    HPI: Shara is a pleasant 33 y.o. female who presents today for evaluation of the problems listed below:    Problem   Cellulitis    This is an acute problem, symptoms started on 7/4/2023 after patient has been outdoors watching fireworks. She had a few insect bites on her bilateral shins, which became tender and painful  She has also noticed bilateral lower extremity edema.  Patient  was evaluated in urgent care yesterday and was prescribed a course of cephalexin and topical steroid cream.  She has noticed that today her swelling has been progressively getting worse, she started experiencing tingling/numbness in bilateral lower extremities.  Patient denies fever, chills, chest pain, shortness of breath.         High Risk Medication Use    OCP     Bilateral Lower Extremity Edema    Most likely secondary to cellulitis, however patient is also on OCP for contraception, increased risk of DVT.         Past Medical History:   Diagnosis Date    Head ache      Current Outpatient Medications Ordered in Epic   Medication Sig Dispense Refill    MICROGESTIN 1-20 MG-MCG per tablet Take 1 Tablet by mouth every day.      cephALEXin (KEFLEX) 500 MG Cap Take 1 Capsule by mouth 4 times a day for 5 days. 20 Capsule 0    triamcinolone acetonide (KENALOG) 0.1 % Ointment Apply thin layer to affected area twice daily as needed 30 g 0     No current Epic-ordered facility-administered medications on file.     ROS  Negative except as above    Objective:     Exam:  /82   Pulse 97   Temp 36.4 °C (97.6 °F) (Temporal)   Resp 16   Ht 1.626 m (5' 4\")   Wt 70.8 kg (156 lb)   SpO2 99%   BMI 26.78 kg/m²  Body mass index is 26.78 kg/m².    Physical Exam  Constitutional:       " General: She is not in acute distress.     Appearance: Normal appearance. She is not toxic-appearing.   Musculoskeletal:        Legs:       Comments: Bilateral lower extremity pitting edema and erythema.     Neurological:      Mental Status: She is alert.       Assessment & Plan:   Shara  is a pleasant 33 y.o. female with the following -     Problem List Items Addressed This Visit       Bilateral lower extremity edema     Elevated risk of DVT due to birth control and immobilization for 24 hours, obtain lower extremity duplex bilateral STAT.    -  Stop OCP and use alternative contraception         Relevant Orders    US-EXTREMITY VENOUS LOWER BILAT    Cellulitis     Is an acute problem, symptoms appear to be distant/progressing.  Less than 24 hours since antibiotic initiation.    - Continue Keflex as prescribed, allow 48 hours for symptoms to improve   - Return to care if symptoms worsen or fail to improve as participated    - Strict ER precautions                High risk medication use     Return if symptoms worsen or fail to improve.    Please note that this dictation was created using voice recognition software. I have made every reasonable attempt to correct obvious errors, but I expect that there are errors of grammar and possibly content that I did not discover before finalizing the note.

## 2023-07-06 NOTE — ASSESSMENT & PLAN NOTE
Elevated risk of DVT due to birth control and immobilization for 24 hours, obtain lower extremity duplex bilateral STAT.    -  Stop OCP and use alternative contraception

## 2023-07-07 ENCOUNTER — APPOINTMENT (OUTPATIENT)
Dept: RADIOLOGY | Facility: MEDICAL CENTER | Age: 33
End: 2023-07-07
Attending: INTERNAL MEDICINE
Payer: COMMERCIAL

## 2023-07-11 ASSESSMENT — ENCOUNTER SYMPTOMS
VOMITING: 0
EYE REDNESS: 0
NAUSEA: 0
EYE DISCHARGE: 0
MYALGIAS: 0
WEIGHT LOSS: 0

## 2023-07-14 ENCOUNTER — OFFICE VISIT (OUTPATIENT)
Dept: SLEEP MEDICINE | Facility: MEDICAL CENTER | Age: 33
End: 2023-07-14
Attending: PHYSICIAN ASSISTANT
Payer: COMMERCIAL

## 2023-07-14 VITALS
SYSTOLIC BLOOD PRESSURE: 140 MMHG | OXYGEN SATURATION: 96 % | RESPIRATION RATE: 16 BRPM | DIASTOLIC BLOOD PRESSURE: 90 MMHG | WEIGHT: 152 LBS | HEIGHT: 64 IN | HEART RATE: 104 BPM | BODY MASS INDEX: 25.95 KG/M2

## 2023-07-14 DIAGNOSIS — R06.83 SNORING: ICD-10-CM

## 2023-07-14 DIAGNOSIS — R06.81 WITNESSED APNEIC SPELLS: ICD-10-CM

## 2023-07-14 DIAGNOSIS — R53.82 CHRONIC FATIGUE, UNSPECIFIED: ICD-10-CM

## 2023-07-14 PROCEDURE — 3080F DIAST BP >= 90 MM HG: CPT | Performed by: PREVENTIVE MEDICINE

## 2023-07-14 PROCEDURE — 3077F SYST BP >= 140 MM HG: CPT | Performed by: PREVENTIVE MEDICINE

## 2023-07-14 PROCEDURE — 99204 OFFICE O/P NEW MOD 45 MIN: CPT | Performed by: PREVENTIVE MEDICINE

## 2023-07-14 PROCEDURE — 99212 OFFICE O/P EST SF 10 MIN: CPT | Performed by: PREVENTIVE MEDICINE

## 2023-07-14 ASSESSMENT — FIBROSIS 4 INDEX: FIB4 SCORE: 0.88

## 2023-07-14 NOTE — PROGRESS NOTES
"CHIEF COMPLIANT: \"Establish care.\"  HISTORY OF PRESENT ILLNESS:  Shara Patricia is a 33 y.o. female kindly referred by Alvin Mcfarlane P.A.-C. and  is here for a sleep medicine consultation.     Sleep History:  Shara Patricia has never been tested for sleep apnea. The patient reports snoring, witnessed apneas  and fragmented sleep.  The patient goes to bed at 10 pm and wakes up at 6 am. It usually takes the patient approximately hours to fall asleep . Watches TV in bed approx 2 hours before sleep.The patient usually feels refreshed  Pt does not use cannabis.  This pt does  not drink alcoholic beverages and has 1 caffeinated beverages weekly.     The patient denies any symptoms of narcolepsy such as sleep paralysis or cataplexy, or any symptoms that suggest parasomnias such as sleep walking or acting out of dreams.    Shara Patricia is a  @  [employer]     Endorses family members who use PAP therapy/snore:   Father     EPWORTH SCORE:     5 /24, this is   consistent with EDS      Significant comorbidities and modifying factors: see below    PAST MEDICAL HISTORY:  Past Medical History:   Diagnosis Date    Head ache       PROBLEM LIST:  Patient Active Problem List    Diagnosis Date Noted    Cellulitis 07/06/2023    High risk medication use 07/06/2023    Bilateral lower extremity edema 07/06/2023    Right foot pain 03/01/2023    Snoring 03/01/2023    Witnessed apneic spells 03/01/2023    Transaminitis 12/02/2022    Essential hypertension 10/13/2021    PCOS (polycystic ovarian syndrome) 04/16/2021    Low vitamin D level 10/27/2018    Encounter for surveillance of contraceptive pills 11/22/2017    Family history of diabetes mellitus type II 11/22/2017    Annual physical exam 04/13/2017     PAST SOCIAL HISTORY:  Past Surgical History:   Procedure Laterality Date    PRIMARY C SECTION  3/13/2012    Performed by JOEL JONES at [facility]     PAST FAMILY HISTORY:  Family History " "  Problem Relation Age of Onset    Diabetes Father         Insulin dependent    Hypertension Sister     Diabetes Maternal Grandmother 50        insulin dependent    Cancer Neg Hx     Heart Disease Neg Hx     Stroke Neg Hx      SOCIAL HISTORY:  Social History     Socioeconomic History    Marital status: Single     Spouse name: Not on file    Number of children: Not on file    Years of education: Not on file    Highest education level: Not on file   Occupational History    Not on file   Tobacco Use    Smoking status: Never    Smokeless tobacco: Never   Vaping Use    Vaping Use: Never used   Substance and Sexual Activity    Alcohol use: Yes     Comment: 3 drinks every few months    Drug use: No    Sexual activity: Yes     Partners: Male     Birth control/protection: Pill     Comment: 9-year old   Other Topics Concern    Not on file   Social History Narrative    Not on file     Social Determinants of Health     Financial Resource Strain: Not on file   Food Insecurity: Not on file   Transportation Needs: Not on file   Physical Activity: Not on file   Stress: Not on file   Social Connections: Not on file   Intimate Partner Violence: Not on file   Housing Stability: Not on file     ALLERGIES: Crab  MEDICATIONS:  Current Outpatient Medications   Medication Sig Dispense Refill    MICROGESTIN 1-20 MG-MCG per tablet Take 1 Tablet by mouth every day.      triamcinolone acetonide (KENALOG) 0.1 % Ointment Apply thin layer to affected area twice daily as needed (Patient not taking: Reported on 7/14/2023) 30 g 0     No current facility-administered medications for this visit.    \"CURRENT RX\"    REVIEW OF SYSTEMS:  Constitutional: Denies weight loss, endorses chronic daytime fatigue --also see HPI    PHYSICAL EXAM/VITALS:  BP (!) 140/90 (BP Location: Left arm, Patient Position: Sitting, BP Cuff Size: Large adult)   Pulse (!) 104   Resp 16   Ht 1.626 m (5' 4\")   Wt 68.9 kg (152 lb)   SpO2 96%   BMI 26.09 kg/m²   Neck " circumference (inches): 15  Appearance: Well-nourished, well-developed,  looks stated age, no acute distress  Eyes:   EOMI  ENMT: Mallampati:3    Neck: Supple, trachea midline  Respiratory effort:  No intercostal retractions or use of accessory muscles  Lung auscultation:  No wheezes rhonchi rubs or rales  Cardiac: No murmurs, rubs, or gallops; regular rhythm, normal rate; no edema  Musculoskeletal:  Grossly normal; gait and station normal  Neurologic:  oriented to person, time, place, and purpose; judgement intact  Psychiatric:  No depression, anxiety, agitation    MEDICAL DECISION MAKING:  The medical record was reviewed as it pertains to this referral. This includes records from primary care,consultants notes, referral request, hospital records, labs and imaging. Any available diagnostic and titration nocturnal polysomnograms, home sleep apnea tests, continuous nocturnal oximetry results, multiple sleep latency tests, and recent compliance reports were reviewed with the patient.    ASSESSMENT/PLAN:  Shara Patricia is a 33 y.o. female  who has a high pretest probability of having obstructive sleep apnea. HSTs often underestimate sleep apnea in women. A negative HST should be followed by a laboratory sleep study (polysomnography) if the clinical suspicion for sleep apnea is high. In women, the common occurrence of insomnia and MARANDA is frequently reported.    DIAGNOSES :    1. Snoring  - Overnight Home Sleep Study; Future    2. Witnessed apneic spells  - Overnight Home Sleep Study; Future    3. Chronic fatigue, unspecified  - Overnight Home Sleep Study; Future    The patient has signs and symptoms consistent with obstructive sleep apnea hypopnea syndrome. Will schedule a nocturnal polysomnogram or a home sleep apnea test (please see plan).  The risks of untreated sleep apnea were discussed with the patient at length. Patients with MARANDA are at increased risk of cardiovascular disease including coronary artery disease,  systemic arterial hypertension, pulmonary arterial hypertension, cardiac arrhythmias, and stroke. MARANDA patients have an increased risk of motor vehicle accidents, type 2 diabetes, chronic kidney disease, and non-alcoholic liver disease. The patient was advised to avoid driving a motor vehicle when drowsy.  Have advised the patient to follow up with the appropriate healthcare practitioners for all other medical problems and issues.    RETURN TO CLINIC: Return for 3 weeks after SS - discuss results w/ any provider.    My total time spent caring for the patient on the day of the encounter was 40 minutes. This includes time spent on a thorough chart review including other physician notes, all sleep studies, as well as critical labs and pulmonary and cardiac studies.  Additionally, it includes a thorough discussion of good sleep hygiene and stimulus control, as well as  the need for consistency in terms of sleep preparation and practice.    Please note that this dictation was created using voice recognition software.  I have made every reasonable attempt to correct obvious errors, I expect that there are errors of grammar and possibly content that I did not discover before finalizing this note.

## 2023-08-03 ENCOUNTER — HOME STUDY (OUTPATIENT)
Dept: SLEEP MEDICINE | Facility: MEDICAL CENTER | Age: 33
End: 2023-08-03
Attending: PREVENTIVE MEDICINE
Payer: COMMERCIAL

## 2023-08-03 DIAGNOSIS — R06.83 SNORING: ICD-10-CM

## 2023-08-03 DIAGNOSIS — R06.81 WITNESSED APNEIC SPELLS: ICD-10-CM

## 2023-08-03 DIAGNOSIS — R53.82 CHRONIC FATIGUE, UNSPECIFIED: ICD-10-CM

## 2023-08-03 PROCEDURE — 95800 SLP STDY UNATTENDED: CPT | Performed by: PREVENTIVE MEDICINE

## 2023-08-20 NOTE — PROCEDURES
DIAGNOSTIC HOME SLEEP TEST (HST) REPORT WatchPAT      PATIENT ID:  NAME:  Shara Lyle Is  MRN:               [id number]  YOB: 1990  DATE OF STUDY:08/03/2023       Impression:     This study shows evidence of:      1. Mild obstructive sleep apnea with PAT apnea hyponea index (pAHI 3%) of 11.4 per hour.  PAT respiratory disturbance index (pRDI) was 18.6 per hour. These findings are based on 7 channels recording of PAT signal with sleep staging, heart rate, pulse oximetry, actigraphy, body position, snoring and respiratory movement.     2. Oxygenation O2 Sat. mean O2 sat was 96%,  alexandro was 86%,  and maximum O2 at 99 %. O2 sat was at or  below 88% for 0.0 min of evaluation time. Oxygen Desaturation (4%) Index was elevated at 1.9/hr.  PAT apnea hyponea index (pAHI 4%) of 2.6 per hour.  AVG HR was 78 BPM.      TECHNICAL DESCRIPTION: Patient underwent home sleep apnea testing with peripheral arterial tone signal (WatchPAT™). This is a Type IV portable monitor and device. Monitoring was done with 7 channels recording of PAT signal with sleep staging, heart rate, pulse oximetry, actigraphy, body position, snoring and respiratory movement. Prior to using the device, the patient received verbal and written instructions for its application and was provided with the help desk phone number for additional telephonic instruction with 24-hour availability of qualified personnel to answer questions.    AHI  vs RDI:  The pRDI is calculated in a very similar way as the pAHI but an additional type of respiratory events named RERA are also counted. RERA  is the abbreviation for respiratory effort related arousal and is essentially a very short arousal of a few seconds that follows partial occlusion of the airway.  The normal range of the RDI score is also less than 5/hour.    General sleep summary: . Total recording time is 9 hours and 28 minutes and approximate sleep time is 7 hours and 49 minutes. The patient  spent 213.4 minutes in the supine position and 256.5 minutes in the nonsupine position.  Supine AHI (3%) 16.7.    Recommendations:    OAT vs Auto CPAP trial.  Positional sleep device is recommended.      Patient will do well on a ResMed APAP with initial settings from min 5 to max 14 cm H20. Careful mask fit and heated humidification are required part of this prescription. This patient will need to meet all insurance compliance requirements.     In general patients with sleep apnea are advised to avoid alcohol and sedatives and to not operate a motor vehicle while drowsy. In some cases alternative treatment options may prove effective in resolving sleep apnea in these options include upper airway surgery, the use of a dental orthotic or weight loss and positional therapy. Clinical correlation is required.

## 2023-09-11 ENCOUNTER — TELEPHONE (OUTPATIENT)
Dept: SLEEP MEDICINE | Facility: MEDICAL CENTER | Age: 33
End: 2023-09-11
Payer: COMMERCIAL

## 2023-09-12 ENCOUNTER — TELEMEDICINE (OUTPATIENT)
Dept: SLEEP MEDICINE | Facility: MEDICAL CENTER | Age: 33
End: 2023-09-12
Attending: PHYSICIAN ASSISTANT
Payer: COMMERCIAL

## 2023-09-12 VITALS — WEIGHT: 158 LBS | HEIGHT: 64 IN | BODY MASS INDEX: 26.98 KG/M2

## 2023-09-12 DIAGNOSIS — G47.33 OSA (OBSTRUCTIVE SLEEP APNEA): ICD-10-CM

## 2023-09-12 PROCEDURE — 99213 OFFICE O/P EST LOW 20 MIN: CPT | Mod: 95 | Performed by: PHYSICIAN ASSISTANT

## 2023-09-12 ASSESSMENT — ENCOUNTER SYMPTOMS
DIZZINESS: 0
HEARTBURN: 0
HEADACHES: 0
FEVER: 0
PALPITATIONS: 0
CHILLS: 0
SORE THROAT: 0
TREMORS: 0
COUGH: 0
SPUTUM PRODUCTION: 0
INSOMNIA: 0
WEIGHT LOSS: 0
WHEEZING: 0
SINUS PAIN: 0
SHORTNESS OF BREATH: 0
ORTHOPNEA: 0

## 2023-09-12 ASSESSMENT — FIBROSIS 4 INDEX: FIB4 SCORE: 0.88

## 2023-09-12 NOTE — PATIENT INSTRUCTIONS
1-reviewed sleep study results  2-proceed with auto cpap  [facility]  4-reviewed strategies for success   Minimum use 4 hours/night and 70% of days   Therapeutic is 6-6.5 hours per night and most days   Training period  min in evening for 3-4 days,  transition to night time  5-3 months in person appointment   6-bring entire device to clinic

## 2023-09-12 NOTE — TELEPHONE ENCOUNTER
09-11-23  1st NO SHOW  Date of No Show: 09-08-23  Provider: DONELL Correa  Reason For Visit: FV/ SS results  Outcome of call:      Appt rescheduled with patient     Reason Missed: Had allergic reaction to something - was busy with that  ACM

## 2023-09-12 NOTE — PROGRESS NOTES
"Virtual Visit: Established Patient   This visit was conducted via Zoom using secure and encrypted videoconferencing technology.   The patient was in a private location outside of their home in the state of [place].    The patient's identity was confirmed and verbal consent was obtained for this virtual visit.     Subjective:     Chief Complaint   Patient presents with    Follow-Up     SS       HPI:  Shara Patricia is a 33 y.o. year old female here today for follow-up on sleep study results.  Patient previously evaluated in clinic by Dr. Flor Tapia 7/14/2023.    Past Medical History: PCOS, essential hypertension, bilateral lower extremity edema, cellulitis.  Patient works as a  [employer].    Vitals:  Ht 1.626 m (5' 4\")   Wt 71.7 kg (158 lb)   BMI 27.12 kg/m²     Recent Imaging: None      Home sleep study obtained 8/3/2023 demonstrated mild MARANDA with pAHI of 11.4 an hour with pRDI of 18.6/hr. low O2 sat of 86% with sats less than or equal to 88% for less than one min of total sleep time.  Patient aware home sleep test especially for women may underestimate frequency of apnea hypopnea episodes.  Reviewed options of oral mandibular advancement device versus auto CPAP trial.  Patient would like to proceed with auto CPAP trial at this time.    Sleep schedule goes to bed around 10 PM, wakens 6 AM , and does not usually get up during the night but does take up to 2 hours to fall asleep.  She does report finding sleep refreshing.  Symptoms experiences daytime somnolence  and but does not nap during the day.    [place] Sleepiness Scale reported as 5/24 on 7/14/2023.    Mallampati score: III on 7/14/2023        Review of Systems   Constitutional:  Negative for chills, fever, malaise/fatigue and weight loss.   HENT:  Negative for congestion, hearing loss, nosebleeds, sinus pain, sore throat and tinnitus.    Eyes:         Presc glasses   Respiratory:  Negative for cough, sputum production, " shortness of breath and wheezing.    Cardiovascular:  Negative for chest pain, palpitations, orthopnea and leg swelling.   Gastrointestinal:  Negative for heartburn.        Partial lower, no swallowing issues   Neurological:  Negative for dizziness, tremors and headaches.   Psychiatric/Behavioral:  The patient does not have insomnia.        Past Medical History:   Diagnosis Date    Head ache        Past Surgical History:   Procedure Laterality Date    PRIMARY C SECTION  3/13/2012    Performed by JOEL JONES at [facility]       Family History   Problem Relation Age of Onset    Diabetes Father         Insulin dependent    Hypertension Sister     Diabetes Maternal Grandmother 50        insulin dependent    Cancer Neg Hx     Heart Disease Neg Hx     Stroke Neg Hx        Social History     Socioeconomic History    Marital status: Single     Spouse name: Not on file    Number of children: Not on file    Years of education: Not on file    Highest education level: Not on file   Occupational History    Not on file   Tobacco Use    Smoking status: Never    Smokeless tobacco: Never   Vaping Use    Vaping Use: Never used   Substance and Sexual Activity    Alcohol use: Yes     Comment: 3 drinks every few months    Drug use: No    Sexual activity: Yes     Partners: Male     Birth control/protection: Pill     Comment: 9-year old   Other Topics Concern    Not on file   Social History Narrative    Not on file     Social Determinants of Health     Financial Resource Strain: Not on file   Food Insecurity: Not on file   Transportation Needs: Not on file   Physical Activity: Not on file   Stress: Not on file   Social Connections: Not on file   Intimate Partner Violence: Not on file   Housing Stability: Not on file       Allergies as of 09/12/2023 - Reviewed 07/14/2023   Allergen Reaction Noted    Crab  11/08/2018          Current medications as of today   Current Outpatient Medications   Medication Sig Dispense Refill     triamcinolone acetonide (KENALOG) 0.1 % Ointment Apply thin layer to affected area twice daily as needed 30 g 0    MICROGESTIN 1-20 MG-MCG per tablet Take 1 Tablet by mouth every day.       No current facility-administered medications for this visit.          Objective:   Physical Exam:  Constitutional: Alert, no distress, well-groomed.  Skin: No rashes in visible areas.  Eye: Round. Conjunctiva clear, lids normal. No icterus.   ENMT: Lips pink without lesions, good dentition, moist mucous membranes. Phonation normal.  Neck: No masses, no thyromegaly. Moves freely without pain.  Respiratory: Unlabored respiratory effort, no cough or audible wheeze  Psych: Alert and oriented x3, normal affect and mood.     Assessment and Plan:   The following treatment plan was discussed:     1. MARANDA (obstructive sleep apnea)    Reviewed sleep study results, reviewed risks associated with untreated or undertreated sleep apnea including heart disease, stroke, MVA's.  Patient does have partial plate as well as previous braces and current retainer would not be a good candidate for mandibular device.  Proceed with auto CPAP trial.  We will [facility] orders to [facility] per patient request.  Reviewed strategies for success including minimum usage requirements, therapeutic goals, training period of 30 minutes to 120 minutes in the evening for 3 to 4 days before transitioning to nighttime use.  Patient will need short-term follow-up appointment with 30 to 60 days of usage data to review.  Bring entire device to clinic for that appointment.    Follow-up:   Return in about 3 months (around 12/12/2023) for Return with Pamela Man PA-C.

## 2023-09-22 ENCOUNTER — HOSPITAL ENCOUNTER (OUTPATIENT)
Dept: LAB | Facility: MEDICAL CENTER | Age: 33
End: 2023-09-22
Attending: SPECIALIST
Payer: COMMERCIAL

## 2023-09-22 DIAGNOSIS — Z3A.11 11 WEEKS GESTATION OF PREGNANCY: ICD-10-CM

## 2023-09-22 LAB — HIV 1+2 AB+HIV1 P24 AG SERPL QL IA: NORMAL

## 2023-09-22 PROCEDURE — 36415 COLL VENOUS BLD VENIPUNCTURE: CPT

## 2023-09-22 PROCEDURE — 87389 HIV-1 AG W/HIV-1&-2 AB AG IA: CPT

## 2023-09-27 ENCOUNTER — HOSPITAL ENCOUNTER (OUTPATIENT)
Dept: LAB | Facility: MEDICAL CENTER | Age: 33
End: 2023-09-27
Attending: FAMILY MEDICINE
Payer: COMMERCIAL

## 2023-09-27 LAB
ABO GROUP BLD: NORMAL
BASOPHILS # BLD AUTO: 0.2 % (ref 0–1.8)
BASOPHILS # BLD: 0.02 K/UL (ref 0–0.12)
BLD GP AB SCN SERPL QL: NORMAL
EOSINOPHIL # BLD AUTO: 0.25 K/UL (ref 0–0.51)
EOSINOPHIL NFR BLD: 2.5 % (ref 0–6.9)
ERYTHROCYTE [DISTWIDTH] IN BLOOD BY AUTOMATED COUNT: 39.8 FL (ref 35.9–50)
HBV SURFACE AG SER QL: NORMAL
HCT VFR BLD AUTO: 38.6 % (ref 37–47)
HGB BLD-MCNC: 13.5 G/DL (ref 12–16)
IMM GRANULOCYTES # BLD AUTO: 0.05 K/UL (ref 0–0.11)
IMM GRANULOCYTES NFR BLD AUTO: 0.5 % (ref 0–0.9)
LYMPHOCYTES # BLD AUTO: 2.33 K/UL (ref 1–4.8)
LYMPHOCYTES NFR BLD: 22.8 % (ref 22–41)
MCH RBC QN AUTO: 30.3 PG (ref 27–33)
MCHC RBC AUTO-ENTMCNC: 35 G/DL (ref 32.2–35.5)
MCV RBC AUTO: 86.5 FL (ref 81.4–97.8)
MONOCYTES # BLD AUTO: 0.68 K/UL (ref 0–0.85)
MONOCYTES NFR BLD AUTO: 6.7 % (ref 0–13.4)
NEUTROPHILS # BLD AUTO: 6.87 K/UL (ref 1.82–7.42)
NEUTROPHILS NFR BLD: 67.3 % (ref 44–72)
NRBC # BLD AUTO: 0 K/UL
NRBC BLD-RTO: 0 /100 WBC (ref 0–0.2)
PLATELET # BLD AUTO: 295 K/UL (ref 164–446)
PMV BLD AUTO: 10.3 FL (ref 9–12.9)
RBC # BLD AUTO: 4.46 M/UL (ref 4.2–5.4)
RH BLD: NORMAL
RUBV AB SER QL: 138 IU/ML
T PALLIDUM AB SER QL IA: NORMAL
WBC # BLD AUTO: 10.2 K/UL (ref 4.8–10.8)

## 2023-09-27 PROCEDURE — 85025 COMPLETE CBC W/AUTO DIFF WBC: CPT

## 2023-09-27 PROCEDURE — 86900 BLOOD TYPING SEROLOGIC ABO: CPT

## 2023-09-27 PROCEDURE — 86592 SYPHILIS TEST NON-TREP QUAL: CPT

## 2023-09-27 PROCEDURE — 86901 BLOOD TYPING SEROLOGIC RH(D): CPT

## 2023-09-27 PROCEDURE — 86780 TREPONEMA PALLIDUM: CPT

## 2023-09-27 PROCEDURE — 86762 RUBELLA ANTIBODY: CPT

## 2023-09-27 PROCEDURE — 87340 HEPATITIS B SURFACE AG IA: CPT

## 2023-09-27 PROCEDURE — 36415 COLL VENOUS BLD VENIPUNCTURE: CPT

## 2023-09-27 PROCEDURE — 86850 RBC ANTIBODY SCREEN: CPT

## 2023-09-28 ENCOUNTER — TELEPHONE (OUTPATIENT)
Dept: MEDICAL GROUP | Facility: MEDICAL CENTER | Age: 33
End: 2023-09-28
Payer: COMMERCIAL

## 2023-09-28 NOTE — TELEPHONE ENCOUNTER
----- Message from Burke Parnell M.D. sent at 9/25/2023  5:21 PM PDT -----  I ordered other 2 test including obstetric panel and NIPT test.  Please call for [facility] to check why they canceled these orders. NIPT test can be done between 10 to 14 weeks of pregnancy and by the time patient sees OB, she will be at the end of 14 weeks and she will not be able to do this test.  Please let me know if they need different test order or patient needs to do this again.

## 2023-09-28 NOTE — TELEPHONE ENCOUNTER
Phone Number Called: [phone]    Call outcome: Spoke to patient regarding message below.    Message: Pt was contacted in regards of lab results. Pt stated that the [place] lab shipped out the specimen out for the NIPT test. We should be receiving results within 1 - 2 weeks.

## 2023-09-28 NOTE — TELEPHONE ENCOUNTER
----- Message from Burke Parnell M.D. sent at 9/25/2023  5:21 PM PDT -----  I ordered other 2 test including obstetric panel and NIPT test.  Please call for [place] [facility] to check why they canceled these orders. NIPT test can be done between 10 to 14 weeks of pregnancy and by the time patient sees OB, she will be at the end of 14 weeks and she will not be able to do this test.  Please let me know if they need different test order or patient needs to do this again.

## 2023-09-28 NOTE — TELEPHONE ENCOUNTER
Phone Number Called: [phone]    Call outcome: Did not leave a detailed message. Requested patient to call back.    Message: Attempted to contact pt in regards of lab results. Could not leave pt a vm, VM box was not set up.

## 2023-11-01 ENCOUNTER — OFFICE VISIT (OUTPATIENT)
Dept: MEDICAL GROUP | Facility: PHYSICIAN GROUP | Age: 33
End: 2023-11-01
Payer: COMMERCIAL

## 2023-11-01 VITALS
SYSTOLIC BLOOD PRESSURE: 140 MMHG | TEMPERATURE: 98.2 F | HEIGHT: 64 IN | DIASTOLIC BLOOD PRESSURE: 82 MMHG | OXYGEN SATURATION: 96 % | BODY MASS INDEX: 27.83 KG/M2 | HEART RATE: 53 BPM | WEIGHT: 163 LBS

## 2023-11-01 DIAGNOSIS — R05.1 ACUTE COUGH: ICD-10-CM

## 2023-11-01 PROCEDURE — 3077F SYST BP >= 140 MM HG: CPT | Performed by: STUDENT IN AN ORGANIZED HEALTH CARE EDUCATION/TRAINING PROGRAM

## 2023-11-01 PROCEDURE — 3079F DIAST BP 80-89 MM HG: CPT | Performed by: STUDENT IN AN ORGANIZED HEALTH CARE EDUCATION/TRAINING PROGRAM

## 2023-11-01 PROCEDURE — 99213 OFFICE O/P EST LOW 20 MIN: CPT | Performed by: STUDENT IN AN ORGANIZED HEALTH CARE EDUCATION/TRAINING PROGRAM

## 2023-11-01 RX ORDER — ASPIRIN 81 MG
81 TABLET,CHEWABLE ORAL DAILY
Status: ON HOLD | COMMUNITY
Start: 2023-10-20 | End: 2024-03-09

## 2023-11-01 ASSESSMENT — FIBROSIS 4 INDEX: FIB4 SCORE: 0.93

## 2023-11-01 NOTE — PROGRESS NOTES
"Subjective:     Chief Complaint   Patient presents with    Cough     Dry cough x3 weeks sore throat after using cpap machine.         HPI:   Shara is a patient of Bryce Mcfarlane that presents today with cough.  Patient reports dry cough and sore throat started 3 weeks ago when she started using her CPAP.  Patient reports cough is worse at night.  Patient reports she stopped using the CPAP 2 weeks ago but cough has not improved.  Patient has tried gargling, robitussin DM and Halls cough drops.  Patient denies sick contacts, fever, sputum production, diarrhea, ear pressure, teeth sensitivity.  Patient is currently 18 weeks pregnant.  Patient has been using humidifier.      ROS:  Negative except as stated above.      Objective:     Exam:  BP (!) 140/82 (BP Location: Left arm, Patient Position: Sitting, BP Cuff Size: Adult)   Pulse (!) 53   Temp 36.8 °C (98.2 °F) (Tympanic)   Ht 1.626 m (5' 4\")   Wt 73.9 kg (163 lb)   SpO2 96%   BMI 27.98 kg/m²  Body mass index is 27.98 kg/m².    Physical Exam    Gen: Alert and oriented, no acute distress.  ENMT: TMs translucent with good landmarks bilaterally.  No erythema of ear canals.  No oropharyngeal erythema or exudate.  No tonsillar hypertrophy or exudate.  Neck: Neck is supple, trachea middle, no palpable lymphadenopathy.  Lungs: Normal effort, CTAB, no wheezing / rhonchi / rales.  CV: RRR, normal S1 and S2, no murmurs.      Assessment & Plan:     33 y.o. female with the following -     1. Acute cough  Acute.  Due to CPAP vs viral URI.  Antibiotics not indicated at this time.  We discussed symptomatic care that is safe in pregnancy.        Return if symptoms worsen or fail to improve.    Please note that this dictation was created using voice recognition software. I have made every reasonable attempt to correct obvious errors, but I expect that there are errors of grammar and possibly content that I did not discover before finalizing the note.        "

## 2023-11-09 ENCOUNTER — APPOINTMENT (OUTPATIENT)
Dept: OBGYN | Facility: CLINIC | Age: 33
End: 2023-11-09
Payer: COMMERCIAL

## 2023-11-18 ENCOUNTER — APPOINTMENT (OUTPATIENT)
Dept: RADIOLOGY | Facility: MEDICAL CENTER | Age: 33
End: 2023-11-18
Attending: EMERGENCY MEDICINE
Payer: COMMERCIAL

## 2023-11-18 ENCOUNTER — HOSPITAL ENCOUNTER (EMERGENCY)
Facility: MEDICAL CENTER | Age: 33
End: 2023-11-19
Attending: EMERGENCY MEDICINE
Payer: COMMERCIAL

## 2023-11-18 VITALS
TEMPERATURE: 97.3 F | WEIGHT: 165.79 LBS | RESPIRATION RATE: 18 BRPM | HEIGHT: 64 IN | BODY MASS INDEX: 28.3 KG/M2 | OXYGEN SATURATION: 97 % | DIASTOLIC BLOOD PRESSURE: 96 MMHG | HEART RATE: 76 BPM | SYSTOLIC BLOOD PRESSURE: 146 MMHG

## 2023-11-18 DIAGNOSIS — O13.2 GESTATIONAL HYPERTENSION, SECOND TRIMESTER: ICD-10-CM

## 2023-11-18 LAB
ALBUMIN SERPL BCP-MCNC: 3.9 G/DL (ref 3.2–4.9)
ALBUMIN/GLOB SERPL: 1.2 G/DL
ALP SERPL-CCNC: 49 U/L (ref 30–99)
ALT SERPL-CCNC: 41 U/L (ref 2–50)
ANION GAP SERPL CALC-SCNC: 13 MMOL/L (ref 7–16)
APPEARANCE UR: CLEAR
AST SERPL-CCNC: 44 U/L (ref 12–45)
BACTERIA #/AREA URNS HPF: NEGATIVE /HPF
BASOPHILS # BLD AUTO: 0.2 % (ref 0–1.8)
BASOPHILS # BLD: 0.02 K/UL (ref 0–0.12)
BILIRUB SERPL-MCNC: 0.2 MG/DL (ref 0.1–1.5)
BILIRUB UR QL STRIP.AUTO: NEGATIVE
BUN SERPL-MCNC: 13 MG/DL (ref 8–22)
CALCIUM ALBUM COR SERPL-MCNC: 9.5 MG/DL (ref 8.5–10.5)
CALCIUM SERPL-MCNC: 9.4 MG/DL (ref 8.5–10.5)
CHLORIDE SERPL-SCNC: 103 MMOL/L (ref 96–112)
CO2 SERPL-SCNC: 20 MMOL/L (ref 20–33)
COLOR UR: YELLOW
CREAT SERPL-MCNC: 0.58 MG/DL (ref 0.5–1.4)
EKG IMPRESSION: NORMAL
EOSINOPHIL # BLD AUTO: 0.21 K/UL (ref 0–0.51)
EOSINOPHIL NFR BLD: 1.8 % (ref 0–6.9)
EPI CELLS #/AREA URNS HPF: ABNORMAL /HPF
ERYTHROCYTE [DISTWIDTH] IN BLOOD BY AUTOMATED COUNT: 42.7 FL (ref 35.9–50)
GFR SERPLBLD CREATININE-BSD FMLA CKD-EPI: 121 ML/MIN/1.73 M 2
GLOBULIN SER CALC-MCNC: 3.3 G/DL (ref 1.9–3.5)
GLUCOSE SERPL-MCNC: 99 MG/DL (ref 65–99)
GLUCOSE UR STRIP.AUTO-MCNC: NEGATIVE MG/DL
HCT VFR BLD AUTO: 37.4 % (ref 37–47)
HGB BLD-MCNC: 12.9 G/DL (ref 12–16)
HYALINE CASTS #/AREA URNS LPF: ABNORMAL /LPF
IMM GRANULOCYTES # BLD AUTO: 0.11 K/UL (ref 0–0.11)
IMM GRANULOCYTES NFR BLD AUTO: 1 % (ref 0–0.9)
KETONES UR STRIP.AUTO-MCNC: NEGATIVE MG/DL
LEUKOCYTE ESTERASE UR QL STRIP.AUTO: ABNORMAL
LYMPHOCYTES # BLD AUTO: 2.46 K/UL (ref 1–4.8)
LYMPHOCYTES NFR BLD: 21.4 % (ref 22–41)
MAGNESIUM SERPL-MCNC: 1.7 MG/DL (ref 1.5–2.5)
MCH RBC QN AUTO: 30.8 PG (ref 27–33)
MCHC RBC AUTO-ENTMCNC: 34.5 G/DL (ref 32.2–35.5)
MCV RBC AUTO: 89.3 FL (ref 81.4–97.8)
MICRO URNS: ABNORMAL
MONOCYTES # BLD AUTO: 0.79 K/UL (ref 0–0.85)
MONOCYTES NFR BLD AUTO: 6.9 % (ref 0–13.4)
NEUTROPHILS # BLD AUTO: 7.89 K/UL (ref 1.82–7.42)
NEUTROPHILS NFR BLD: 68.7 % (ref 44–72)
NITRITE UR QL STRIP.AUTO: NEGATIVE
NRBC # BLD AUTO: 0 K/UL
NRBC BLD-RTO: 0 /100 WBC (ref 0–0.2)
PH UR STRIP.AUTO: 6 [PH] (ref 5–8)
PLATELET # BLD AUTO: 275 K/UL (ref 164–446)
PMV BLD AUTO: 9 FL (ref 9–12.9)
POTASSIUM SERPL-SCNC: 3.7 MMOL/L (ref 3.6–5.5)
PROT SERPL-MCNC: 7.2 G/DL (ref 6–8.2)
PROT UR QL STRIP: NEGATIVE MG/DL
RBC # BLD AUTO: 4.19 M/UL (ref 4.2–5.4)
RBC # URNS HPF: ABNORMAL /HPF
RBC UR QL AUTO: NEGATIVE
SODIUM SERPL-SCNC: 136 MMOL/L (ref 135–145)
SP GR UR STRIP.AUTO: 1.02
TROPONIN T SERPL-MCNC: <6 NG/L (ref 6–19)
UROBILINOGEN UR STRIP.AUTO-MCNC: 0.2 MG/DL
WBC # BLD AUTO: 11.5 K/UL (ref 4.8–10.8)
WBC #/AREA URNS HPF: ABNORMAL /HPF

## 2023-11-18 PROCEDURE — 99284 EMERGENCY DEPT VISIT MOD MDM: CPT

## 2023-11-18 PROCEDURE — 71045 X-RAY EXAM CHEST 1 VIEW: CPT

## 2023-11-18 PROCEDURE — 83735 ASSAY OF MAGNESIUM: CPT

## 2023-11-18 PROCEDURE — 85025 COMPLETE CBC W/AUTO DIFF WBC: CPT

## 2023-11-18 PROCEDURE — 87086 URINE CULTURE/COLONY COUNT: CPT

## 2023-11-18 PROCEDURE — 36415 COLL VENOUS BLD VENIPUNCTURE: CPT

## 2023-11-18 PROCEDURE — 84484 ASSAY OF TROPONIN QUANT: CPT

## 2023-11-18 PROCEDURE — 80053 COMPREHEN METABOLIC PANEL: CPT

## 2023-11-18 PROCEDURE — 81001 URINALYSIS AUTO W/SCOPE: CPT

## 2023-11-18 PROCEDURE — 93005 ELECTROCARDIOGRAM TRACING: CPT | Performed by: EMERGENCY MEDICINE

## 2023-11-18 ASSESSMENT — PAIN DESCRIPTION - PAIN TYPE: TYPE: ACUTE PAIN

## 2023-11-18 ASSESSMENT — FIBROSIS 4 INDEX: FIB4 SCORE: 0.93

## 2023-11-19 NOTE — ED TRIAGE NOTES
".BP (!) 159/100   Pulse 79   Temp 36.3 °C (97.3 °F) (Temporal)   Resp 16   Ht 1.626 m (5' 4\")   Wt 75.2 kg (165 lb 12.6 oz)   SpO2 96%   BMI 28.46 kg/m²   .  Chief Complaint   Patient presents with    Hypertension     Pt c/c of high blood pressure x 1 week. Pt saw OB on Friday and started on labetalol. Pt states feels shaky since starting BP meds. Pt 20 weeks pregnant.   -SOB. +L sided chest pain, non radiating.     Pregnancy    Chest Pain     Pt chest pain since Friday. PT states non radiating. Pain feels tightness in chest.      EKG ordered. PT ambulate to triage independently. Pt Aox4, GCS15. Educated on triage process.  Charge notified. L&D charge notified & stated to stay in ER and OB can be consulted per ERP.   "

## 2023-11-19 NOTE — DISCHARGE INSTRUCTIONS
Use follow-up with Dr. Berman next week for further evaluation.  Continue to take your Betamol medication twice daily.  Your medication will bring down your blood pressure slowly.  At this point do not have a significant life threat but we do recommend that you return to the emergency department you have severe pain, shortness of breath or associated symptoms.  I have discussed you with the on-call physician, Dr. Ledbetter, she states okay for you to go home with a blood pressure 146/96 and to follow-up as an outpatient with Dr. Berman.

## 2023-11-19 NOTE — ED PROVIDER NOTES
ED Provider Note    CHIEF COMPLAINT  Chief Complaint   Patient presents with    Hypertension     Pt c/c of high blood pressure x 1 week. Pt saw OB on Friday and started on labetalol. Pt states feels shaky since starting BP meds. Pt 20 weeks pregnant.   -SOB. +L sided chest pain, non radiating.     Pregnancy    Chest Pain     Pt chest pain since Friday. PT states non radiating. Pain feels tightness in chest.        EXTERNAL RECORDS REVIEWED  Office visit on 11/1/2023 with  for cough and sore throat after using CPAP machine    HPI/ROS      Shara AMADOR Am Is is a 33 y.o. female who presents as a G2, P1 female she states that she is 20 weeks and she sees Dr. Berman as well as [facility] as she is 33 years old.  She was diagnosed with elevated blood pressure approximately 1 week ago and has been following up with her OB and was placed on labetalol 100 mg twice daily and started yesterday.  She states that she woke up and she is feeling jittery, off, having a hard time concentrating, as well as feeling chest pressure.  She has no chest pain, shortness of breath, cough, fever.  She states she normally does not have a history of elevated blood pressure but has not now.  She was instructed to come to the emergency department here by Dr. Berman if she had elevated blood pressure.  She has been doing a log at home and her blood pressure is up in the 150s over 90 range this is high for her.    Cardiac Risk Factors:  No Age > 55  No Aspirin use within 7 days  No prior history of coronary artery disease  No diabetes  No hyperlipidemia/chloesterol   No hypertension, just gestational  No obesity  No family history of coronary artery disease at a young age <56 yo  No tobacco use   No drugs (methamphetamine or cocaine)  No history of aortic aneurysm   No history of aortic dissection   No history of deep vein thrombosis or pulmonary embolism   No hormone replacement  No oral birth control   The patient is pregnant    PAST  "MEDICAL HISTORY   has a past medical history of Head ache.    SURGICAL HISTORY   has a past surgical history that includes primary c section (3/13/2012).    FAMILY HISTORY  Family History   Problem Relation Age of Onset    Diabetes Father         Insulin dependent    Hypertension Sister     Diabetes Maternal Grandmother 50        insulin dependent    Cancer Neg Hx     Heart Disease Neg Hx     Stroke Neg Hx        SOCIAL HISTORY  Social History     Tobacco Use    Smoking status: Never    Smokeless tobacco: Never   Vaping Use    Vaping Use: Never used   Substance and Sexual Activity    Alcohol use: Yes     Comment: 3 drinks every few months    Drug use: No    Sexual activity: Yes     Partners: Male     Birth control/protection: Pill     Comment: 9-year old       CURRENT MEDICATIONS  Home Medications       Reviewed by Melva Sebastian R.N. (Registered Nurse) on 11/18/23 at 2014  Med List Status: Partial     Medication Last Dose Status   ASPIRIN LOW DOSE 81 MG Chew Tab chewable tablet  Active   triamcinolone acetonide (KENALOG) 0.1 % Ointment  Active                    ALLERGIES  Allergies   Allergen Reactions    Crab        PHYSICAL EXAM  VITAL SIGNS: BP (!) 146/96   Pulse 76   Temp 36.3 °C (97.3 °F) (Temporal)   Resp 18   Ht 1.626 m (5' 4\")   Wt 75.2 kg (165 lb 12.6 oz)   SpO2 97%   BMI 28.46 kg/m²      Nursing notes and vitals reviewed.  Constitutional: Well developed, Well nourished, No acute distress, Non-toxic appearance.   Eyes: PERRLA, EOMI, Conjunctiva normal, No discharge.   HENT: Normocephalic, Atraumatic, moist mucous membranes, no facial edema Cardiovascular: Normal heart rate, Normal rhythm, No murmurs, No rubs, No gallops.   Thorax & Lungs: No respiratory distress, No rales, No rhonchi, No wheezing, No chest tenderness.   Abdomen: Bowel sounds normal, Soft, fundal height approximate the umbilicus, no tenderness, No guarding, No rebound, No masses, No pulsatile masses.   Skin: Warm, Dry, No " erythema, No rash.   Extremities: No deformity, no pedal edema, good range of motion range of motion upper lower extremes bilaterally  Neurologic: Alert & oriented x 3, no focal abnormalities noted, acting appropriately on examination  Psychiatric: Affect normal for clinical presentation.      DIAGNOSTIC STUDIES / PROCEDURES  EKG  I have independently interpreted this EKG  Normal sinus rhythm on monitor    LABS        RADIOLOGY  I have independently interpreted the diagnostic imaging associated with this visit and am waiting the final reading from the radiologist.   My preliminary interpretation is as follows: Chest x-ray is negative for infiltrate  Radiologist interpretation:   DX-CHEST-LIMITED (1 VIEW)   Final Result      No acute cardiopulmonary disease.              COURSE & MEDICAL DECISION MAKING    ED Observation Status? No; Patient does not meet criteria for ED Observation.     INITIAL ASSESSMENT, COURSE AND PLAN  Care Narrative: This a pleasant 33-year-old female  20 weeks and urine pregnancy with elevated blood pressure.  She just feels off here in the emergency department.  She has no significant tachycardia, hypoxia contactant p.o., she has no chest pains emergency chest discomfort I do not believe she has a pulmonary embolism.  In addition, EKG is negative with a heart score of 1 I do not believe the patient is experiencing myocardial infarction then.  The patient did have an elevated blood pressure in the 157/90 range and the patient had no evidence of acute kidney injury, her urinalysis is negative for protein, no evidence of preeclampsia and she is extremely early for preeclampsia as well.  The patient's blood pressure did come down to 146/96 and I discussed the patient with Dr. Draper who is on-call for Dr. Jama.  She states that is okay for the patient to go home with his blood pressure and wait for labetalol to take effect as she is only taken it for 2 days.  She recommends that she follows  up with Dr. Laguerre as an outpatient return as needed.  Once again the patient has no evidence of significant preeclampsia or eclampsia, hypertensive urgency, no evidence of acute coronary syndrome, she is not hypoxic, tachypneic, tachycardic and do not suspect the patient is experiencing a pulmonary embolism, she has no historical clinical presentation raver dissection or aortic aneurysm.      DISPOSITION AND DISCUSSIONS  I have discussed management of the patient with the following physicians and SHIKHA's: Dr. Vasquez who states the patient is fine for outpatient evaluation and continue take labetalol old.  She does not not recommended we decrease her blood pressure here in the emergency department.      Escalation of care considered, and ultimately not performed:acute inpatient care management, however at this time, the patient is most appropriate for outpatient management    Decision tools and prescription drugs considered including, but not limited to: PERC rule negative .  Therefore CT pulmonary angiogram was not completed.    FINAL DIAGNOSIS  1. Gestational hypertension, second trimester          DISPOSITION:  Patient will be discharged home in stable condition.    FOLLOW UP:  [facility], Emergency Dept  [address] [id number] [phone]    If symptoms worsen    Alvin Mcfarlane, P.A.-C.  [address]  [address]  [facility] [id number] [phone]    Schedule an appointment as soon as possible for a visit   As needed    Sarina Berman M.D.  [address]  [address]  [facility] [id number] [phone]    Schedule an appointment as soon as possible for a visit in 2 days          Electronically signed by: Gallito Rothman D.O., 11/18/2023 9:33 PM

## 2023-11-19 NOTE — ED NOTES
EKG completed in triage prior to patient being roomed in [facility] 24. EKG uploaded under media due to issue with EKG machine.

## 2023-11-21 LAB
BACTERIA UR CULT: NORMAL
SIGNIFICANT IND 70042: NORMAL
SITE SITE: NORMAL
SOURCE SOURCE: NORMAL

## 2024-01-13 ENCOUNTER — HOSPITAL ENCOUNTER (OUTPATIENT)
Dept: LAB | Facility: MEDICAL CENTER | Age: 34
End: 2024-01-13
Attending: OBSTETRICS & GYNECOLOGY
Payer: COMMERCIAL

## 2024-01-15 ENCOUNTER — HOSPITAL ENCOUNTER (OUTPATIENT)
Dept: LAB | Facility: MEDICAL CENTER | Age: 34
End: 2024-01-15
Attending: OBSTETRICS & GYNECOLOGY
Payer: COMMERCIAL

## 2024-01-15 LAB
GLUCOSE 1H P CHAL SERPL-MCNC: 220 MG/DL (ref 65–180)
GLUCOSE 2H P CHAL SERPL-MCNC: 185 MG/DL (ref 65–155)
GLUCOSE 3H P CHAL SERPL-MCNC: 133 MG/DL (ref 65–140)
GLUCOSE BS SERPL-MCNC: 105 MG/DL (ref 65–95)

## 2024-01-15 PROCEDURE — 82952 GTT-ADDED SAMPLES: CPT

## 2024-01-15 PROCEDURE — 82951 GLUCOSE TOLERANCE TEST (GTT): CPT

## 2024-01-15 PROCEDURE — 36415 COLL VENOUS BLD VENIPUNCTURE: CPT

## 2024-02-02 ENCOUNTER — TELEPHONE (OUTPATIENT)
Dept: ANESTHESIOLOGY | Facility: MEDICAL CENTER | Age: 34
End: 2024-02-02
Payer: COMMERCIAL

## 2024-02-02 NOTE — TELEPHONE ENCOUNTER
"This is a  [facility] pregnancy patient who is scheduled to have a repeat  on 24.    I was asked to call this patient by her OB provider, Dr. Berman, to discuss a skin reaction the patient had during her previous . Her first  was in  at [facility], but I'm unable to see the old, scanned anesthesia record in the EMR. The patient reports she required an urgent . Anesthesia for the  was provided via an in situ labor epidural. The patient reported that initially the epidural anesthesia was inadequate, and she required additional epidural medication that made her extremely numb. Although the extreme numbness was somewhat concerning to her at the time, she reports that she was comfortable throughout the  and did not experience any symptoms concerning for a high epidural level. The patient reported that the skin reaction most likely developed in the PACU but it may have initially started near the end of the surgery. She reports that she had two large bumps on her forehead which she described as \"horns.\" She did not develop any additional skin reactions, shortness of breath, or hemodynamic changes. Looking at the progress notes in Epic, the nurse described the skin reaction as \"facial hives\" and Benadryl was given. The patient has no known drug allergies and has not had any similar reaction since this incident. The patient was concerned that the reaction could be due to the epidural anesthesia.    Given the timing and description of the reaction, I told the patient that it is unlikely that her reaction was caused by the epidural local anesthetic. I think it is more likely that the reaction was caused by something else the patient was exposed to during the surgery: IV administered medications, topical skin preps, or possibly a heat or contact rash. Ultimately, I don't see any reason to avoid neuraxial anesthesia or any of the usual medications administered by " anesthesia during routine c-sections. However, I would keep a close eye out for developing skin reactions or other symptoms concerning for an allergic reaction and have Benadryl at the ready.    All of her questions and concerns were addressed. Please contact me if there are any additional questions or concerns.    Meng Alba MD  [facility]

## 2024-02-12 ENCOUNTER — HOSPITAL ENCOUNTER (EMERGENCY)
Facility: MEDICAL CENTER | Age: 34
End: 2024-02-12
Attending: OBSTETRICS & GYNECOLOGY | Admitting: OBSTETRICS & GYNECOLOGY
Payer: COMMERCIAL

## 2024-02-12 VITALS
TEMPERATURE: 98 F | WEIGHT: 170 LBS | OXYGEN SATURATION: 98 % | DIASTOLIC BLOOD PRESSURE: 93 MMHG | RESPIRATION RATE: 18 BRPM | HEIGHT: 64 IN | BODY MASS INDEX: 29.02 KG/M2 | HEART RATE: 72 BPM | SYSTOLIC BLOOD PRESSURE: 138 MMHG

## 2024-02-12 LAB
ALBUMIN SERPL BCP-MCNC: 3.2 G/DL (ref 3.2–4.9)
ALBUMIN/GLOB SERPL: 1 G/DL
ALP SERPL-CCNC: 79 U/L (ref 30–99)
ALT SERPL-CCNC: 25 U/L (ref 2–50)
ANION GAP SERPL CALC-SCNC: 14 MMOL/L (ref 7–16)
AST SERPL-CCNC: 34 U/L (ref 12–45)
BASOPHILS # BLD AUTO: 0.2 % (ref 0–1.8)
BASOPHILS # BLD: 0.02 K/UL (ref 0–0.12)
BILIRUB SERPL-MCNC: 0.2 MG/DL (ref 0.1–1.5)
BUN SERPL-MCNC: 14 MG/DL (ref 8–22)
CALCIUM ALBUM COR SERPL-MCNC: 9.4 MG/DL (ref 8.5–10.5)
CALCIUM SERPL-MCNC: 8.8 MG/DL (ref 8.5–10.5)
CHLORIDE SERPL-SCNC: 102 MMOL/L (ref 96–112)
CO2 SERPL-SCNC: 18 MMOL/L (ref 20–33)
CREAT SERPL-MCNC: 0.76 MG/DL (ref 0.5–1.4)
CREAT UR-MCNC: 94.34 MG/DL
EOSINOPHIL # BLD AUTO: 0.14 K/UL (ref 0–0.51)
EOSINOPHIL NFR BLD: 1.7 % (ref 0–6.9)
ERYTHROCYTE [DISTWIDTH] IN BLOOD BY AUTOMATED COUNT: 39.5 FL (ref 35.9–50)
GFR SERPLBLD CREATININE-BSD FMLA CKD-EPI: 105 ML/MIN/1.73 M 2
GLOBULIN SER CALC-MCNC: 3.1 G/DL (ref 1.9–3.5)
GLUCOSE SERPL-MCNC: 137 MG/DL (ref 65–99)
HCT VFR BLD AUTO: 32.2 % (ref 37–47)
HGB BLD-MCNC: 11.5 G/DL (ref 12–16)
IMM GRANULOCYTES # BLD AUTO: 0.03 K/UL (ref 0–0.11)
IMM GRANULOCYTES NFR BLD AUTO: 0.4 % (ref 0–0.9)
LYMPHOCYTES # BLD AUTO: 2.22 K/UL (ref 1–4.8)
LYMPHOCYTES NFR BLD: 27.1 % (ref 22–41)
MCH RBC QN AUTO: 30.3 PG (ref 27–33)
MCHC RBC AUTO-ENTMCNC: 35.7 G/DL (ref 32.2–35.5)
MCV RBC AUTO: 85 FL (ref 81.4–97.8)
MONOCYTES # BLD AUTO: 0.51 K/UL (ref 0–0.85)
MONOCYTES NFR BLD AUTO: 6.2 % (ref 0–13.4)
NEUTROPHILS # BLD AUTO: 5.26 K/UL (ref 1.82–7.42)
NEUTROPHILS NFR BLD: 64.4 % (ref 44–72)
NRBC # BLD AUTO: 0 K/UL
NRBC BLD-RTO: 0 /100 WBC (ref 0–0.2)
PLATELET # BLD AUTO: 252 K/UL (ref 164–446)
PMV BLD AUTO: 9.4 FL (ref 9–12.9)
POTASSIUM SERPL-SCNC: 3.1 MMOL/L (ref 3.6–5.5)
PROT SERPL-MCNC: 6.3 G/DL (ref 6–8.2)
PROT UR-MCNC: 9 MG/DL (ref 0–15)
PROT/CREAT UR: 95 MG/G (ref 10–107)
RBC # BLD AUTO: 3.79 M/UL (ref 4.2–5.4)
SODIUM SERPL-SCNC: 134 MMOL/L (ref 135–145)
WBC # BLD AUTO: 8.2 K/UL (ref 4.8–10.8)

## 2024-02-12 PROCEDURE — 80053 COMPREHEN METABOLIC PANEL: CPT

## 2024-02-12 PROCEDURE — 700102 HCHG RX REV CODE 250 W/ 637 OVERRIDE(OP): Performed by: OBSTETRICS & GYNECOLOGY

## 2024-02-12 PROCEDURE — 84156 ASSAY OF PROTEIN URINE: CPT

## 2024-02-12 PROCEDURE — A9270 NON-COVERED ITEM OR SERVICE: HCPCS | Performed by: OBSTETRICS & GYNECOLOGY

## 2024-02-12 PROCEDURE — 59025 FETAL NON-STRESS TEST: CPT

## 2024-02-12 PROCEDURE — 82570 ASSAY OF URINE CREATININE: CPT

## 2024-02-12 PROCEDURE — 99285 EMERGENCY DEPT VISIT HI MDM: CPT

## 2024-02-12 PROCEDURE — 85025 COMPLETE CBC W/AUTO DIFF WBC: CPT

## 2024-02-12 PROCEDURE — 36415 COLL VENOUS BLD VENIPUNCTURE: CPT

## 2024-02-12 RX ORDER — LABETALOL 100 MG/1
200 TABLET, FILM COATED ORAL ONCE
Status: COMPLETED | OUTPATIENT
Start: 2024-02-12 | End: 2024-02-12

## 2024-02-12 RX ADMIN — LABETALOL HYDROCHLORIDE 200 MG: 100 TABLET, FILM COATED ORAL at 19:50

## 2024-02-12 ASSESSMENT — PAIN SCALES - GENERAL: PAINLEVEL: 0 - NO PAIN

## 2024-02-12 ASSESSMENT — FIBROSIS 4 INDEX: FIB4 SCORE: 0.85

## 2024-02-13 NOTE — ED PROVIDER NOTES
DATE OF SERVICE:  2024        HISTORY OF PRESENT ILLNESS:  The patient is a 33-year-old female,  who   presents to labor and delivery at 32 and 2/7th weeks' gestation, EDC of   2024 after being [facility] and her blood   pressures were higher than her normal. High 150s/100s is what they were in   their office.  She does have gestational hypertension and was started on   labetalol on 2024.  She has now been increased to 200 mg b.i.d. and has   also been now diagnosed with gestational diabetes.  She was, two days ago,   started on long-acting insulin at night.  She is tired, but she denies any   headache, blurry vision, or right upper quadrant pain.  She just reports it   has been a long day after going to work and then to her visit and then coming   here to labor and delivery.     PAST MEDICAL HISTORY:  History of prediabetes.     ALLERGIES:  None.     SOCIAL HISTORY:  She is a  at [employer]. Father of   the baby's name is see chart. Denies any alcohol, tobacco, or current   recreational drug use.     FAMILY HISTORY:  Includes diabetes and hypertension.     PAST SURGICAL HISTORY:  Includes  in 2012.     OBSTETRIC HISTORY:  , one primary  in 2012 at 37 weeks for   malpresentation.     GYNECOLOGIC HISTORY:  Last menstrual period 2023.  No history of   abnormal Paps.  She does have a history of chlamydia that was treated in 2018,   but denies any history of herpes simplex virus or other STDs.     PHYSICAL EXAMINATION:  VITAL SIGNS:  Here initial blood pressure was the only severe range one,   162/92.  The rest of her blood pressures have been labile, 130s-150s/70s-90s,   pulse is in the 60s-70s.  She is satting 98% on room air and is afebrile.  GENERAL:  She is awake, alert, oriented.  She is in no acute distress but does   appear fatigued.  CARDIOVASCULAR:  Regular rate and rhythm.  CHEST:  Clear to auscultation  bilaterally.  ABDOMEN:  Gravid, nontender, nondistended, normal bowel sounds.  EXTREMITIES:  No cyanosis, clubbing, or edema.  PELVIC:  Sterile vaginal exam was deferred.  Fetal heart rate tracing category   1, reactive, moderate variability, no decelerations, no contractions.     LABORATORY DATA:  Hemoglobin 11.5, hematocrit 32.2, platelet count 252,000.    Her potassium was slightly low at 3.1.  Glucose is currently 137.  AST is 34.   ALT 25.  Creatinine 0.76 and her protein creatinine ratio is only 95.     ASSESSMENT AND PLAN:  A 33-year-old female,  at 32 and 2/7th weeks'   gestation.  1.  Gestational hypertension, currently on labetalol 200 mg b.i.d.  She takes   her labetalol sometime between 7-9 o'clock, so she is due for her dosing at   this time anyway.  Her blood pressure was a little more elevated than her   usual at [facility] today, so she was sent over for serial   blood pressures and labs. Labs are all stable, although her potassium is   slightly low.  She has no evidence of superimposed preeclampsia at this time,   and she is asymptomatic.  I discussed and reviewed her lab work as well as her   blood pressures with Dr. Singleton over the telephone, and he recommended for her   to be sent home, outpatient management and monitoring.  She will continue her   current labetalol 200 mg b.i.d. and will not increase it as I do not want to   mask it if she does get preeclamptic.  Advised her to decrease her stress at   work and at home as best as possible.  Precautions given, and she will still   continue to take home blood pressures twice a day.  Additionally, she has a   scheduled fetal nonstress test and OB check and amniotic fluid index already   set up on Thursday in our office with Dr. Berman.  2.  Gestational diabetes.  She did start on insulin, and we will continue this   regimen, which she is comfortable with.        ______________________________  MD REDD DURÁN/LILO    DD:   02/12/2024 20:23  DT:  02/12/2024 21:02    Job#:  [id number]

## 2024-02-13 NOTE — PROGRESS NOTES
1900 report received from Elinor ENG. Care assumed.     2009 Dr Tran at bedside, d/c orders received    2028 d/c instructions reviewed w/ pt. Understanding verbalized. Pt ambulated off unit.

## 2024-02-13 NOTE — PROGRESS NOTES
2024   EGA    32w2d    1803: TOCO/US applied, pt educated. Pt was sent over from the office by Dr. Berman related to elevated BP's. Pt declines headaches, vision changes, any new generalized swelling, and no epigastric pain. Pt declines LOF, VB, UC's, and states +FM. Pt's pregnancy has been complicated by hypertension and gestational diabetes, insulin dependent. Pt takes 200 mg of labetalol BID, and last dose was this morning. POC discussed, all questions and concerns addressed.     : Dr. Tran updated with patient's status, lab orders received.     : Report given to VELASQUEZ Harris.

## 2024-02-16 ENCOUNTER — HOSPITAL ENCOUNTER (EMERGENCY)
Facility: MEDICAL CENTER | Age: 34
End: 2024-02-16
Attending: OBSTETRICS & GYNECOLOGY | Admitting: OBSTETRICS & GYNECOLOGY
Payer: COMMERCIAL

## 2024-02-16 VITALS
OXYGEN SATURATION: 100 % | HEIGHT: 64 IN | HEART RATE: 73 BPM | BODY MASS INDEX: 29.37 KG/M2 | DIASTOLIC BLOOD PRESSURE: 80 MMHG | RESPIRATION RATE: 18 BRPM | SYSTOLIC BLOOD PRESSURE: 124 MMHG | WEIGHT: 172 LBS | TEMPERATURE: 97.2 F

## 2024-02-16 LAB
ALBUMIN SERPL BCP-MCNC: 3.5 G/DL (ref 3.2–4.9)
ALBUMIN/GLOB SERPL: 1.1 G/DL
ALP SERPL-CCNC: 93 U/L (ref 30–99)
ALT SERPL-CCNC: 22 U/L (ref 2–50)
ANION GAP SERPL CALC-SCNC: 14 MMOL/L (ref 7–16)
APPEARANCE UR: CLEAR
AST SERPL-CCNC: 27 U/L (ref 12–45)
BASOPHILS # BLD AUTO: 0.1 % (ref 0–1.8)
BASOPHILS # BLD: 0.01 K/UL (ref 0–0.12)
BILIRUB SERPL-MCNC: 0.3 MG/DL (ref 0.1–1.5)
BUN SERPL-MCNC: 10 MG/DL (ref 8–22)
CALCIUM ALBUM COR SERPL-MCNC: 9.1 MG/DL (ref 8.5–10.5)
CALCIUM SERPL-MCNC: 8.7 MG/DL (ref 8.5–10.5)
CHLORIDE SERPL-SCNC: 107 MMOL/L (ref 96–112)
CO2 SERPL-SCNC: 18 MMOL/L (ref 20–33)
COLOR UR AUTO: ABNORMAL
CREAT SERPL-MCNC: 0.54 MG/DL (ref 0.5–1.4)
CREAT UR-MCNC: 177.8 MG/DL
EOSINOPHIL # BLD AUTO: 0.12 K/UL (ref 0–0.51)
EOSINOPHIL NFR BLD: 1.2 % (ref 0–6.9)
ERYTHROCYTE [DISTWIDTH] IN BLOOD BY AUTOMATED COUNT: 40.1 FL (ref 35.9–50)
GFR SERPLBLD CREATININE-BSD FMLA CKD-EPI: 123 ML/MIN/1.73 M 2
GLOBULIN SER CALC-MCNC: 3.2 G/DL (ref 1.9–3.5)
GLUCOSE BLD STRIP.AUTO-MCNC: 85 MG/DL (ref 65–99)
GLUCOSE SERPL-MCNC: 134 MG/DL (ref 65–99)
GLUCOSE UR QL STRIP.AUTO: NEGATIVE MG/DL
HCT VFR BLD AUTO: 38.2 % (ref 37–47)
HGB BLD-MCNC: 13.3 G/DL (ref 12–16)
IMM GRANULOCYTES # BLD AUTO: 0.03 K/UL (ref 0–0.11)
IMM GRANULOCYTES NFR BLD AUTO: 0.3 % (ref 0–0.9)
KETONES UR QL STRIP.AUTO: NEGATIVE MG/DL
LEUKOCYTE ESTERASE UR QL STRIP.AUTO: NEGATIVE
LYMPHOCYTES # BLD AUTO: 1.97 K/UL (ref 1–4.8)
LYMPHOCYTES NFR BLD: 19.8 % (ref 22–41)
MCH RBC QN AUTO: 29.7 PG (ref 27–33)
MCHC RBC AUTO-ENTMCNC: 34.8 G/DL (ref 32.2–35.5)
MCV RBC AUTO: 85.3 FL (ref 81.4–97.8)
MONOCYTES # BLD AUTO: 0.51 K/UL (ref 0–0.85)
MONOCYTES NFR BLD AUTO: 5.1 % (ref 0–13.4)
NEUTROPHILS # BLD AUTO: 7.31 K/UL (ref 1.82–7.42)
NEUTROPHILS NFR BLD: 73.5 % (ref 44–72)
NITRITE UR QL STRIP.AUTO: NEGATIVE
NRBC # BLD AUTO: 0 K/UL
NRBC BLD-RTO: 0 /100 WBC (ref 0–0.2)
PH UR STRIP.AUTO: 6 [PH] (ref 5–8)
PLATELET # BLD AUTO: 273 K/UL (ref 164–446)
PMV BLD AUTO: 9.4 FL (ref 9–12.9)
POTASSIUM SERPL-SCNC: 3.8 MMOL/L (ref 3.6–5.5)
PROT SERPL-MCNC: 6.7 G/DL (ref 6–8.2)
PROT UR QL STRIP: 30 MG/DL
PROT UR-MCNC: 34 MG/DL (ref 0–15)
PROT/CREAT UR: 191 MG/G (ref 10–107)
RBC # BLD AUTO: 4.48 M/UL (ref 4.2–5.4)
RBC UR QL AUTO: NEGATIVE
SODIUM SERPL-SCNC: 139 MMOL/L (ref 135–145)
SP GR UR STRIP.AUTO: >=1.03 (ref 1–1.03)
WBC # BLD AUTO: 10 K/UL (ref 4.8–10.8)

## 2024-02-16 PROCEDURE — A9270 NON-COVERED ITEM OR SERVICE: HCPCS | Performed by: OBSTETRICS & GYNECOLOGY

## 2024-02-16 PROCEDURE — 80053 COMPREHEN METABOLIC PANEL: CPT

## 2024-02-16 PROCEDURE — 36415 COLL VENOUS BLD VENIPUNCTURE: CPT

## 2024-02-16 PROCEDURE — 85025 COMPLETE CBC W/AUTO DIFF WBC: CPT

## 2024-02-16 PROCEDURE — 81002 URINALYSIS NONAUTO W/O SCOPE: CPT

## 2024-02-16 PROCEDURE — 99284 EMERGENCY DEPT VISIT MOD MDM: CPT

## 2024-02-16 PROCEDURE — 700102 HCHG RX REV CODE 250 W/ 637 OVERRIDE(OP): Performed by: OBSTETRICS & GYNECOLOGY

## 2024-02-16 PROCEDURE — 82570 ASSAY OF URINE CREATININE: CPT

## 2024-02-16 PROCEDURE — 59025 FETAL NON-STRESS TEST: CPT

## 2024-02-16 PROCEDURE — 84156 ASSAY OF PROTEIN URINE: CPT

## 2024-02-16 PROCEDURE — 82962 GLUCOSE BLOOD TEST: CPT

## 2024-02-16 RX ORDER — NIFEDIPINE 10 MG/1
10 CAPSULE ORAL ONCE
Status: COMPLETED | OUTPATIENT
Start: 2024-02-16 | End: 2024-02-16

## 2024-02-16 RX ADMIN — NIFEDIPINE 10 MG: 10 CAPSULE ORAL at 12:07

## 2024-02-16 ASSESSMENT — PAIN DESCRIPTION - PAIN TYPE: TYPE: ACUTE PAIN

## 2024-02-16 ASSESSMENT — PAIN SCALES - GENERAL: PAINLEVEL: 0 - NO PAIN

## 2024-02-16 ASSESSMENT — FIBROSIS 4 INDEX: FIB4 SCORE: 0.92

## 2024-02-16 NOTE — DISCHARGE INSTRUCTIONS
Pre-term Labor (<37 weeks):  Call your physician or return to the hospital if:  You have painless regular contractions more than 4 in one hour.  Your water breaks (remember time and color).  You have menstrual-like cramps, a low dull backache or pressure in your pelvis or back.  Your baby does not move enough to complete the daily kick count (10 movements in 2 hours).  Your baby moves much less often than on the days before or you have not felt your baby move all day.  Please review the MEDICATION LIST section of your AFTER VISIT SUMMARY document.  Take your medication as prescribed    Follow up at your next regular visit.  Take your medications tonight as discussed and throughout the following days until your appointment on Monday and discuss with your MD at that time.  Eat and drink regular and return to the hospital if your having signs of labor of if there is any bleeding or leaking.  Return to the hospital if you are having decreased fetal movements.

## 2024-02-16 NOTE — PROGRESS NOTES
"1104 - A  with EDC 4/6 making her 32.6 presents with c/o elevated BPs at home. Pt reports she took her 200mg labetalol this morning but then threw it up. Pt denies HA and epigastric pain. Pt reports she did \"see spots\" this morning when she vomited. Pt reports she takes 12 units of insulin at night for GDM and her BS was in the 70s this morning when she vomited.  1130 - Report to Dr Barahona, orders received. Pt may eat prior to PO meds. DS 85 reported to MD.  1300 - Report to VELASQUEZ Summers.  "

## 2024-02-16 NOTE — PROGRESS NOTES
1300  Report from Dayana ENG in room with pt at this time and pt understands that we are awaiting results at this time.  1410  Report to Dr. Barahona of lab results and orders for DC received at this time.  POC discussed with pt and she understands to go to her follow up appt on this coming Monday.  Pt questioned RN about how she has been seeing her BP increase at night and she is currently taking her medications at breakfast and dinner.  RN informed pt to take her medications at 09 and 21 and then she will get better effect of those.  Pt states understanding at this time.  Pt into regular clothing and to home with FOB at side via ambulation at 1420.

## 2024-02-17 NOTE — ED PROVIDER NOTES
DATE OF SERVICE:  2024     OB ED NOTE/NST READING     HISTORY OF PRESENT ILLNESS:  This is a private patient of Dr. Berman.  She is a   33-year-old female  2, para 1 at 32 and 6/7 weeks' gestational age.    She came into the hospital with complaints of an elevated blood pressure at   home.  The patient states that she took her standard 200 mg labetalol this   morning and then had an episode of vomiting where she thinks that she threw up   the tablet.  She did not have any epigastric pain or headache, but states   that she did see some spots while she was vomiting.  The patient has   gestational diabetic and woke up with a blood sugar of 70 this morning, which   might have been slightly on the low end for her and caused the vomiting   episode.  She checked her blood pressure at home and says that it was in the   160s range for the systolic.  She came to the hospital and her blood pressures   here were initially elevated.  We did give her nifedipine 10 mg tablet and   her blood pressures have resolved.  We repeated her pre-e labs which were done   four days ago after having mildly elevated blood pressure in the office.     PAST MEDICAL HISTORY:  Includes prediabetes.     PAST SURGICAL HISTORY:  Includes a  section in .     SOCIAL HISTORY:  Negative.     PHYSICAL EXAMINATION:  VITAL SIGNS:  Her original blood pressures when she got here were anywhere   from 147-158 systolics and 93/102 diastolic.  After nifedipine dose they have   been 118/79, 117/71, 124/80 with the left.  ABDOMEN:  Gravid.  EXTREMITIES:  No clubbing, cyanosis or edema.     NST has been category 1, reactive, in the 150s.  There are no uterine   contractions noted.     LABORATORY DATA:  Her [facility] labs were all within normal limits as they were 4   days ago.  The only difference is protein creatinine ratio that has gone from   95 to 191.     ASSESSMENT AND PLAN:  1.  This is a 33-year-old female  2, para 1 at 32 and 6/7  weeks'   gestational age.  2.  Gestational hypertension.  The patient will continue her 200 mg of   labetalol twice a day.  I believe her blood pressure elevation this morning   was most likely from vomiting her tablet.  She is going to be discharged home   in stable condition and we will follow up with her next scheduled appointment   in the office.        ______________________________  Corinne E. Capurro, MD CEC/JOVANNA    DD:  02/16/2024 14:22  DT:  02/16/2024 16:38    Job#:  [id number]

## 2024-02-19 ENCOUNTER — HOSPITAL ENCOUNTER (EMERGENCY)
Facility: MEDICAL CENTER | Age: 34
End: 2024-02-19
Attending: OBSTETRICS & GYNECOLOGY | Admitting: OBSTETRICS & GYNECOLOGY
Payer: COMMERCIAL

## 2024-02-19 VITALS
DIASTOLIC BLOOD PRESSURE: 75 MMHG | SYSTOLIC BLOOD PRESSURE: 129 MMHG | HEIGHT: 64 IN | WEIGHT: 172 LBS | BODY MASS INDEX: 29.37 KG/M2 | TEMPERATURE: 99 F | HEART RATE: 80 BPM

## 2024-02-19 DIAGNOSIS — O10.013 PRE-EXISTING ESSENTIAL HYPERTENSION DURING PREGNANCY IN THIRD TRIMESTER: ICD-10-CM

## 2024-02-19 LAB
ALBUMIN SERPL BCP-MCNC: 3.4 G/DL (ref 3.2–4.9)
ALBUMIN/GLOB SERPL: 1.1 G/DL
ALP SERPL-CCNC: 90 U/L (ref 30–99)
ALT SERPL-CCNC: 23 U/L (ref 2–50)
ANION GAP SERPL CALC-SCNC: 14 MMOL/L (ref 7–16)
AST SERPL-CCNC: 33 U/L (ref 12–45)
BASOPHILS # BLD AUTO: 0.3 % (ref 0–1.8)
BASOPHILS # BLD: 0.02 K/UL (ref 0–0.12)
BILIRUB SERPL-MCNC: 0.2 MG/DL (ref 0.1–1.5)
BUN SERPL-MCNC: 11 MG/DL (ref 8–22)
CALCIUM ALBUM COR SERPL-MCNC: 9.4 MG/DL (ref 8.5–10.5)
CALCIUM SERPL-MCNC: 8.9 MG/DL (ref 8.5–10.5)
CHLORIDE SERPL-SCNC: 102 MMOL/L (ref 96–112)
CO2 SERPL-SCNC: 18 MMOL/L (ref 20–33)
CREAT SERPL-MCNC: 0.77 MG/DL (ref 0.5–1.4)
CREAT UR-MCNC: 63.92 MG/DL
EOSINOPHIL # BLD AUTO: 0.15 K/UL (ref 0–0.51)
EOSINOPHIL NFR BLD: 2 % (ref 0–6.9)
ERYTHROCYTE [DISTWIDTH] IN BLOOD BY AUTOMATED COUNT: 39.7 FL (ref 35.9–50)
GFR SERPLBLD CREATININE-BSD FMLA CKD-EPI: 103 ML/MIN/1.73 M 2
GLOBULIN SER CALC-MCNC: 3 G/DL (ref 1.9–3.5)
GLUCOSE SERPL-MCNC: 125 MG/DL (ref 65–99)
HCT VFR BLD AUTO: 35.4 % (ref 37–47)
HGB BLD-MCNC: 12.2 G/DL (ref 12–16)
IMM GRANULOCYTES # BLD AUTO: 0.03 K/UL (ref 0–0.11)
IMM GRANULOCYTES NFR BLD AUTO: 0.4 % (ref 0–0.9)
LYMPHOCYTES # BLD AUTO: 2.01 K/UL (ref 1–4.8)
LYMPHOCYTES NFR BLD: 26.4 % (ref 22–41)
MCH RBC QN AUTO: 29.5 PG (ref 27–33)
MCHC RBC AUTO-ENTMCNC: 34.5 G/DL (ref 32.2–35.5)
MCV RBC AUTO: 85.7 FL (ref 81.4–97.8)
MONOCYTES # BLD AUTO: 0.55 K/UL (ref 0–0.85)
MONOCYTES NFR BLD AUTO: 7.2 % (ref 0–13.4)
NEUTROPHILS # BLD AUTO: 4.85 K/UL (ref 1.82–7.42)
NEUTROPHILS NFR BLD: 63.7 % (ref 44–72)
NRBC # BLD AUTO: 0 K/UL
NRBC BLD-RTO: 0 /100 WBC (ref 0–0.2)
PLATELET # BLD AUTO: 265 K/UL (ref 164–446)
PMV BLD AUTO: 9.5 FL (ref 9–12.9)
POTASSIUM SERPL-SCNC: 3.5 MMOL/L (ref 3.6–5.5)
PROT SERPL-MCNC: 6.4 G/DL (ref 6–8.2)
PROT UR-MCNC: 13 MG/DL (ref 0–15)
PROT/CREAT UR: 203 MG/G (ref 10–107)
RBC # BLD AUTO: 4.13 M/UL (ref 4.2–5.4)
SODIUM SERPL-SCNC: 134 MMOL/L (ref 135–145)
WBC # BLD AUTO: 7.6 K/UL (ref 4.8–10.8)

## 2024-02-19 PROCEDURE — 36415 COLL VENOUS BLD VENIPUNCTURE: CPT

## 2024-02-19 PROCEDURE — A9270 NON-COVERED ITEM OR SERVICE: HCPCS

## 2024-02-19 PROCEDURE — A9270 NON-COVERED ITEM OR SERVICE: HCPCS | Performed by: OBSTETRICS & GYNECOLOGY

## 2024-02-19 PROCEDURE — 99285 EMERGENCY DEPT VISIT HI MDM: CPT

## 2024-02-19 PROCEDURE — 80053 COMPREHEN METABOLIC PANEL: CPT

## 2024-02-19 PROCEDURE — 84156 ASSAY OF PROTEIN URINE: CPT

## 2024-02-19 PROCEDURE — 700102 HCHG RX REV CODE 250 W/ 637 OVERRIDE(OP)

## 2024-02-19 PROCEDURE — 59025 FETAL NON-STRESS TEST: CPT

## 2024-02-19 PROCEDURE — 85025 COMPLETE CBC W/AUTO DIFF WBC: CPT

## 2024-02-19 PROCEDURE — 700102 HCHG RX REV CODE 250 W/ 637 OVERRIDE(OP): Performed by: OBSTETRICS & GYNECOLOGY

## 2024-02-19 PROCEDURE — 82570 ASSAY OF URINE CREATININE: CPT

## 2024-02-19 RX ORDER — PRENATAL VIT 49/IRON FUM/FOLIC 6.75-0.2MG
TABLET ORAL
Status: ON HOLD | COMMUNITY
End: 2024-03-18

## 2024-02-19 RX ORDER — NIFEDIPINE 10 MG/1
CAPSULE ORAL
Status: COMPLETED
Start: 2024-02-19 | End: 2024-02-19

## 2024-02-19 RX ORDER — NIFEDIPINE 10 MG/1
10 CAPSULE ORAL ONCE
Status: COMPLETED | OUTPATIENT
Start: 2024-02-19 | End: 2024-02-19

## 2024-02-19 RX ORDER — LABETALOL 200 MG/1
400 TABLET, FILM COATED ORAL 2 TIMES DAILY
Qty: 120 TABLET | Refills: 0 | Status: ON HOLD | OUTPATIENT
Start: 2024-02-19 | End: 2024-03-09

## 2024-02-19 RX ORDER — LABETALOL 200 MG/1
200 TABLET, FILM COATED ORAL 2 TIMES DAILY
Status: ON HOLD | COMMUNITY
End: 2024-02-19

## 2024-02-19 RX ORDER — LABETALOL 100 MG/1
200 TABLET, FILM COATED ORAL TWICE DAILY
Status: DISCONTINUED | OUTPATIENT
Start: 2024-02-19 | End: 2024-02-19 | Stop reason: HOSPADM

## 2024-02-19 RX ORDER — INSULIN GLARGINE 100 [IU]/ML
12 INJECTION, SOLUTION SUBCUTANEOUS NIGHTLY
Status: ON HOLD | COMMUNITY
End: 2024-03-09

## 2024-02-19 RX ADMIN — NIFEDIPINE 10 MG: 10 CAPSULE ORAL at 18:26

## 2024-02-19 RX ADMIN — LABETALOL HYDROCHLORIDE 200 MG: 100 TABLET, FILM COATED ORAL at 18:47

## 2024-02-19 RX ADMIN — NIFEDIPINE 10 MG: 10 CAPSULE ORAL at 19:02

## 2024-02-19 ASSESSMENT — PAIN SCALES - GENERAL: PAINLEVEL: 0 - NO PAIN

## 2024-02-19 ASSESSMENT — FIBROSIS 4 INDEX: FIB4 SCORE: 0.72

## 2024-02-20 NOTE — DISCHARGE INSTRUCTIONS
Pre-term Labor (<37 weeks):  Call your physician or return to the hospital if:  You have painless regular contractions more than 4 in one hour.  Your water breaks (remember time and color).  You have menstrual-like cramps, a low dull backache or pressure in your pelvis or back.  Your baby does not move enough to complete the daily kick count (10 movements in 2 hours).  Your baby moves much less often than on the days before or you have not felt your baby move all day.  Please review the MEDICATION LIST section of your AFTER VISIT SUMMARY document.  Take your medication as prescribed    Hypertension During Pregnancy  Hypertension is also called high blood pressure. High blood pressure means that the force of the blood moving in your body is high enough to cause problems for you and your baby. Different types of high blood pressure can happen during pregnancy. The types are:  High blood pressure before you got pregnant. This is called chronic hypertension.  This can continue during your pregnancy. Your doctor will want to keep checking your blood pressure. You may need medicine to control your blood pressure while you are pregnant. You will need follow-up visits after you have your baby.  High blood pressure that goes up during pregnancy when it was normal before. This is called gestational hypertension. It will often get better after you have your baby, but your doctor will need to watch your blood pressure to make sure that it is getting better.  You may develop high blood pressure after giving birth. This is called postpartum hypertension. This often occurs within 48 hours after childbirth but may occur up to 6 weeks after giving birth.  Very high blood pressure during pregnancy is an emergency that needs treatment right away.  How does this affect me?  If you have high blood pressure during pregnancy, you have a higher chance of developing high blood pressure:  As you get older.  If you get pregnant again.  In  some cases, high blood pressure during pregnancy can cause:  Stroke.  Heart attack.  Damage to the kidneys, lungs, or liver.  Preeclampsia.  HELLP syndrome.  Seizures.  Problems with the placenta.  How does this affect my baby?  Your baby may:  Be born early.  Not weigh as much as he or she should.  Not handle labor well, leading to a .  This condition may also result in a baby's death before birth (stillbirth).  What are the risks?  Having high blood pressure during a past pregnancy.  Being overweight.  Being age 35 or older.  Being pregnant for the first time.  Being pregnant with more than one baby.  Becoming pregnant using fertility methods, such as IVF.  Having other problems, such as diabetes or kidney disease.  What can I do to lower my risk?    Keep a healthy weight.  Eat a healthy diet.  Follow what your doctor tells you about treating any medical problems that you had before you got pregnant.  It is very important to go to all of your doctor visits. Your doctor will check your blood pressure and make sure that your pregnancy is progressing as it should. Treatment should start early if a problem is found.  How is this treated?  Treatment for high blood pressure during pregnancy can vary. It depends on the type of high blood pressure you have and how serious it is.  If you were taking medicine for your blood pressure before you got pregnant, talk with your doctor. You may need to change the medicine during pregnancy if it is not safe for your baby.  If your blood pressure goes up during pregnancy, your doctor may order medicine to treat this.  If you are at risk for preeclampsia, your doctor may tell you to take a low-dose aspirin while you are pregnant.  If you have very high blood pressure, you may need to stay in the hospital so you and your baby can be watched closely. You may also need to take medicine to lower your blood pressure.  In some cases, if your condition gets worse, you may need to  have your baby early.  Follow these instructions at home:  Eating and drinking    Drink enough fluid to keep your pee (urine) pale yellow.  Avoid caffeine.  Lifestyle  Do not smoke or use any products that contain nicotine or tobacco. If you need help quitting, ask your doctor.  Do not use alcohol or drugs.  Avoid stress.  Rest and get plenty of sleep.  Regular exercise can help. Ask your doctor what kinds of exercise are best for you.  General instructions  Take over-the-counter and prescription medicines only as told by your doctor.  Keep all prenatal and follow-up visits.  Contact a doctor if:  You have symptoms that your doctor told you to watch for, such as:  Headaches.  A feeling like you may vomit (nausea).  Vomiting.  Belly (abdominal) pain.  Feeling dizzy or light-headed.  Get help right away if:  You have symptoms of serious problems, such as:  Very bad belly pain that does not get better with treatment.  A very bad headache that does not get better.  Blurry vision.  Double vision.  Vomiting that does not get better.  Sudden, fast weight gain.  Sudden swelling in your hands, ankles, or face.  Bleeding from your vagina.  Blood in your pee.  Shortness of breath.  Chest pain.  Weakness on one side of your body.  Trouble talking.  Your baby is not moving as much as usual.  These symptoms may be an emergency. Get help right away. Call your local emergency services (911 in the [place]  Do not wait to see if the symptoms will go away.  Do not drive yourself to the hospital.  Summary  High blood pressure is also called hypertension.  High blood pressure means that the force of the blood moving in your body is high enough to cause problems for you and your baby.  Get help right away if you have symptoms of serious problems due to high blood pressure.  Keep all prenatal and follow-up visits.  This information is not intended to replace advice given to you by your health care provider. Make sure you discuss any  questions you have with your health care provider.  Document Revised: 09/09/2021 Document Reviewed: 09/09/2021  Elsevier Patient Education © 2023 [employer]

## 2024-02-20 NOTE — PROGRESS NOTES
Dr. Mirza notified of elevated BP after nifedipine 10 mg IR PO and labetalol 200 mg. Orders for nifedipine 10 mg IR PO and to obtain IV access received. Report to VELASQUEZ Dobbins

## 2024-02-20 NOTE — ED PROVIDER NOTES
Date: 2024    Patient ID: 33-year-old  3 para 1-0-0-1 at 33+2 weeks by ultrasound (EDC 2024)    Chief complaint: Severe range blood pressures sent from [facility].    History of present illness: The patient has prenatal care with Dr. Berman.  Her pregnancy has been complicated by advanced maternal age, chronic hypertension, GDM A2 and a history of 1 prior  section.  She was seen at the high risk pregnancy center today for  monitoring and she reported that she ran out of her blood pressure medicine and has not taken it since last night.  Her blood pressures in the office were 200/100s 1 the patient presented to labor and delivery several hours later and her blood pressures were in the 190s over 110s.  She was given nifedipine IR 10 mg once.  She denies headaches or changes in vision.  She denies right upper quadrant pain.  Endorses positive fetal movement.  She denies vaginal bleeding or loss of fluid.  She denies contractions.    Past medical history: Prediabetes.    Past surgical history:  section x 1.    Medications: Prenatal vitamins and labetalol 200 mg p.o. twice daily.    Family history: Father with type 2 diabetes and sister with hypertension.    Social history: Denies tobacco use, alcohol use or drug use.  The patient's partner's name is William.    GYN history: Last menstrual period 2023.  The patient has a history of chlamydia.    OB history: G1: , primary  section at 37 weeks due to fetal breech malpresentation, female, 6 pounds 8 ounces, allergic to a compound in her neural axial anesthesia.    Allergies: No known drug allergies.      Physical exam:  Vital signs:  Vitals:    24   BP: 129/75   Pulse: 80   Temp:    General: Alert, conversational, pleasant, no acute distress  Cardiovascular: Regular rate  Pulmonary: No respiratory distress  Abdomen: Soft, nontender, nondistended, gravid  Genitourinary: Deferred  Extremities: Moves all, no  edema    Laboratory studies: Prenatal labs reviewed: Hepatitis B surface antigen nonreactive, RPR nonreactive, HIV nonreactive, A+, antibody negative, rubella immune, normal risk.    Preeclampsia labs: Potassium 3.5, creatinine 0.77, AST 33, ALT 23, protein creatinine ratio 203, hemoglobin 12.2, platelets 265    Imaging: Anatomy ultrasound report shows single intrauterine pregnancy with normal anatomy, growth ultrasound at 29 weeks showed an estimated fetal weight of 19th percentile, AC 26 percentile.    Assessment/plan:  33-year-old  3 para 1-0-1-1 at 33+2 weeks (EDC 2024)  1.   intrauterine pregnancy at 33+2 weeks.  2.  Chronic hypertension with severe range blood pressures  3.  Gestational diabetes A2  4.  Advanced maternal age  5.  History of prior  section due to fetal breech malpresentation.    Discussion: The patient was seen and evaluated at bedside.  She states that she took her last dose of labetalol 200 mg p.o. twice daily last night.  She reports that the [facility] pharmacy has her prescription ready but she was unable to pick it up prior to her visit with [facility] today.  Upon arrival her blood pressures were in the 190s over 100s.  She was given nifedipine 10 mg IR x 2 doses as well as her evening dose of labetalol 200 mg p.o. twice daily.  She has been monitored for several hours now and all of her blood pressures have been in the 120s to 130s over 60s to 90s.  I recommended increasing her labetalol to 400 mg p.o. twice daily.  She has an NST scheduled in our office tomorrow at 10 AM.  Preeclampsia precautions were discussed.  All of her preeclampsia labs were normal.    Plan:  1.  Okay to discharge home.  2.   labetalol 400 mg p.o. twice daily in the morning at [facility].  3.  Follow-up for NST tomorrow.  4.  Preeclampsia precautions discussed.    Pau Mirza MD

## 2024-02-20 NOTE — PROGRESS NOTES
Report received from Mikala ENG.     Pt obtained glucose reading 2 hours PP. Result 108.    1 Hermes TURNER at bedside. Order received to D/C home.      labor precautions and hypertension in pregnancy reviewed. Pt verbalized understanding of instruction. PIV removed. All questions and concerns addressed at this time. Pt ambulated off unit with belongings in hand in stable condition.

## 2024-02-20 NOTE — PROGRESS NOTES
EDC 2024 EGA 33.2    Pt presented to L&D from Dr. Murillo's office for elevated blood pressure. Pt is being seen by high risk for gestational diabetes and high blood pressure. Pt has not taken AM labetalol. Pt denies HA, visual disturbances, right upper quadrant pain. Pt denies UC's, LOF, vaginal bleeding, states + fetal movement. EFM and TOCO applied. BP elevated. Dr. Mirza notified of pt arrival and BP. Orders for nifedipine 10 mg PO IR and labetalol 200 mg PO 15 minutes later, and pre eclampsia labs received. Pt updated on above mentioned POC.

## 2024-03-04 ENCOUNTER — APPOINTMENT (OUTPATIENT)
Dept: ADMISSIONS | Facility: MEDICAL CENTER | Age: 34
End: 2024-03-04
Attending: OBSTETRICS & GYNECOLOGY
Payer: COMMERCIAL

## 2024-03-05 ENCOUNTER — ANESTHESIA (OUTPATIENT)
Dept: OBGYN | Facility: MEDICAL CENTER | Age: 34
End: 2024-03-05
Payer: COMMERCIAL

## 2024-03-05 ENCOUNTER — HOSPITAL ENCOUNTER (INPATIENT)
Facility: MEDICAL CENTER | Age: 34
LOS: 4 days | End: 2024-03-09
Attending: OBSTETRICS & GYNECOLOGY | Admitting: OBSTETRICS & GYNECOLOGY
Payer: COMMERCIAL

## 2024-03-05 ENCOUNTER — ANESTHESIA EVENT (OUTPATIENT)
Dept: OBGYN | Facility: MEDICAL CENTER | Age: 34
End: 2024-03-05
Payer: COMMERCIAL

## 2024-03-05 DIAGNOSIS — G89.18 POSTOPERATIVE PAIN: ICD-10-CM

## 2024-03-05 LAB
ABO GROUP BLD: NORMAL
ALBUMIN SERPL BCP-MCNC: 3.5 G/DL (ref 3.2–4.9)
ALBUMIN/GLOB SERPL: 1.1 G/DL
ALP SERPL-CCNC: 109 U/L (ref 30–99)
ALT SERPL-CCNC: 27 U/L (ref 2–50)
ANION GAP SERPL CALC-SCNC: 15 MMOL/L (ref 7–16)
AST SERPL-CCNC: 41 U/L (ref 12–45)
BILIRUB SERPL-MCNC: 0.2 MG/DL (ref 0.1–1.5)
BLD GP AB SCN SERPL QL: NORMAL
BUN SERPL-MCNC: 13 MG/DL (ref 8–22)
CALCIUM ALBUM COR SERPL-MCNC: 8.9 MG/DL (ref 8.5–10.5)
CALCIUM SERPL-MCNC: 8.5 MG/DL (ref 8.5–10.5)
CHLORIDE SERPL-SCNC: 104 MMOL/L (ref 96–112)
CO2 SERPL-SCNC: 18 MMOL/L (ref 20–33)
CREAT SERPL-MCNC: 0.64 MG/DL (ref 0.5–1.4)
CREAT UR-MCNC: 35.56 MG/DL
EKG IMPRESSION: NORMAL
ERYTHROCYTE [DISTWIDTH] IN BLOOD BY AUTOMATED COUNT: 37.5 FL (ref 35.9–50)
ERYTHROCYTE [DISTWIDTH] IN BLOOD BY AUTOMATED COUNT: 37.9 FL (ref 35.9–50)
GFR SERPLBLD CREATININE-BSD FMLA CKD-EPI: 118 ML/MIN/1.73 M 2
GLOBULIN SER CALC-MCNC: 3.3 G/DL (ref 1.9–3.5)
GLUCOSE BLD STRIP.AUTO-MCNC: 102 MG/DL (ref 65–99)
GLUCOSE SERPL-MCNC: 101 MG/DL (ref 65–99)
HCT VFR BLD AUTO: 32.5 % (ref 37–47)
HCT VFR BLD AUTO: 37.9 % (ref 37–47)
HGB BLD-MCNC: 11.6 G/DL (ref 12–16)
HGB BLD-MCNC: 13.7 G/DL (ref 12–16)
MAGNESIUM SERPL-MCNC: 1.8 MG/DL (ref 1.5–2.5)
MAGNESIUM SERPL-MCNC: 4.2 MG/DL (ref 1.5–2.5)
MAGNESIUM SERPL-MCNC: 6.3 MG/DL (ref 1.5–2.5)
MAGNESIUM SERPL-MCNC: 6.8 MG/DL (ref 1.5–2.5)
MCH RBC QN AUTO: 29.6 PG (ref 27–33)
MCH RBC QN AUTO: 30.3 PG (ref 27–33)
MCHC RBC AUTO-ENTMCNC: 35.7 G/DL (ref 32.2–35.5)
MCHC RBC AUTO-ENTMCNC: 36.1 G/DL (ref 32.2–35.5)
MCV RBC AUTO: 82.9 FL (ref 81.4–97.8)
MCV RBC AUTO: 83.8 FL (ref 81.4–97.8)
PATHOLOGY CONSULT NOTE: NORMAL
PLATELET # BLD AUTO: 261 K/UL (ref 164–446)
PLATELET # BLD AUTO: 297 K/UL (ref 164–446)
PMV BLD AUTO: 10.2 FL (ref 9–12.9)
PMV BLD AUTO: 10.2 FL (ref 9–12.9)
POTASSIUM SERPL-SCNC: 3.5 MMOL/L (ref 3.6–5.5)
PROT SERPL-MCNC: 6.8 G/DL (ref 6–8.2)
PROT UR-MCNC: 34 MG/DL (ref 0–15)
PROT/CREAT UR: 956 MG/G (ref 10–107)
RBC # BLD AUTO: 3.92 M/UL (ref 4.2–5.4)
RBC # BLD AUTO: 4.52 M/UL (ref 4.2–5.4)
RH BLD: NORMAL
SODIUM SERPL-SCNC: 137 MMOL/L (ref 135–145)
T PALLIDUM AB SER QL IA: NORMAL
TROPONIN T SERPL-MCNC: 6 NG/L (ref 6–19)
WBC # BLD AUTO: 16.4 K/UL (ref 4.8–10.8)
WBC # BLD AUTO: 9.5 K/UL (ref 4.8–10.8)

## 2024-03-05 PROCEDURE — A9270 NON-COVERED ITEM OR SERVICE: HCPCS | Performed by: OBSTETRICS & GYNECOLOGY

## 2024-03-05 PROCEDURE — 700105 HCHG RX REV CODE 258: Performed by: OBSTETRICS & GYNECOLOGY

## 2024-03-05 PROCEDURE — 83735 ASSAY OF MAGNESIUM: CPT

## 2024-03-05 PROCEDURE — 86780 TREPONEMA PALLIDUM: CPT

## 2024-03-05 PROCEDURE — 700105 HCHG RX REV CODE 258: Performed by: ANESTHESIOLOGY

## 2024-03-05 PROCEDURE — 160048 HCHG OR STATISTICAL LEVEL 1-5: Performed by: OBSTETRICS & GYNECOLOGY

## 2024-03-05 PROCEDURE — 700101 HCHG RX REV CODE 250: Performed by: ANESTHESIOLOGY

## 2024-03-05 PROCEDURE — 93010 ELECTROCARDIOGRAM REPORT: CPT | Performed by: INTERNAL MEDICINE

## 2024-03-05 PROCEDURE — 86901 BLOOD TYPING SEROLOGIC RH(D): CPT

## 2024-03-05 PROCEDURE — 700111 HCHG RX REV CODE 636 W/ 250 OVERRIDE (IP): Performed by: OBSTETRICS & GYNECOLOGY

## 2024-03-05 PROCEDURE — 160002 HCHG RECOVERY MINUTES (STAT): Performed by: OBSTETRICS & GYNECOLOGY

## 2024-03-05 PROCEDURE — 160009 HCHG ANES TIME/MIN: Performed by: OBSTETRICS & GYNECOLOGY

## 2024-03-05 PROCEDURE — 82570 ASSAY OF URINE CREATININE: CPT

## 2024-03-05 PROCEDURE — 36415 COLL VENOUS BLD VENIPUNCTURE: CPT

## 2024-03-05 PROCEDURE — 700102 HCHG RX REV CODE 250 W/ 637 OVERRIDE(OP)

## 2024-03-05 PROCEDURE — 84156 ASSAY OF PROTEIN URINE: CPT

## 2024-03-05 PROCEDURE — 160035 HCHG PACU - 1ST 60 MINS PHASE I: Performed by: OBSTETRICS & GYNECOLOGY

## 2024-03-05 PROCEDURE — 700111 HCHG RX REV CODE 636 W/ 250 OVERRIDE (IP): Performed by: ANESTHESIOLOGY

## 2024-03-05 PROCEDURE — 700102 HCHG RX REV CODE 250 W/ 637 OVERRIDE(OP): Performed by: ANESTHESIOLOGY

## 2024-03-05 PROCEDURE — 80053 COMPREHEN METABOLIC PANEL: CPT

## 2024-03-05 PROCEDURE — C1755 CATHETER, INTRASPINAL: HCPCS | Performed by: OBSTETRICS & GYNECOLOGY

## 2024-03-05 PROCEDURE — 700102 HCHG RX REV CODE 250 W/ 637 OVERRIDE(OP): Performed by: OBSTETRICS & GYNECOLOGY

## 2024-03-05 PROCEDURE — 86900 BLOOD TYPING SEROLOGIC ABO: CPT

## 2024-03-05 PROCEDURE — 160041 HCHG SURGERY MINUTES - EA ADDL 1 MIN LEVEL 4: Performed by: OBSTETRICS & GYNECOLOGY

## 2024-03-05 PROCEDURE — 770002 HCHG ROOM/CARE - OB PRIVATE (112)

## 2024-03-05 PROCEDURE — 82962 GLUCOSE BLOOD TEST: CPT

## 2024-03-05 PROCEDURE — 93005 ELECTROCARDIOGRAM TRACING: CPT | Performed by: OBSTETRICS & GYNECOLOGY

## 2024-03-05 PROCEDURE — 302449 STATCHG TRIAGE ONLY (STATISTIC)

## 2024-03-05 PROCEDURE — A9270 NON-COVERED ITEM OR SERVICE: HCPCS | Performed by: ANESTHESIOLOGY

## 2024-03-05 PROCEDURE — 85027 COMPLETE CBC AUTOMATED: CPT

## 2024-03-05 PROCEDURE — 303615 HCHG EPIDURAL/SPINAL ANESTHESIA FOR LABOR

## 2024-03-05 PROCEDURE — 86850 RBC ANTIBODY SCREEN: CPT

## 2024-03-05 PROCEDURE — 88307 TISSUE EXAM BY PATHOLOGIST: CPT

## 2024-03-05 PROCEDURE — 160029 HCHG SURGERY MINUTES - 1ST 30 MINS LEVEL 4: Performed by: OBSTETRICS & GYNECOLOGY

## 2024-03-05 PROCEDURE — A9270 NON-COVERED ITEM OR SERVICE: HCPCS

## 2024-03-05 PROCEDURE — 84484 ASSAY OF TROPONIN QUANT: CPT

## 2024-03-05 RX ORDER — ACETAMINOPHEN 500 MG
1000 TABLET ORAL EVERY 6 HOURS PRN
Status: DISCONTINUED | OUTPATIENT
Start: 2024-03-09 | End: 2024-03-09 | Stop reason: HOSPADM

## 2024-03-05 RX ORDER — TRANEXAMIC ACID 100 MG/ML
INJECTION, SOLUTION INTRAVENOUS PRN
Status: DISCONTINUED | OUTPATIENT
Start: 2024-03-05 | End: 2024-03-05 | Stop reason: SURG

## 2024-03-05 RX ORDER — DIPHENHYDRAMINE HYDROCHLORIDE 50 MG/ML
12.5 INJECTION INTRAMUSCULAR; INTRAVENOUS
Status: DISCONTINUED | OUTPATIENT
Start: 2024-03-05 | End: 2024-03-05

## 2024-03-05 RX ORDER — ACETAMINOPHEN 500 MG
1000 TABLET ORAL EVERY 8 HOURS
Status: COMPLETED | OUTPATIENT
Start: 2024-03-05 | End: 2024-03-06

## 2024-03-05 RX ORDER — MORPHINE SULFATE 0.5 MG/ML
INJECTION, SOLUTION EPIDURAL; INTRATHECAL; INTRAVENOUS
Status: COMPLETED | OUTPATIENT
Start: 2024-03-05 | End: 2024-03-05

## 2024-03-05 RX ORDER — OXYTOCIN 10 [USP'U]/ML
INJECTION, SOLUTION INTRAMUSCULAR; INTRAVENOUS PRN
Status: DISCONTINUED | OUTPATIENT
Start: 2024-03-05 | End: 2024-03-05 | Stop reason: SURG

## 2024-03-05 RX ORDER — SODIUM CHLORIDE, SODIUM GLUCONATE, SODIUM ACETATE, POTASSIUM CHLORIDE AND MAGNESIUM CHLORIDE 526; 502; 368; 37; 30 MG/100ML; MG/100ML; MG/100ML; MG/100ML; MG/100ML
1500 INJECTION, SOLUTION INTRAVENOUS ONCE
Status: DISCONTINUED | OUTPATIENT
Start: 2024-03-05 | End: 2024-03-05 | Stop reason: HOSPADM

## 2024-03-05 RX ORDER — SODIUM CHLORIDE, SODIUM LACTATE, POTASSIUM CHLORIDE, CALCIUM CHLORIDE 600; 310; 30; 20 MG/100ML; MG/100ML; MG/100ML; MG/100ML
INJECTION, SOLUTION INTRAVENOUS CONTINUOUS
Status: DISCONTINUED | OUTPATIENT
Start: 2024-03-05 | End: 2024-03-05

## 2024-03-05 RX ORDER — LABETALOL HYDROCHLORIDE 5 MG/ML
20-80 INJECTION, SOLUTION INTRAVENOUS
Status: DISCONTINUED | OUTPATIENT
Start: 2024-03-05 | End: 2024-03-05

## 2024-03-05 RX ORDER — HYDRALAZINE HYDROCHLORIDE 20 MG/ML
5 INJECTION INTRAMUSCULAR; INTRAVENOUS
Status: DISCONTINUED | OUTPATIENT
Start: 2024-03-05 | End: 2024-03-05

## 2024-03-05 RX ORDER — KETOROLAC TROMETHAMINE 15 MG/ML
INJECTION, SOLUTION INTRAMUSCULAR; INTRAVENOUS PRN
Status: DISCONTINUED | OUTPATIENT
Start: 2024-03-05 | End: 2024-03-05 | Stop reason: SURG

## 2024-03-05 RX ORDER — MAGNESIUM SULFATE HEPTAHYDRATE 40 MG/ML
2 INJECTION, SOLUTION INTRAVENOUS ONCE
Status: COMPLETED | OUTPATIENT
Start: 2024-03-05 | End: 2024-03-05

## 2024-03-05 RX ORDER — SODIUM CHLORIDE, SODIUM LACTATE, POTASSIUM CHLORIDE, CALCIUM CHLORIDE 600; 310; 30; 20 MG/100ML; MG/100ML; MG/100ML; MG/100ML
INJECTION, SOLUTION INTRAVENOUS PRN
Status: DISCONTINUED | OUTPATIENT
Start: 2024-03-05 | End: 2024-03-09 | Stop reason: HOSPADM

## 2024-03-05 RX ORDER — OXYCODONE HCL 5 MG/5 ML
10 SOLUTION, ORAL ORAL
Status: DISCONTINUED | OUTPATIENT
Start: 2024-03-05 | End: 2024-03-05

## 2024-03-05 RX ORDER — NIFEDIPINE 10 MG/1
10 CAPSULE ORAL ONCE
Status: COMPLETED | OUTPATIENT
Start: 2024-03-05 | End: 2024-03-05

## 2024-03-05 RX ORDER — CEFAZOLIN SODIUM 1 G/3ML
INJECTION, POWDER, FOR SOLUTION INTRAMUSCULAR; INTRAVENOUS PRN
Status: DISCONTINUED | OUTPATIENT
Start: 2024-03-05 | End: 2024-03-05 | Stop reason: SURG

## 2024-03-05 RX ORDER — HYDROMORPHONE HYDROCHLORIDE 1 MG/ML
0.2 INJECTION, SOLUTION INTRAMUSCULAR; INTRAVENOUS; SUBCUTANEOUS
Status: DISCONTINUED | OUTPATIENT
Start: 2024-03-05 | End: 2024-03-06

## 2024-03-05 RX ORDER — ACETAMINOPHEN 500 MG
1000 TABLET ORAL ONCE
Status: COMPLETED | OUTPATIENT
Start: 2024-03-05 | End: 2024-03-05

## 2024-03-05 RX ORDER — NIFEDIPINE 10 MG/1
10 CAPSULE ORAL
Status: DISCONTINUED | OUTPATIENT
Start: 2024-03-05 | End: 2024-03-05

## 2024-03-05 RX ORDER — KETOROLAC TROMETHAMINE 15 MG/ML
15 INJECTION, SOLUTION INTRAMUSCULAR; INTRAVENOUS EVERY 6 HOURS
Status: DISPENSED | OUTPATIENT
Start: 2024-03-05 | End: 2024-03-06

## 2024-03-05 RX ORDER — OXYCODONE HCL 5 MG/5 ML
5 SOLUTION, ORAL ORAL
Status: DISCONTINUED | OUTPATIENT
Start: 2024-03-05 | End: 2024-03-05

## 2024-03-05 RX ORDER — METOCLOPRAMIDE HYDROCHLORIDE 5 MG/ML
10 INJECTION INTRAMUSCULAR; INTRAVENOUS ONCE
Status: COMPLETED | OUTPATIENT
Start: 2024-03-05 | End: 2024-03-05

## 2024-03-05 RX ORDER — OXYCODONE HYDROCHLORIDE 5 MG/1
5 TABLET ORAL EVERY 4 HOURS PRN
Status: DISCONTINUED | OUTPATIENT
Start: 2024-03-05 | End: 2024-03-06

## 2024-03-05 RX ORDER — BUPIVACAINE HYDROCHLORIDE 7.5 MG/ML
INJECTION, SOLUTION INTRASPINAL
Status: COMPLETED | OUTPATIENT
Start: 2024-03-05 | End: 2024-03-05

## 2024-03-05 RX ORDER — MAGNESIUM SULFATE HEPTAHYDRATE 40 MG/ML
2 INJECTION, SOLUTION INTRAVENOUS ONCE
Status: DISCONTINUED | OUTPATIENT
Start: 2024-03-05 | End: 2024-03-05

## 2024-03-05 RX ORDER — CALCIUM GLUCONATE 94 MG/ML
1 INJECTION, SOLUTION INTRAVENOUS
Status: DISCONTINUED | OUTPATIENT
Start: 2024-03-05 | End: 2024-03-06

## 2024-03-05 RX ORDER — OXYCODONE HYDROCHLORIDE 5 MG/1
10 TABLET ORAL EVERY 4 HOURS PRN
Status: DISCONTINUED | OUTPATIENT
Start: 2024-03-06 | End: 2024-03-09 | Stop reason: HOSPADM

## 2024-03-05 RX ORDER — CALCIUM GLUCONATE 94 MG/ML
1 INJECTION, SOLUTION INTRAVENOUS
Status: DISCONTINUED | OUTPATIENT
Start: 2024-03-05 | End: 2024-03-05

## 2024-03-05 RX ORDER — ONDANSETRON 2 MG/ML
INJECTION INTRAMUSCULAR; INTRAVENOUS PRN
Status: DISCONTINUED | OUTPATIENT
Start: 2024-03-05 | End: 2024-03-05 | Stop reason: SURG

## 2024-03-05 RX ORDER — NIFEDIPINE 30 MG/1
30 TABLET, EXTENDED RELEASE ORAL NIGHTLY
Status: DISCONTINUED | OUTPATIENT
Start: 2024-03-05 | End: 2024-03-07

## 2024-03-05 RX ORDER — DEXAMETHASONE SODIUM PHOSPHATE 4 MG/ML
INJECTION, SOLUTION INTRA-ARTICULAR; INTRALESIONAL; INTRAMUSCULAR; INTRAVENOUS; SOFT TISSUE PRN
Status: DISCONTINUED | OUTPATIENT
Start: 2024-03-05 | End: 2024-03-05 | Stop reason: SURG

## 2024-03-05 RX ORDER — HYDROMORPHONE HYDROCHLORIDE 1 MG/ML
0.4 INJECTION, SOLUTION INTRAMUSCULAR; INTRAVENOUS; SUBCUTANEOUS
Status: DISCONTINUED | OUTPATIENT
Start: 2024-03-05 | End: 2024-03-05

## 2024-03-05 RX ORDER — CEFAZOLIN SODIUM 1 G/3ML
2 INJECTION, POWDER, FOR SOLUTION INTRAMUSCULAR; INTRAVENOUS ONCE
Status: DISCONTINUED | OUTPATIENT
Start: 2024-03-05 | End: 2024-03-05 | Stop reason: HOSPADM

## 2024-03-05 RX ORDER — NIFEDIPINE 10 MG/1
CAPSULE ORAL
Status: COMPLETED
Start: 2024-03-05 | End: 2024-03-05

## 2024-03-05 RX ORDER — OXYCODONE HYDROCHLORIDE 5 MG/1
10 TABLET ORAL EVERY 4 HOURS PRN
Status: DISCONTINUED | OUTPATIENT
Start: 2024-03-05 | End: 2024-03-06

## 2024-03-05 RX ORDER — ONDANSETRON 4 MG/1
4 TABLET, ORALLY DISINTEGRATING ORAL EVERY 6 HOURS PRN
Status: DISCONTINUED | OUTPATIENT
Start: 2024-03-06 | End: 2024-03-09 | Stop reason: HOSPADM

## 2024-03-05 RX ORDER — IBUPROFEN 800 MG/1
800 TABLET ORAL EVERY 8 HOURS
Qty: 9 TABLET | Refills: 0 | Status: COMPLETED | OUTPATIENT
Start: 2024-03-06 | End: 2024-03-09

## 2024-03-05 RX ORDER — ONDANSETRON 2 MG/ML
4 INJECTION INTRAMUSCULAR; INTRAVENOUS EVERY 6 HOURS PRN
Status: DISCONTINUED | OUTPATIENT
Start: 2024-03-06 | End: 2024-03-09 | Stop reason: HOSPADM

## 2024-03-05 RX ORDER — DIPHENHYDRAMINE HCL 25 MG
25 TABLET ORAL EVERY 6 HOURS PRN
Status: DISCONTINUED | OUTPATIENT
Start: 2024-03-06 | End: 2024-03-09 | Stop reason: HOSPADM

## 2024-03-05 RX ORDER — MAGNESIUM SULFATE HEPTAHYDRATE 40 MG/ML
4 INJECTION, SOLUTION INTRAVENOUS ONCE
Status: COMPLETED | OUTPATIENT
Start: 2024-03-05 | End: 2024-03-05

## 2024-03-05 RX ORDER — SODIUM CHLORIDE, SODIUM LACTATE, POTASSIUM CHLORIDE, CALCIUM CHLORIDE 600; 310; 30; 20 MG/100ML; MG/100ML; MG/100ML; MG/100ML
INJECTION, SOLUTION INTRAVENOUS ONCE
Status: COMPLETED | OUTPATIENT
Start: 2024-03-05 | End: 2024-03-05

## 2024-03-05 RX ORDER — EPHEDRINE SULFATE 50 MG/ML
5 INJECTION, SOLUTION INTRAVENOUS
Status: DISCONTINUED | OUTPATIENT
Start: 2024-03-05 | End: 2024-03-05

## 2024-03-05 RX ORDER — LABETALOL HYDROCHLORIDE 5 MG/ML
5 INJECTION, SOLUTION INTRAVENOUS
Status: DISCONTINUED | OUTPATIENT
Start: 2024-03-05 | End: 2024-03-05

## 2024-03-05 RX ORDER — OXYTOCIN 10 [USP'U]/ML
10 INJECTION, SOLUTION INTRAMUSCULAR; INTRAVENOUS
Status: DISCONTINUED | OUTPATIENT
Start: 2024-03-05 | End: 2024-03-09 | Stop reason: HOSPADM

## 2024-03-05 RX ORDER — HYDRALAZINE HYDROCHLORIDE 20 MG/ML
5-10 INJECTION INTRAMUSCULAR; INTRAVENOUS
Status: DISCONTINUED | OUTPATIENT
Start: 2024-03-05 | End: 2024-03-05

## 2024-03-05 RX ORDER — DIPHENHYDRAMINE HYDROCHLORIDE 50 MG/ML
12.5 INJECTION INTRAMUSCULAR; INTRAVENOUS EVERY 6 HOURS PRN
Status: DISCONTINUED | OUTPATIENT
Start: 2024-03-05 | End: 2024-03-06

## 2024-03-05 RX ORDER — HYDROMORPHONE HYDROCHLORIDE 1 MG/ML
0.2 INJECTION, SOLUTION INTRAMUSCULAR; INTRAVENOUS; SUBCUTANEOUS
Status: DISCONTINUED | OUTPATIENT
Start: 2024-03-05 | End: 2024-03-05

## 2024-03-05 RX ORDER — CITRIC ACID/SODIUM CITRATE 334-500MG
30 SOLUTION, ORAL ORAL ONCE
Status: COMPLETED | OUTPATIENT
Start: 2024-03-05 | End: 2024-03-05

## 2024-03-05 RX ORDER — DOCUSATE SODIUM 100 MG/1
100 CAPSULE, LIQUID FILLED ORAL 2 TIMES DAILY PRN
Status: DISCONTINUED | OUTPATIENT
Start: 2024-03-05 | End: 2024-03-09 | Stop reason: HOSPADM

## 2024-03-05 RX ORDER — ACETAMINOPHEN 500 MG
1000 TABLET ORAL EVERY 6 HOURS
Qty: 24 TABLET | Refills: 0 | Status: DISPENSED | OUTPATIENT
Start: 2024-03-06 | End: 2024-03-09

## 2024-03-05 RX ORDER — MAGNESIUM SULFATE HEPTAHYDRATE 40 MG/ML
2 INJECTION, SOLUTION INTRAVENOUS CONTINUOUS
Status: DISCONTINUED | OUTPATIENT
Start: 2024-03-05 | End: 2024-03-05

## 2024-03-05 RX ORDER — MAGNESIUM SULFATE HEPTAHYDRATE 40 MG/ML
4 INJECTION, SOLUTION INTRAVENOUS ONCE
Status: DISCONTINUED | OUTPATIENT
Start: 2024-03-05 | End: 2024-03-05

## 2024-03-05 RX ORDER — HYDROMORPHONE HYDROCHLORIDE 1 MG/ML
0.1 INJECTION, SOLUTION INTRAMUSCULAR; INTRAVENOUS; SUBCUTANEOUS
Status: DISCONTINUED | OUTPATIENT
Start: 2024-03-05 | End: 2024-03-05

## 2024-03-05 RX ORDER — HALOPERIDOL 5 MG/ML
1 INJECTION INTRAMUSCULAR
Status: DISCONTINUED | OUTPATIENT
Start: 2024-03-05 | End: 2024-03-05

## 2024-03-05 RX ORDER — MAGNESIUM SULFATE HEPTAHYDRATE 40 MG/ML
2 INJECTION, SOLUTION INTRAVENOUS CONTINUOUS
Status: DISPENSED | OUTPATIENT
Start: 2024-03-05 | End: 2024-03-06

## 2024-03-05 RX ORDER — OXYCODONE HYDROCHLORIDE 5 MG/1
5 TABLET ORAL EVERY 4 HOURS PRN
Status: DISCONTINUED | OUTPATIENT
Start: 2024-03-06 | End: 2024-03-09 | Stop reason: HOSPADM

## 2024-03-05 RX ORDER — DIPHENHYDRAMINE HYDROCHLORIDE 50 MG/ML
25 INJECTION INTRAMUSCULAR; INTRAVENOUS EVERY 6 HOURS PRN
Status: DISCONTINUED | OUTPATIENT
Start: 2024-03-06 | End: 2024-03-09 | Stop reason: HOSPADM

## 2024-03-05 RX ORDER — EPHEDRINE SULFATE 50 MG/ML
10 INJECTION, SOLUTION INTRAVENOUS
Status: DISCONTINUED | OUTPATIENT
Start: 2024-03-05 | End: 2024-03-06

## 2024-03-05 RX ORDER — SODIUM CHLORIDE, SODIUM GLUCONATE, SODIUM ACETATE, POTASSIUM CHLORIDE AND MAGNESIUM CHLORIDE 526; 502; 368; 37; 30 MG/100ML; MG/100ML; MG/100ML; MG/100ML; MG/100ML
INJECTION, SOLUTION INTRAVENOUS
Status: DISCONTINUED | OUTPATIENT
Start: 2024-03-05 | End: 2024-03-05 | Stop reason: SURG

## 2024-03-05 RX ORDER — VITAMIN A ACETATE, BETA CAROTENE, ASCORBIC ACID, CHOLECALCIFEROL, .ALPHA.-TOCOPHEROL ACETATE, DL-, THIAMINE MONONITRATE, RIBOFLAVIN, NIACINAMIDE, PYRIDOXINE HYDROCHLORIDE, FOLIC ACID, CYANOCOBALAMIN, CALCIUM CARBONATE, FERROUS FUMARATE, ZINC OXIDE, CUPRIC OXIDE 3080; 12; 120; 400; 1; 1.84; 3; 20; 22; 920; 25; 200; 27; 10; 2 [IU]/1; UG/1; MG/1; [IU]/1; MG/1; MG/1; MG/1; MG/1; MG/1; [IU]/1; MG/1; MG/1; MG/1; MG/1; MG/1
1 TABLET, FILM COATED ORAL
Status: DISCONTINUED | OUTPATIENT
Start: 2024-03-05 | End: 2024-03-09 | Stop reason: HOSPADM

## 2024-03-05 RX ORDER — NIFEDIPINE 30 MG/1
30 TABLET, EXTENDED RELEASE ORAL
Status: DISCONTINUED | OUTPATIENT
Start: 2024-03-05 | End: 2024-03-05

## 2024-03-05 RX ORDER — ONDANSETRON 2 MG/ML
4 INJECTION INTRAMUSCULAR; INTRAVENOUS EVERY 6 HOURS PRN
Status: DISCONTINUED | OUTPATIENT
Start: 2024-03-05 | End: 2024-03-06

## 2024-03-05 RX ORDER — IBUPROFEN 800 MG/1
800 TABLET ORAL EVERY 8 HOURS PRN
Status: DISCONTINUED | OUTPATIENT
Start: 2024-03-09 | End: 2024-03-09 | Stop reason: HOSPADM

## 2024-03-05 RX ADMIN — FENTANYL CITRATE 10 MCG: 50 INJECTION, SOLUTION INTRAMUSCULAR; INTRAVENOUS at 06:00

## 2024-03-05 RX ADMIN — MAGNESIUM SULFATE IN WATER 2 G/HR: 40 INJECTION, SOLUTION INTRAVENOUS at 21:56

## 2024-03-05 RX ADMIN — LABETALOL HYDROCHLORIDE 400 MG: 100 TABLET, FILM COATED ORAL at 07:09

## 2024-03-05 RX ADMIN — DEXAMETHASONE SODIUM PHOSPHATE 10 MG: 4 INJECTION INTRA-ARTICULAR; INTRALESIONAL; INTRAMUSCULAR; INTRAVENOUS; SOFT TISSUE at 06:17

## 2024-03-05 RX ADMIN — SODIUM CITRATE AND CITRIC ACID MONOHYDRATE 30 ML: 334; 500 SOLUTION ORAL at 05:43

## 2024-03-05 RX ADMIN — FAMOTIDINE 20 MG: 10 INJECTION, SOLUTION INTRAVENOUS at 05:43

## 2024-03-05 RX ADMIN — ACETAMINOPHEN 1000 MG: 500 TABLET ORAL at 15:39

## 2024-03-05 RX ADMIN — ONDANSETRON 8 MG: 2 INJECTION INTRAMUSCULAR; INTRAVENOUS at 06:17

## 2024-03-05 RX ADMIN — MAGNESIUM SULFATE IN WATER 2 G/HR: 40 INJECTION, SOLUTION INTRAVENOUS at 05:30

## 2024-03-05 RX ADMIN — LABETALOL HYDROCHLORIDE 400 MG: 100 TABLET, FILM COATED ORAL at 17:57

## 2024-03-05 RX ADMIN — KETOROLAC TROMETHAMINE 15 MG: 15 INJECTION, SOLUTION INTRAMUSCULAR; INTRAVENOUS at 06:53

## 2024-03-05 RX ADMIN — MAGNESIUM SULFATE IN WATER FOR 4 G: 40 INJECTION INTRAVENOUS at 04:55

## 2024-03-05 RX ADMIN — KETOROLAC TROMETHAMINE 15 MG: 15 INJECTION, SOLUTION INTRAMUSCULAR; INTRAVENOUS at 19:27

## 2024-03-05 RX ADMIN — MAGNESIUM SULFATE HEPTAHYDRATE 2 G: 2 INJECTION, SOLUTION INTRAVENOUS at 05:18

## 2024-03-05 RX ADMIN — OXYTOCIN 75 ML/HR: 10 INJECTION, SOLUTION INTRAMUSCULAR; INTRAVENOUS at 07:29

## 2024-03-05 RX ADMIN — OXYTOCIN 20 UNITS: 10 INJECTION, SOLUTION INTRAMUSCULAR; INTRAVENOUS at 06:17

## 2024-03-05 RX ADMIN — TRANEXAMIC ACID 1000 MG: 100 INJECTION, SOLUTION INTRAVENOUS at 06:22

## 2024-03-05 RX ADMIN — MORPHINE SULFATE 100 MCG: 0.5 INJECTION, SOLUTION EPIDURAL; INTRATHECAL; INTRAVENOUS at 06:00

## 2024-03-05 RX ADMIN — SODIUM CHLORIDE, POTASSIUM CHLORIDE, SODIUM LACTATE AND CALCIUM CHLORIDE: 600; 310; 30; 20 INJECTION, SOLUTION INTRAVENOUS at 09:19

## 2024-03-05 RX ADMIN — NIFEDIPINE 10 MG: 10 CAPSULE ORAL at 04:23

## 2024-03-05 RX ADMIN — NIFEDIPINE 30 MG: 30 TABLET, FILM COATED, EXTENDED RELEASE ORAL at 21:53

## 2024-03-05 RX ADMIN — KETOROLAC TROMETHAMINE 15 MG: 15 INJECTION, SOLUTION INTRAMUSCULAR; INTRAVENOUS at 13:02

## 2024-03-05 RX ADMIN — SODIUM CHLORIDE, SODIUM GLUCONATE, SODIUM ACETATE, POTASSIUM CHLORIDE AND MAGNESIUM CHLORIDE: 526; 502; 368; 37; 30 INJECTION, SOLUTION INTRAVENOUS at 05:53

## 2024-03-05 RX ADMIN — SODIUM CHLORIDE, POTASSIUM CHLORIDE, SODIUM LACTATE AND CALCIUM CHLORIDE: 600; 310; 30; 20 INJECTION, SOLUTION INTRAVENOUS at 23:17

## 2024-03-05 RX ADMIN — CEFAZOLIN 2 G: 1 INJECTION, POWDER, FOR SOLUTION INTRAMUSCULAR; INTRAVENOUS at 06:02

## 2024-03-05 RX ADMIN — NIFEDIPINE 10 MG: 10 CAPSULE ORAL at 08:02

## 2024-03-05 RX ADMIN — ACETAMINOPHEN 1000 MG: 500 TABLET ORAL at 07:09

## 2024-03-05 RX ADMIN — BUPIVACAINE HYDROCHLORIDE IN DEXTROSE 1.5 ML: 7.5 INJECTION, SOLUTION SUBARACHNOID at 06:01

## 2024-03-05 RX ADMIN — METOCLOPRAMIDE 10 MG: 5 INJECTION, SOLUTION INTRAMUSCULAR; INTRAVENOUS at 05:43

## 2024-03-05 RX ADMIN — OXYCODONE 5 MG: 5 TABLET ORAL at 10:24

## 2024-03-05 RX ADMIN — SODIUM CHLORIDE, POTASSIUM CHLORIDE, SODIUM LACTATE AND CALCIUM CHLORIDE: 600; 310; 30; 20 INJECTION, SOLUTION INTRAVENOUS at 04:54

## 2024-03-05 RX ADMIN — ACETAMINOPHEN 1000 MG: 500 TABLET ORAL at 23:29

## 2024-03-05 RX ADMIN — NIFEDIPINE 10 MG: 10 CAPSULE ORAL at 08:31

## 2024-03-05 ASSESSMENT — LIFESTYLE VARIABLES
HOW MANY TIMES IN THE PAST YEAR HAVE YOU HAD 5 OR MORE DRINKS IN A DAY: 0
ALCOHOL_USE: NO
TOTAL SCORE: 0
EVER_SMOKED: NEVER
EVER FELT BAD OR GUILTY ABOUT YOUR DRINKING: NO
DOES PATIENT WANT TO STOP DRINKING: NO
ON A TYPICAL DAY WHEN YOU DRINK ALCOHOL HOW MANY DRINKS DO YOU HAVE: 0
AVERAGE NUMBER OF DAYS PER WEEK YOU HAVE A DRINK CONTAINING ALCOHOL: 0
EVER HAD A DRINK FIRST THING IN THE MORNING TO STEADY YOUR NERVES TO GET RID OF A HANGOVER: NO
TOTAL SCORE: 0
TOTAL SCORE: 0
CONSUMPTION TOTAL: NEGATIVE
HAVE PEOPLE ANNOYED YOU BY CRITICIZING YOUR DRINKING: NO
HAVE YOU EVER FELT YOU SHOULD CUT DOWN ON YOUR DRINKING: NO

## 2024-03-05 ASSESSMENT — EDINBURGH POSTNATAL DEPRESSION SCALE (EPDS)
I HAVE BEEN SO UNHAPPY THAT I HAVE BEEN CRYING: ONLY OCCASIONALLY
I HAVE FELT SCARED OR PANICKY FOR NO GOOD REASON: YES, SOMETIMES
I HAVE BLAMED MYSELF UNNECESSARILY WHEN THINGS WENT WRONG: NO, NEVER
THINGS HAVE BEEN GETTING ON TOP OF ME: NO, MOST OF THE TIME I HAVE COPED QUITE WELL
I HAVE BEEN SO UNHAPPY THAT I HAVE HAD DIFFICULTY SLEEPING: NOT AT ALL
I HAVE BEEN ANXIOUS OR WORRIED FOR NO GOOD REASON: YES, SOMETIMES
I HAVE BEEN ABLE TO LAUGH AND SEE THE FUNNY SIDE OF THINGS: AS MUCH AS I ALWAYS COULD
THE THOUGHT OF HARMING MYSELF HAS OCCURRED TO ME: NEVER
I HAVE FELT SAD OR MISERABLE: NO, NOT AT ALL
I HAVE LOOKED FORWARD WITH ENJOYMENT TO THINGS: AS MUCH AS I EVER DID

## 2024-03-05 ASSESSMENT — PAIN DESCRIPTION - PAIN TYPE
TYPE: ACUTE PAIN
TYPE: ACUTE PAIN;SURGICAL PAIN
TYPE: ACUTE PAIN
TYPE: ACUTE PAIN;SURGICAL PAIN
TYPE: ACUTE PAIN
TYPE: ACUTE PAIN
TYPE: ACUTE PAIN;SURGICAL PAIN

## 2024-03-05 ASSESSMENT — PATIENT HEALTH QUESTIONNAIRE - PHQ9
2. FEELING DOWN, DEPRESSED, IRRITABLE, OR HOPELESS: NOT AT ALL
1. LITTLE INTEREST OR PLEASURE IN DOING THINGS: NOT AT ALL
SUM OF ALL RESPONSES TO PHQ9 QUESTIONS 1 AND 2: 0

## 2024-03-05 ASSESSMENT — FIBROSIS 4 INDEX: FIB4 SCORE: 0.88

## 2024-03-05 NOTE — PROGRESS NOTES
0515: Report from Faye SMALL RN     0551: Pt transferred from [facility]3 to OR2 via gurney     0616: Viable female infant delivered via  section   APGARs 7/7    0700: Transfer pt from OR2 to PACU3     0702: Bedside report from Dr. Reeves     0759: Dr. Pederson notified of severe range pressures persisting post labetalol dose at 0709. MD order for administration of 10 mg IR nifedipine- see MAR for administration   Orders to move 30 mg dose nifedipine XL to this evening     0828: Dr. Pederson notified of BP severe range post nifedipine IR dose, orders to give another IR 10 mg nifedipine dose     0841: Dr. Pederson at bedside to see pt   If 2x severe range BP readings within the hour, administer hydralazine per HTN protocol     0930: Pt transferred to antepartum room    0945: Report to Silvia CASTRO RN

## 2024-03-05 NOTE — PROGRESS NOTES
0945 Report received from VELASQUEZ Jerry. Care assumed at this time.     4604 Dr. Pederson updated by RN of patient status. Orders received to transfer patient to postpartum department.     1530 Patient transferred via hospital bed in apparent stable condition with all possession and baby in bassinet to postpartum department. Report given to VELASQUEZ Kumar. Bands and cuddles verified. Transfer of care at this time.

## 2024-03-05 NOTE — ANESTHESIA POSTPROCEDURE EVALUATION
Patient: Shara Patricia    Procedure Summary       Date: 24 Room / Location: [facility] OR 02 / SURGERY LABOR AND DELIVERY    Anesthesia Start: 553 Anesthesia Stop: 710    Procedure: REPEAT  SECTION Diagnosis:       Status post repeat low transverse  section      (HISTORY OF  SECTION, CHRONIC HYPERTENSION- 36.3 WEEKS)    Surgeons: Lynette Shah M.D. Responsible Provider: Poonam Reeves M.D.    Anesthesia Type: spinal ASA Status: 3 - Emergent            Final Anesthesia Type: spinal  Last vitals  BP   Blood Pressure: (!) 164/99 (see MAR for meds administered at 709)    Temp   36.3 °C (97.4 °F)    Pulse   74   Resp   18    SpO2   98 %      Anesthesia Post Evaluation    No notable events documented.     Nurse Pain Score: 0 (NPRS)

## 2024-03-05 NOTE — ANESTHESIA PROCEDURE NOTES
Spinal Block    Date/Time: 3/5/2024 6:00 AM    Performed by: Poonam Reeves M.D.  Authorized by: Poonam Reeves M.D.    Start Time:  3/5/2024 6:00 AM  End Time:  3/5/2024 6:01 AM  Reason for Block: primary anesthetic    patient identified, IV checked, site marked, risks and benefits discussed, surgical consent, monitors and equipment checked, pre-op evaluation and timeout performed    Patient Position:  Sitting  Prep: ChloraPrep, patient draped and sterile technique    Monitoring:  Blood pressure, continuous pulse oximetry and heart rate  Approach:  Midline  Location:  L3-4  Injection Technique:  Single-shot  Skin infiltration:  Lidocaine  Strength:  1%  Dose:  4ml  Needle Type:  Pencan  Needle Gauge:  25 G  CSF flowing pre/post injection:  Yes  Sensory Level:  T4

## 2024-03-05 NOTE — ANESTHESIA TIME REPORT
Anesthesia Start and Stop Event Times       Date Time Event    3/5/2024 0536 Ready for Procedure     0553 Anesthesia Start     0710 Anesthesia Stop          Responsible Staff  03/05/24      Name Role Begin End    Poonam Reeves M.D. Anesth 0553 0710          Overtime Reason:  overtime    Comments: OT after 7am

## 2024-03-05 NOTE — ANESTHESIA PREPROCEDURE EVALUATION
Case: [id number] Date/Time: 24    Procedure: REPEAT  SECTION    Pre-op diagnosis: HISTORY OF  SECTION, CHRONIC HYPERTENSION- 36.3 WEEKS    Location: D OR  / SURGERY LABOR AND DELIVERY    Surgeons: Lynette Shah M.D.          34yo  at 35 3/7 weeks, here with PreE with severe features with SBPS 190s, and hx of CS x1, A2GDM, IUGR    To OR for emergent CS    Of note, had hives during her last CS, no idea what caused it, if anything specific    Relevant Problems   CARDIAC   (positive) Essential hypertension   (positive) Pre-eclampsia affecting childbirth      OB   (positive) Pre-eclampsia affecting childbirth      Other   (positive) Snoring       Physical Exam    Airway   Mallampati: II  TM distance: >3 FB  Neck ROM: full       Cardiovascular - normal exam  Rhythm: regular  Rate: normal  (-) murmur     Dental - normal exam           Pulmonary - normal exam  Breath sounds clear to auscultation     Abdominal    Neurological - normal exam                   Anesthesia Plan    ASA 3- EMERGENT   ASA physical status 3 criteria: preeclampsia with severe featuresASA physical status emergent criteria: compromised vital organ, limb or tissue, neurologic compromise requiring immediate intervention and non-reassuring fetal heart tones    Plan - spinal   Neuraxial block will be primary anesthetic                Postoperative Plan: Postoperative administration of opioids is intended.    Pertinent diagnostic labs and testing reviewed    Informed Consent:    Anesthetic plan and risks discussed with patient.    Use of blood products discussed with: patient whom consented to blood products.

## 2024-03-05 NOTE — OP REPORT
Operative Report  24      PreOp Diagnosis:   SIUP @ 35w3d  Chronic hypertension with superimposed preE with severe features  A2GDM  Fetal growth restriction      PostOp Diagnosis:   Chronic hypertension with superimposed preE with severe features  2. S/p repeat LTCS    Procedure(s):  REPEAT Low transverse  SECTION    Surgeon(s):  PLACIDO Youssef M.D.    Anesthesiologist/Type of Anesthesia:  Anesthesiologist: Poonam Reeves M.D./* No anesthesia type entered *    Surgical Staff:  Circulator: Claritza Asif R.N.  Scrub Person: Piedad Whitaker  L&D Circulator Assistant: Faye Hoskins R.N.  L&MILY Baby  Nurse: Anisa Slade R.N.    Indication: 33-year-old -0-0-1 presented at 35 weeks 3 days with blood pressures up to 200/110.  She was given antihypertensives to bring this down and started on magnesium sulfate for seizure prophylaxis there was taken for repeat  delivery secondary to preeclampsia with severe features by blood pressure..    Estimated Blood Loss: 600cc  IVF: see anesthesia record  UOP: 300cc    Findings: normal uterus, tubes, and ovaries.  Mild adhesions of the peritoneal cavity (omentum to the anterior abdominal wall).  Viable female infant, vertex presentation, 2210g, APGARs 7/7    Complications: none    Procedure in Detail:  The pt ws taken to the operating room where spinal anesthesia was placed and found to be adequate.  The patient was prepped and draped in a normal supine position with a wedge under the right hip.  A pfannensteil incision through her prior incision was made and taken down to the fascia with the scalpel, which was incised bilaterally with the scalpel.  The fascial incision was extended laterally with the Velazquez scissors.  The rectus muscles were dissected off the rectus sheath with Velazquez scissors superiorly.  There was separation of the rectus muscles superiorly and the peritoneum was elevated with a hemostat  and entered with Velazquez scissors.  The peritoneal incision was extended with blunt stretching and Velazquez scissors, staying well above the bladder.  The bladder blade was placed.  A bladder flap was developed with Metzenbaum scissors.  The lower uterine segment was incised transversely and the endometrial cavity entered with a blunt finger, clear fluid was noted.  The hysterotomy was extended with cephalad-caudad stretching.  The infant's head was manually elevated to the level of the hysterotomy and delivered, followed atraumatically by the anterior shoulder, posterior shoulder and body with help of fundal pressure.  The infant was stimulated, bulb suctioned, and cord clamping was delayed x 30 seconds then the cord was clamped x2, cut and the infant handed to nurse.  Cord gases were sent.  The placenta delivered with gentle cord traction and uterine massage and was noted to be intact with 3-vessel cord. The uterus was exteriorized, cleared of all clots and debris and closed in running, locked fashion with 0-Monocryl.  Several additional figures-of-eight were placed across the midline with resulting hemostasis noted.  The uterus was returned to the abdomen, the gutters cleared of all clots and debris and the hysterotomy re-examined and noted to be hemostatic.  The rectus muscles were inspected, there was bleeding found on the underside of the right rectus, this was oversewn with 2-0 Monocryl with resulting hemostasis noted.   The fascia was closed with 0-PDS.  The subcutaneous tissues was irrigated. Bleeders were coagulated with the bovie and ablator and the left ankle required a figure of 8 place with 2-0 Monocryl.  The subcutaneous tissues were approximated with running 2-0 plain and the skin with subcuticular suture with 4-0 monocryl.  A Mepilex dressing was placed followed by pressure dressing.  All counts were correct x3.  Pt went to recovery in good condition.  Infant went to NICU    Lynette Shah,  MD  [facility]

## 2024-03-05 NOTE — PROGRESS NOTES
Late entry d/t pt care    33 y.o.  EDC  (35.3 wks), GBS unk    Pt presents to L&D c/o elevated Bps at home and some chest pain. Pt states that she hasn't been feeling weel most of the day. She took her BP early this morning and it was 190s/110s. Pt has a Hx of Pre-E with this pregnancy and is schedule for a repeat  next Tuesday.  Pt takes labetalol 400 mg BID and last took last night at 2100. She last ate at around 2000. Reports good FM. Denies VB/LOF/ctxs. Denies any HA or vision changes. EFM/TOCO applied.     0413: /117.     0416: Dr. Reinoso notified of pt's arrival and current status, orders received for Nifedipine IR 10 mg STAT.    0425:Dr. Reinoso at bedside discussing POC with pt. Orders received to start mag and to admit for , see MAR for orders.    0455: Mag bolus started per protocol.    Report given to Claritza GARCIA RN.

## 2024-03-05 NOTE — H&P
OB H&P:    CC: elevated BP, not feeling well    HPI:  Ms. Shara AMADOR Am Is is a 33 y.o.  @ 35w3d by 8wk US with cHTN, A2GDM, fetal growth restriction, hx of c/s x1 presenting feeling shaky, elevated blood pressures to 190 systolic at home, as well as epigastric pain agents as pressure..  Chronic hypertension, elevated blood pressures noted since prior to 20 weeks was started on labetalol at 19 weeks initially 100 mg twice daily then increase to January to 200 mg twice daily.  On  she was seen here in triage with blood pressures 190s over 110s.  She responded well to immediate release nifedipine x 2 and increase p.o. labetalol and was discharged home on labetalol 400 mg twice daily.  She reports that she has been taking that, last dose at 9 PM.  Reports her blood pressures recently have been elevated to mild range even with her recent dose increase.  Denies headache.  Reports this morning she started having epigastric pain that she is encasement of towards her left chest.  Denies vision changes.  Denies contractions/loss of fluid/vaginal bleeding.  Reports positive fetal movement.  Patient is on Lantus 12 units at [facility] and took this last night.  Patient reports she developed hives after her last spinal.      [facility] with [facility], Dr Berman.  Also follows with the [facility] pregnancy [facility].  Fetal growth restriction noted on last growth ultrasound at 32 weeks.  EFW 8%, AC 6%.  Has had normal umbilical artery Dopplers    PNL:  Rh+/-, RI, HIV neg, RPR NR, HBsAg NR, GC/CT neg/neg  1hr 139 --> elevatdd 3hr    ROS:  Const: denies fevers, general concerns  CV/resp: see HPI  GI: denies abd pain, GI concerns  : see HPI  Neuro: see HPI      OB History    Para Term  AB Living   3 1 1     1   SAB IAB Ectopic Molar Multiple Live Births             1      # Outcome Date GA Lbr Augie/2nd Weight Sex Delivery Anes PTL Lv   3 Current            2 Term 12 37w0d  3.005 kg (6 lb 10 oz) F CS-LTranv  "Spinal  DANIELA      Birth Comments: C/S due to unable to progress, and abrupted placenta.   1                 GYN: History of chlamydia, no cervical procedures    Past medical history: Chronic hypertension diagnosed in this pregnancy        Past Surgical History:   Procedure Laterality Date    PRIMARY C SECTION  3/13/2012    Performed by JOEL JONES at [facility]       Medications:   Prenatal vitamin  Aspirin 81 mg daily  Lantus 12 units nightly  Labetalol 400 twice daily.      Family History   Problem Relation Age of Onset    Diabetes Father         Insulin dependent    Hypertension Sister     Diabetes Maternal Grandmother 50        insulin dependent    Cancer Neg Hx     Heart Disease Neg Hx     Stroke Neg Hx        Social History     Socioeconomic History    Marital status: Single   Tobacco Use    Smoking status: Never    Smokeless tobacco: Never   Vaping Use    Vaping Use: Never used   Substance and Sexual Activity    Alcohol use: Yes     Comment: 3 drinks every few months    Drug use: No    Sexual activity: Yes     Partners: Male     Birth control/protection: Pill     Comment: 8-year old       PE:  Vitals:    24 0400 24 0413 24 0426   BP:  (!) 209/117 (!) 201/117   Pulse:  63 69   Weight: 78.9 kg (174 lb)     Height: 1.626 m (5' 4\")       gen: AAO, NAD  CV: RRR  Resp; ctab  abd: soft, gravid, mild tenderness epigastric regions, L>F  Ext: NT, 1+ edema bilaterally; 2+ DTRs, no clonus    FHT: 140/moderate variability/+ accels/ no decels  cristi:few irregular ctx    A/P: 33 y.o.  @ 35w3d by 8wk US with chronic hypertension, superimposed preeclampsia, A2 GDM, fetal growth restriction, history of  times she is growth restricted    - Bps improved with nifedipine 10mg IR daily x1, cont to control severe hypertension as needed, magnesium sulfate now for seizure prophylaxis.  -Accu-Chek now  -Troponin sent, EKG being done now given her report of chest pressure  -Recommend we " move forward with delivery now given her gestational age, plan had been for delivery at 36 weeks once fetal growth restriction was also diagnosed.  Discussed that he may need may need some help including in the NICU but given her preeclampsia delivery is indicated    I discussed w/ pt risks of surgery including pain, bleeding, infection, risk of damage to intraabdominal structures including bowel/bladder/ureters.  Pt confirms she is accepting of blood transfusion in case of emergency.  Reviewed risks of transfusion including transfusion reaction, risk of exposure to infectious disease including HIV and hepatitis.  All questions answered.        Lynette Shah MD  [facility]

## 2024-03-06 LAB — MAGNESIUM SERPL-MCNC: 6.4 MG/DL (ref 1.5–2.5)

## 2024-03-06 PROCEDURE — 770002 HCHG ROOM/CARE - OB PRIVATE (112)

## 2024-03-06 PROCEDURE — 83735 ASSAY OF MAGNESIUM: CPT

## 2024-03-06 PROCEDURE — 36415 COLL VENOUS BLD VENIPUNCTURE: CPT

## 2024-03-06 PROCEDURE — A9270 NON-COVERED ITEM OR SERVICE: HCPCS | Performed by: OBSTETRICS & GYNECOLOGY

## 2024-03-06 PROCEDURE — 700102 HCHG RX REV CODE 250 W/ 637 OVERRIDE(OP): Performed by: OBSTETRICS & GYNECOLOGY

## 2024-03-06 PROCEDURE — 700102 HCHG RX REV CODE 250 W/ 637 OVERRIDE(OP): Performed by: ANESTHESIOLOGY

## 2024-03-06 PROCEDURE — A9270 NON-COVERED ITEM OR SERVICE: HCPCS | Performed by: ANESTHESIOLOGY

## 2024-03-06 PROCEDURE — 700111 HCHG RX REV CODE 636 W/ 250 OVERRIDE (IP): Performed by: ANESTHESIOLOGY

## 2024-03-06 RX ADMIN — IBUPROFEN 800 MG: 800 TABLET, FILM COATED ORAL at 13:48

## 2024-03-06 RX ADMIN — ACETAMINOPHEN 1000 MG: 500 TABLET ORAL at 06:45

## 2024-03-06 RX ADMIN — LABETALOL HYDROCHLORIDE 400 MG: 100 TABLET, FILM COATED ORAL at 06:45

## 2024-03-06 RX ADMIN — ACETAMINOPHEN 1000 MG: 500 TABLET, FILM COATED ORAL at 20:15

## 2024-03-06 RX ADMIN — KETOROLAC TROMETHAMINE 15 MG: 15 INJECTION, SOLUTION INTRAMUSCULAR; INTRAVENOUS at 06:45

## 2024-03-06 RX ADMIN — OXYCODONE 5 MG: 5 TABLET ORAL at 08:58

## 2024-03-06 RX ADMIN — OXYCODONE 5 MG: 5 TABLET ORAL at 14:31

## 2024-03-06 RX ADMIN — LABETALOL HYDROCHLORIDE 400 MG: 100 TABLET, FILM COATED ORAL at 17:52

## 2024-03-06 RX ADMIN — NIFEDIPINE 30 MG: 30 TABLET, FILM COATED, EXTENDED RELEASE ORAL at 20:16

## 2024-03-06 RX ADMIN — OXYCODONE 10 MG: 5 TABLET ORAL at 20:16

## 2024-03-06 RX ADMIN — ACETAMINOPHEN 1000 MG: 500 TABLET, FILM COATED ORAL at 13:48

## 2024-03-06 RX ADMIN — PRENATAL WITH FERROUS FUM AND FOLIC ACID 1 TABLET: 3080; 920; 120; 400; 22; 1.84; 3; 20; 10; 1; 12; 200; 27; 25; 2 TABLET ORAL at 08:58

## 2024-03-06 RX ADMIN — IBUPROFEN 800 MG: 800 TABLET, FILM COATED ORAL at 22:45

## 2024-03-06 ASSESSMENT — PAIN DESCRIPTION - PAIN TYPE
TYPE: SURGICAL PAIN
TYPE: ACUTE PAIN
TYPE: SURGICAL PAIN

## 2024-03-06 NOTE — CARE PLAN
The patient is Watcher - Medium risk of patient condition declining or worsening    Shift Goals  Clinical Goals: fundus frim, lochia WDL, BPWDL  Patient Goals: Healthy mom, healthy baby  Family Goals: Support    Progress made toward(s) clinical / shift goals:    Problem: Altered Physiologic Condition  Goal: Patient physiologically stable as evidenced by normal lochia, palpable uterine involution and vitals within normal limits  Outcome: Progressing  Note: Fundus firm and midline. Lochia light, rubra. Vitals within defined limits     Problem: Respiratory/Oxygenation Function Post-Surgical  Goal: Patient will achieve/maintain normal respiratory rate/effort  Outcome: Progressing  Note: Patient vitals WDL, color appropriate for ethnicity, no cyanosis or s/s of respiratory distress present. Lung fields clear       Patient is not progressing towards the following goals:

## 2024-03-06 NOTE — PROGRESS NOTES
Assessment done. Vital signs stable. claims to be passing flatus. Fundus firm at umbilicus with light lochia. Dressing over low abdominal incision clean, dry, and intact. Pumping for infant. Family at bedside. Patient encouraged to call for any needs.

## 2024-03-06 NOTE — PROGRESS NOTES
1530    Patient transferred to postpartum unit at 1519. Received report from VELASQUEZ Vega. Orieneted patient to room and discussed POC for MOB and infant. Patient was assessed, fundus firm, lochia light, rubra. Call light within reach, bed in lowest position. Patient denies any needs at this time. Encouraged to call if any needs arise.    2030    Continuous 2 g/hr mag sulfate infusion order was discontinued in error. Notified MD Pederson, mag sulfate was reordered for 2 g/hr and scheduled to discontinue 3/6/24 at 0645.

## 2024-03-06 NOTE — PROGRESS NOTES
"OB Post Operative Note    Feeling very tired this morning.  She is looking forward to the magnesium stopping.  She denies headaches, visual disturbances, upper abdominal pain, or significant lower extremity swelling.  She denies chest pain or shortness of breath.  She has some pain with her incision but she has not moved around very much while on the magnesium yet.  Her catheter is still in place.  She denies flatus yet.  She is tolerating p.o.  She is working on breast-feeding but believes she will need help with lactation.    Vitals  /83   Pulse 86   Temp 36.8 °C (98.2 °F) (Temporal)   Resp 17   Ht 1.626 m (5' 4\")   Wt 78.9 kg (174 lb)   SpO2 96%   Breastfeeding Yes   BMI 29.87 kg/m²     Gen: NAD, resting comfortably  GI: soft, non tender to palpation, incision c/d/i with a pressure dressing in place  :fundus firm and below umbilicus  Ext: no edema    Recent Labs     03/05/24  0440 03/05/24  1504   WBC 9.5 16.4*   RBC 4.52 3.92*   HEMOGLOBIN 13.7 11.6*   HEMATOCRIT 37.9 32.5*   MCV 83.8 82.9   MCH 30.3 29.6   RDW 37.5 37.9   PLATELETCT 297 261   MPV 10.2 10.2       Assessment:  POD day # 1 s/p RLTCS for CHTN with superimposed preE with severe features    Plan:  Discontinue magnesium this morning at 7 AM  Braraza catheter out this morning  Patient encouraged to take shower remove her pressure dressing  Ambulation encouraged  Continue labetalol 400 mg twice daily and nifedipine 30 mg  Routine post operative care  Discharge home on POD# 2    Luh Pederson M.D.     "

## 2024-03-06 NOTE — CARE PLAN
The patient is Stable - Low risk of patient condition declining or worsening    Shift Goals  Clinical Goals: Maintain vitals  Patient Goals: Healthy mom, healthy baby  Family Goals: Support    Progress made toward(s) clinical / shift goals:      Problem: Infection - Postpartum  Goal: Postpartum patient will be free of signs and symptoms of infection  Outcome: Progressing  Note: Patient remains free from signs and symptoms of infection, vital signs stable.      Problem: Respiratory/Oxygenation Function Post-Surgical  Goal: Patient will achieve/maintain normal respiratory rate/effort  Outcome: Progressing  Note: Patient remains free from signs and symptoms of respiratory distress.

## 2024-03-06 NOTE — LACTATION NOTE
This note was copied from a baby's chart.  This patient Mikala reports that her lactation plan is to exclusively use a breast pump and that she has no desire to latch her baby at this time. She will be returning to work in a few weeks.    Her baby Danni is 35.3 weeks gestation and experienced intrauterine growth restriction. Plan is to support her during her hospital stay and to educate her regarding discharge resources for on-going support.    She knows to pump her breasts every 3 hours around the clock. Supplemental feeding guidelines explained a copy is at bedside. LPI information provided.     Her nurse reported that paced bottle feeding proved difficult for baby so she is planning to introduce a specialty bottle for premature infants to see if that helps.

## 2024-03-07 PROCEDURE — A9270 NON-COVERED ITEM OR SERVICE: HCPCS | Performed by: OBSTETRICS & GYNECOLOGY

## 2024-03-07 PROCEDURE — 700102 HCHG RX REV CODE 250 W/ 637 OVERRIDE(OP): Performed by: OBSTETRICS & GYNECOLOGY

## 2024-03-07 PROCEDURE — 770002 HCHG ROOM/CARE - OB PRIVATE (112)

## 2024-03-07 RX ORDER — NIFEDIPINE 30 MG/1
30 TABLET, EXTENDED RELEASE ORAL EVERY 12 HOURS
Status: DISCONTINUED | OUTPATIENT
Start: 2024-03-07 | End: 2024-03-09 | Stop reason: HOSPADM

## 2024-03-07 RX ADMIN — ACETAMINOPHEN 1000 MG: 500 TABLET, FILM COATED ORAL at 18:08

## 2024-03-07 RX ADMIN — DOCUSATE SODIUM 100 MG: 100 CAPSULE, LIQUID FILLED ORAL at 20:26

## 2024-03-07 RX ADMIN — OXYCODONE 10 MG: 5 TABLET ORAL at 22:40

## 2024-03-07 RX ADMIN — NIFEDIPINE 30 MG: 30 TABLET, FILM COATED, EXTENDED RELEASE ORAL at 08:53

## 2024-03-07 RX ADMIN — OXYCODONE 5 MG: 5 TABLET ORAL at 16:03

## 2024-03-07 RX ADMIN — LABETALOL HYDROCHLORIDE 400 MG: 100 TABLET, FILM COATED ORAL at 18:08

## 2024-03-07 RX ADMIN — IBUPROFEN 800 MG: 800 TABLET, FILM COATED ORAL at 06:13

## 2024-03-07 RX ADMIN — OXYCODONE 10 MG: 5 TABLET ORAL at 03:45

## 2024-03-07 RX ADMIN — ACETAMINOPHEN 1000 MG: 500 TABLET, FILM COATED ORAL at 06:12

## 2024-03-07 RX ADMIN — IBUPROFEN 800 MG: 800 TABLET, FILM COATED ORAL at 16:03

## 2024-03-07 RX ADMIN — ACETAMINOPHEN 1000 MG: 500 TABLET, FILM COATED ORAL at 11:53

## 2024-03-07 RX ADMIN — OXYCODONE 10 MG: 5 TABLET ORAL at 11:53

## 2024-03-07 RX ADMIN — NIFEDIPINE 30 MG: 30 TABLET, FILM COATED, EXTENDED RELEASE ORAL at 20:24

## 2024-03-07 RX ADMIN — PRENATAL WITH FERROUS FUM AND FOLIC ACID 1 TABLET: 3080; 920; 120; 400; 22; 1.84; 3; 20; 10; 1; 12; 200; 27; 25; 2 TABLET ORAL at 08:53

## 2024-03-07 RX ADMIN — LABETALOL HYDROCHLORIDE 400 MG: 100 TABLET, FILM COATED ORAL at 06:12

## 2024-03-07 ASSESSMENT — PAIN DESCRIPTION - PAIN TYPE
TYPE: SURGICAL PAIN

## 2024-03-07 NOTE — LACTATION NOTE
I reinforced feeding and pumping plan with parents of mirna Razo this afternoon. Mikala is obtaining more milk with pumping now. I discussed importance of using a double electric pump rather than a hands free in order to establish a good volume of milk.    Parents deny any concerns and have no further questions.

## 2024-03-07 NOTE — PROGRESS NOTES
OB[facility]YN[facility]Y  Post- Progress Note    CC: POD2 s/p RLTCS for repeat, delivery timing for CHTN with superimposed preeclampsia with severe features.    S: Pt feeling well.  Pain moderately controlled, has been harder now that she has been ambulatory since magnesium stopped yesterday.  Janett reg diet.  Has ambulated.  Lochia mild. Voiding w/o difficulty.      O: Patient Vitals for the past 24 hrs:   BP Temp Temp src Pulse Resp SpO2   24 0745 (!) 156/101 -- -- -- -- --   24 0600 (!) 158/92 36.8 °C (98.3 °F) Temporal 67 18 94 %   24 0200 134/84 36.7 °C (98.1 °F) Temporal 69 18 96 %   24 2200 (!) 156/94 37.6 °C (99.6 °F) Temporal 84 18 96 %   24 1741 139/82 37.5 °C (99.5 °F) Temporal 72 18 97 %   24 1408 132/89 37.2 °C (98.9 °F) Temporal 79 18 98 %   24 0953 134/79 37.1 °C (98.8 °F) Temporal 82 18 96 %   , Temp (24hrs), Av.2 °C (98.9 °F), Min:36.7 °C (98.1 °F), Max:37.6 °C (99.6 °F)       Gen: NAD, AAO    CV: RRR    Pulm: unlabored    Abd: Soft, appropriately tender near incision, no rebound/guarding, fundus firm at U-2.    Incision: clean, dry, intact, with mepilex in place.  No erythema, induration or drainage.    Ext: WWP, 1+ edema, non-tender to palpation    Recent Labs     24  0440 24  1504   WBC 9.5 16.4*   RBC 4.52 3.92*   HEMOGLOBIN 13.7 11.6*   HEMATOCRIT 37.9 32.5*   MCV 83.8 82.9   MCH 30.3 29.6   RDW 37.5 37.9   PLATELETCT 297 261   MPV 10.2 10.2      A/P: Shara AMADOR Am Is is a 33 y.o.  POD2 s/p RLTCS for repeat, delivery timing for CHTN with superimposed preeclampsia with severe features. Recovering well.  *Postpartum state:    - Continue routine postpartum care.    - Encourage ambulation.    - Continue current pain regimen  *CHTN with superimposed mild preeclampsia with severe features: BP trending up this am.  Increased nifedipine, new dose this am.  S/P magnesium seizure PPX.     - Nifedipine XL 30mg PO BID   - Labetalol  400mg PO BID   - Monitor for severe features   - Will likely need 1-2d for monitoring of BP and titration of meds    Dispo: Anticipate d/c POD 3-4      Scooter Sheldon MD, MS, FACOG   3/7/2024, 7:56 AM     [facility]   [phone]

## 2024-03-08 PROCEDURE — 700102 HCHG RX REV CODE 250 W/ 637 OVERRIDE(OP): Performed by: OBSTETRICS & GYNECOLOGY

## 2024-03-08 PROCEDURE — 770002 HCHG ROOM/CARE - OB PRIVATE (112)

## 2024-03-08 PROCEDURE — A9270 NON-COVERED ITEM OR SERVICE: HCPCS | Performed by: OBSTETRICS & GYNECOLOGY

## 2024-03-08 RX ORDER — NIFEDIPINE 10 MG/1
10 CAPSULE ORAL
Status: DISCONTINUED | OUTPATIENT
Start: 2024-03-08 | End: 2024-03-09 | Stop reason: HOSPADM

## 2024-03-08 RX ORDER — LABETALOL HYDROCHLORIDE 5 MG/ML
20-80 INJECTION, SOLUTION INTRAVENOUS
Status: DISCONTINUED | OUTPATIENT
Start: 2024-03-08 | End: 2024-03-09 | Stop reason: HOSPADM

## 2024-03-08 RX ORDER — HYDRALAZINE HYDROCHLORIDE 10 MG/1
5 TABLET, FILM COATED ORAL ONCE
Status: DISCONTINUED | OUTPATIENT
Start: 2024-03-08 | End: 2024-03-08

## 2024-03-08 RX ORDER — HYDRALAZINE HYDROCHLORIDE 20 MG/ML
5-10 INJECTION INTRAMUSCULAR; INTRAVENOUS
Status: DISCONTINUED | OUTPATIENT
Start: 2024-03-08 | End: 2024-03-09 | Stop reason: HOSPADM

## 2024-03-08 RX ORDER — LABETALOL 300 MG/1
300 TABLET, FILM COATED ORAL 3 TIMES DAILY
Status: DISCONTINUED | OUTPATIENT
Start: 2024-03-08 | End: 2024-03-09

## 2024-03-08 RX ADMIN — ACETAMINOPHEN 1000 MG: 500 TABLET, FILM COATED ORAL at 06:07

## 2024-03-08 RX ADMIN — OXYCODONE 5 MG: 5 TABLET ORAL at 22:11

## 2024-03-08 RX ADMIN — NIFEDIPINE 30 MG: 30 TABLET, FILM COATED, EXTENDED RELEASE ORAL at 20:51

## 2024-03-08 RX ADMIN — ACETAMINOPHEN 1000 MG: 500 TABLET, FILM COATED ORAL at 12:02

## 2024-03-08 RX ADMIN — LABETALOL HYDROCHLORIDE 300 MG: 300 TABLET, FILM COATED ORAL at 12:03

## 2024-03-08 RX ADMIN — PRENATAL WITH FERROUS FUM AND FOLIC ACID 1 TABLET: 3080; 920; 120; 400; 22; 1.84; 3; 20; 10; 1; 12; 200; 27; 25; 2 TABLET ORAL at 08:28

## 2024-03-08 RX ADMIN — ACETAMINOPHEN 1000 MG: 500 TABLET, FILM COATED ORAL at 00:11

## 2024-03-08 RX ADMIN — DOCUSATE SODIUM 100 MG: 100 CAPSULE, LIQUID FILLED ORAL at 09:35

## 2024-03-08 RX ADMIN — IBUPROFEN 800 MG: 800 TABLET, FILM COATED ORAL at 14:16

## 2024-03-08 RX ADMIN — LABETALOL HYDROCHLORIDE 300 MG: 300 TABLET, FILM COATED ORAL at 06:08

## 2024-03-08 RX ADMIN — IBUPROFEN 800 MG: 800 TABLET, FILM COATED ORAL at 00:11

## 2024-03-08 RX ADMIN — ACETAMINOPHEN 1000 MG: 500 TABLET, FILM COATED ORAL at 17:55

## 2024-03-08 RX ADMIN — OXYCODONE 5 MG: 5 TABLET ORAL at 09:35

## 2024-03-08 RX ADMIN — LABETALOL HYDROCHLORIDE 300 MG: 300 TABLET, FILM COATED ORAL at 17:55

## 2024-03-08 RX ADMIN — IBUPROFEN 800 MG: 800 TABLET, FILM COATED ORAL at 06:07

## 2024-03-08 RX ADMIN — NIFEDIPINE 30 MG: 30 TABLET, FILM COATED, EXTENDED RELEASE ORAL at 08:28

## 2024-03-08 RX ADMIN — IBUPROFEN 800 MG: 800 TABLET, FILM COATED ORAL at 22:08

## 2024-03-08 ASSESSMENT — PAIN DESCRIPTION - PAIN TYPE
TYPE: SURGICAL PAIN
TYPE: ACUTE PAIN;SURGICAL PAIN
TYPE: SURGICAL PAIN;ACUTE PAIN
TYPE: ACUTE PAIN;SURGICAL PAIN
TYPE: SURGICAL PAIN
TYPE: SURGICAL PAIN

## 2024-03-08 NOTE — CARE PLAN
The patient is Stable - Low risk of patient condition declining or worsening    Shift Goals  Clinical Goals: vss, lochia WNL, bond  Patient Goals: Healthy mom, healthy baby  Family Goals: Support      Problem: Psychosocial - Postpartum  Goal: Patient will verbalize and demonstrate effective bonding and parenting behavior  Outcome: Progressing  Note: MOB has shown adequate bonding with infant, pumping and providing breast milk, and proper cares of infant.      Problem: Knowledge Deficit - Postpartum  Goal: Patient will verbalize and demonstrate understanding of self and infant care  Outcome: Progressing  Note: MOB demonstrates and verbalizes understanding on how to care for . Asks appropriate questions and calls for help when necessary. Education has been provided to patient.

## 2024-03-08 NOTE — LACTATION NOTE
This note was copied from a baby's chart.  Lactation follow-up visit    MOB pumping & providing for 35.3 week baby girl Saline. MOB currently pumping 40-45 ml's of transitional milk, pump settings comfortable. MOB is pumping every 3 hours regularly. Baby was seen by speech & is using a Dr. Brown's bottle, Saline is tolerating feeds well. Encouraged mother to call for any lactation needs.

## 2024-03-08 NOTE — CARE PLAN
The patient is Stable - Low risk of patient condition declining or worsening    Shift Goals  Clinical Goals: Maintain VSS, pain control  Patient Goals: Healthy mom, healthy baby  Family Goals: Support    Progress made toward(s) clinical / shift goals: Pt's heart rate and respiratory rate WDL. Pain managed with scheduled pain medications per MAR. Support person at bedside.    Problem: Early Mobilization - Post Surgery  Goal: Early mobilization post surgery  Outcome: Progressing     Patient is not progressing towards the following goals: Pt experiencing elevated blood pressures. RN notifies MD regarding blood pressures.    Problem: Altered Physiologic Condition  Goal: Patient physiologically stable as evidenced by normal lochia, palpable uterine involution and vitals within normal limits  Outcome: Not Met

## 2024-03-08 NOTE — PROGRESS NOTES
Hospital Day : 3    S: No sx pih.  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  Feels like bps are elevated with pain; min lochia    O:  Vitals:    03/07/24 1748 03/07/24 2030 03/07/24 2200 03/08/24 0200   BP: (!) 160/94 (!) 144/96 129/89 (!) 145/93   Pulse: 81  84 75   Resp: 20  18 17   Temp: 36.9 °C (98.5 °F)  36.6 °C (97.9 °F) 36.7 °C (98 °F)   TempSrc: Temporal  Temporal Temporal   SpO2: 96%  96% 97%   Weight:       Height:           Recent Labs     03/05/24  1504   WBC 16.4*   RBC 3.92*   HEMOGLOBIN 11.6*   HEMATOCRIT 32.5*   MCV 82.9   MCH 29.6   MCHC 35.7*   RDW 37.9   PLATELETCT 261   MPV 10.2             Abd soft ff; cdi    A: pod 3 sp rltcs at 35.3 (chronic htn + pih with severe features); sp mg now on labatolol 400 bid and procardia xl 30 bid with continued labile bps    P: will watch bp today; and change labatolol to 300 tid; prob will need to titer up

## 2024-03-08 NOTE — PROGRESS NOTES
Assessment completed. Fundus firm, lochia light. Adominal incision with dressing intact mepilex silver. Plan of care reviewed with MOB, verbalized understanding. Denies pain at this time, will call if pain medication needed.      1700- Notified Dr. Working of two elevated pressures during day shift. MD read message no new orders or reply. Handoff to VELASQUEZ Bailey

## 2024-03-09 VITALS
TEMPERATURE: 99.4 F | OXYGEN SATURATION: 97 % | BODY MASS INDEX: 29.71 KG/M2 | HEART RATE: 84 BPM | HEIGHT: 64 IN | SYSTOLIC BLOOD PRESSURE: 147 MMHG | DIASTOLIC BLOOD PRESSURE: 99 MMHG | WEIGHT: 174 LBS | RESPIRATION RATE: 17 BRPM

## 2024-03-09 PROCEDURE — A9270 NON-COVERED ITEM OR SERVICE: HCPCS | Performed by: OBSTETRICS & GYNECOLOGY

## 2024-03-09 PROCEDURE — 700102 HCHG RX REV CODE 250 W/ 637 OVERRIDE(OP): Performed by: OBSTETRICS & GYNECOLOGY

## 2024-03-09 PROCEDURE — 97602 WOUND(S) CARE NON-SELECTIVE: CPT

## 2024-03-09 RX ORDER — NIFEDIPINE 10 MG/1
10 CAPSULE ORAL ONCE
Status: DISCONTINUED | OUTPATIENT
Start: 2024-03-09 | End: 2024-03-09

## 2024-03-09 RX ORDER — LABETALOL 300 MG/1
600 TABLET, FILM COATED ORAL EVERY 8 HOURS
Qty: 90 TABLET | Refills: 1 | Status: SHIPPED | OUTPATIENT
Start: 2024-03-09 | End: 2024-03-09

## 2024-03-09 RX ORDER — OXYCODONE HYDROCHLORIDE 5 MG/1
5 TABLET ORAL EVERY 4 HOURS PRN
Qty: 18 TABLET | Refills: 0 | Status: SHIPPED | OUTPATIENT
Start: 2024-03-09 | End: 2024-03-09

## 2024-03-09 RX ORDER — LABETALOL 300 MG/1
600 TABLET, FILM COATED ORAL EVERY 8 HOURS
Qty: 90 TABLET | Refills: 1 | Status: SHIPPED | OUTPATIENT
Start: 2024-03-09

## 2024-03-09 RX ORDER — NIFEDIPINE 30 MG/1
30 TABLET, EXTENDED RELEASE ORAL EVERY 12 HOURS
Qty: 30 TABLET | Refills: 1 | Status: SHIPPED | OUTPATIENT
Start: 2024-03-09 | End: 2024-03-09

## 2024-03-09 RX ORDER — NIFEDIPINE 30 MG/1
30 TABLET, EXTENDED RELEASE ORAL EVERY 12 HOURS
Qty: 30 TABLET | Refills: 1 | Status: SHIPPED | OUTPATIENT
Start: 2024-03-09

## 2024-03-09 RX ORDER — IBUPROFEN 800 MG/1
800 TABLET ORAL EVERY 8 HOURS PRN
Qty: 30 TABLET | Refills: 1 | Status: SHIPPED | OUTPATIENT
Start: 2024-03-09 | End: 2024-03-09

## 2024-03-09 RX ORDER — LABETALOL 300 MG/1
300 TABLET, FILM COATED ORAL EVERY 8 HOURS
Status: DISCONTINUED | OUTPATIENT
Start: 2024-03-09 | End: 2024-03-09 | Stop reason: HOSPADM

## 2024-03-09 RX ORDER — IBUPROFEN 800 MG/1
800 TABLET ORAL EVERY 8 HOURS PRN
Qty: 30 TABLET | Refills: 1 | Status: SHIPPED | OUTPATIENT
Start: 2024-03-09

## 2024-03-09 RX ORDER — OXYCODONE HYDROCHLORIDE 5 MG/1
5 TABLET ORAL EVERY 4 HOURS PRN
Qty: 18 TABLET | Refills: 0 | Status: SHIPPED | OUTPATIENT
Start: 2024-03-09 | End: 2024-03-12

## 2024-03-09 RX ORDER — NIFEDIPINE 10 MG/1
10 CAPSULE ORAL ONCE
Status: COMPLETED | OUTPATIENT
Start: 2024-03-09 | End: 2024-03-09

## 2024-03-09 RX ADMIN — ACETAMINOPHEN 1000 MG: 500 TABLET, FILM COATED ORAL at 00:05

## 2024-03-09 RX ADMIN — IBUPROFEN 800 MG: 800 TABLET, FILM COATED ORAL at 05:33

## 2024-03-09 RX ADMIN — NIFEDIPINE 30 MG: 30 TABLET, FILM COATED, EXTENDED RELEASE ORAL at 08:27

## 2024-03-09 RX ADMIN — ACETAMINOPHEN 1000 MG: 500 TABLET, FILM COATED ORAL at 05:33

## 2024-03-09 RX ADMIN — PRENATAL WITH FERROUS FUM AND FOLIC ACID 1 TABLET: 3080; 920; 120; 400; 22; 1.84; 3; 20; 10; 1; 12; 200; 27; 25; 2 TABLET ORAL at 08:27

## 2024-03-09 RX ADMIN — LABETALOL HYDROCHLORIDE 300 MG: 300 TABLET, FILM COATED ORAL at 11:37

## 2024-03-09 RX ADMIN — LABETALOL HYDROCHLORIDE 300 MG: 300 TABLET, FILM COATED ORAL at 05:32

## 2024-03-09 RX ADMIN — NIFEDIPINE 10 MG: 10 CAPSULE ORAL at 00:05

## 2024-03-09 ASSESSMENT — PAIN DESCRIPTION - PAIN TYPE
TYPE: ACUTE PAIN;SURGICAL PAIN
TYPE: ACUTE PAIN
TYPE: ACUTE PAIN;SURGICAL PAIN
TYPE: ACUTE PAIN;SURGICAL PAIN
TYPE: ACUTE PAIN
TYPE: ACUTE PAIN

## 2024-03-09 NOTE — LACTATION NOTE
This note was copied from a baby's chart.  Follow up lactation consultation:    Mom continues to pump and provide for her baby. She is currently pumping using 25mm flanges, which appear to fit appropriately, mom reports no discomfort. Appropriate settings being used. Pumping is yielding 40-50mL from each breast.     Encouraged her to continue to pump every three hours. She has a breast pump at home.     Plan: continue pumping q3h. Provided with [facility] outpatient resource handout. Will place outpatient  referral to [facility].

## 2024-03-09 NOTE — PROGRESS NOTES
Assessment completed. Fundus firm, lochia light. VSS. Tolerating diet. Voiding without difficulty, incision dressing CDI. Pt states passing gas. Pain controlled with PRN medications per MAR. Will offer pain medications as they become available. POC discussed with patient. Encouraged to call with needs, Call light within reach.

## 2024-03-09 NOTE — CARE PLAN
The patient is Watcher - Medium risk of patient condition declining or worsening    Shift Goals  Clinical Goals: VSS, lochia WNL  Patient Goals: Healthy mom, healthy baby  Family Goals: Support    Progress made toward(s) clinical / shift goals:    Problem: Knowledge Deficit - Postpartum  Goal: Patient will verbalize and demonstrate understanding of self and infant care  Outcome: Progressing  Note: Reinforced expected lochia color and amount, s/s of infection, s/s of blood clots, s/s of pre-eclampsia that can occur during the postpartum period, and self care.   Reinforced skin to skin, safe sleep, feeding frequency/duration, bundling in open crib, appropriate  dress, and use of hat for infant.      Problem: Altered Physiologic Condition  Goal: Patient physiologically stable as evidenced by normal lochia, palpable uterine involution and vitals within normal limits  Outcome: Progressing  Note: Lochia WNL, uterus is palpable and involuting appropriately. Pt with elevated blood pressures, MD aware, new orders received throughout shift.        Patient is not progressing towards the following goals:

## 2024-03-09 NOTE — PROGRESS NOTES
"POD# 4    S: Doing well. Denies HA/RUQ pain/scotoma. Notes LE Edema. She is frustrated by her BP. Worst pain is at her incision. Lochia normal.     O:   BP (!) 155/92   Pulse 85   Temp 36.8 °C (98.2 °F) (Temporal)   Resp 18   Ht 1.626 m (5' 4\")   Wt 78.9 kg (174 lb)   SpO2 97%   BP Range: 117-170/  Gen: NAD  Abdomen: Fundus firm below umbilicus. No TTP  Incision: Mepilex in place. Skin removed bilaterally at sites of Prior pressure dressing  Ext: +1 LE edema     RH: positive  GBS: unknown  Rubella: immune    Labs:   None new    A/P 33you  s/p ERCS for CHTN with SI preE, IUGR, GDMA2  EBL: 855  Complications: none apparent   -  Routine post op care  CHTN with SI preE  -Asymptomatic  -BP remains very labile with no pattern relative to medication dosing  -Overnight had severe range BP that did resolve prior to needing IV treatment  -Continue current meds: Procardia XL 30mg BID and Labetalol 300mg TID  -Continue observation. May need medicine or MFM consult for further management  3. Incision:   -Wound care consult for skin avulsion related to prior pressure dressing  -Vaseline prn     Continue IP management due to labile BP     Pat Merida MD   "

## 2024-03-09 NOTE — WOUND TEAM
[facility]  Inpatient Services  Wound and Skin Care Brief Evaluation    Admission Date: 3/5/2024     Last order of IP CONSULT TO WOUND CARE was found on 3/8/2024 from Hospital Encounter on 2/19/2024     HPI, PMH, SH: Reviewed    No chief complaint on file.    Diagnosis: Pre-eclampsia affecting childbirth [O14.94]    Unit where seen by Wound Team: S340/00     Wound consult placed regarding L & R lower abdomen/hip. Chart and images reviewed. This discussed with bedside RN Ruthie. This clinician in to assess patient. Patient pleasant and agreeable. The R lower abdomen/hip with scattered skin tears noted. Mepitel used to cover tears. The L lower hip also had skin tears, but patient expressed pain from this area, a hydrocolloid placed over tears to promote tissue healing. Patient politely declined skin check of sacrum and B heels. Non-selectively debrided with Normal Saline and Gauze.     No pressure injuries or advanced wound care needs identified. Wound consult completed. No further follow up unless indicated and consulted.     L hip    R hip         PREVENTATIVE INTERVENTIONS:    Q shift Frank - performed per nursing policy  Q shift pressure point assessments - performed per nursing policy    Surface/Positioning  Standard/trauma mattress - Currently in Place

## 2024-03-09 NOTE — PROGRESS NOTES
1850: Report received from VELASQUEZ Rodríguez. Assumed care of pt.      1924: MD Working notified on day shift of pt's blood pressures. New parameters given to notify if blood pressure is 160/110, nursing communication put in.     2030: MD Working notified and picture sent via Voalte, of abrasions/blisters to pt's lateral abdomen bilateral upper hips that were underneath the pressure dressing. Wound consult placed.     2050: Assessment complete. Reviewed call light system and emergency call. Discussed pharmacologic and non pharmacologic measures to manage pain, including scheduled and as needed medications. Pt reports throbbing pain to abrasions/blisters ice applied and declines need for medication at this time. PRN vaseline in use as well. Pt is pumping to provide, reviewed pump settings and milk storage guidelines. All questions answered at this time.     2254: MD Working notified of pt's blood pressure of 170/101. Discussed use of OB hypertensive protocol on the post partum floor, verified with charge RN that we do not utilize this protocol on this floor. MD states she will put orders in.   IV hydralazine not available on post partum floor in [facility], this RN and charge RN called pharmacy to have medication sent to station 34.     2355: MD Working notified of pt's blood pressure of 155/99, orders to give one time dose of immediate release nifedipine orally,  and hold IV hydralazine received.

## 2024-03-10 NOTE — DISCHARGE INSTRUCTIONS
PATIENT DISCHARGE EDUCATION INSTRUCTION SHEET    REASONS TO CALL YOUR OBSTETRICIAN  Persistent fever, shaking, chills (Temperature higher than 100.4) may indicate you have an infection  Heavy bleeding: soaking more than 1 pad per hour; Passing clots an egg-sized clot or bigger may mean you have an postpartum hemorrhage  Foul odor from vagina or bad smelling or discolored discharge or blood  Breast infection (Mastitis symptoms); breast pain, chills, fever, redness or red streaks, may feel flu like symptoms  Urinary pain, burning or frequency  Incision that is not healing, increased redness, swelling, tenderness or pain, or any pus from episiotomy or  site may mean you have an infection  Redness, swelling, warmth, or painful to touch in the calf area of your leg may mean you have a blood clot  Severe or intensified depression, thoughts or feelings of wanting to hurt yourself or someone else   Pain in chest, obstructed breathing or shortness of breath (trouble catching your breath) may mean you are having a postpartum complication. Call your provider immediately   Headache that does not get better, even after taking medicine, a bad headache with vision changes or pain in the upper right area of your belly may mean you have high blood pressure or post birth preeclampsia. Call your provider immediately    HAND WASHING  All family and friends should wash their hands:  Before and after holding the baby  Before feeding the baby  After using the restroom or changing the baby's diaper    WOUND CARE  Ask your physician for additional care instructions. In general:  Episiotomy/Laceration  May use boone-spray bottle, witch hazel pads and dermaplast spray for comfort  Use boone-spray bottle after urinating to cleanse perineal area  To prevent burning during urination spray boone-water bottle on labial area   Pat perineal area dry until episiotomy/laceration is healed  Continue to use boone-bottle until bleeding stops as  needed  If have a 2nd degree laceration or greater, a Sitz bath can offer relief from soreness, burning, and inflammation   Sitz Bath   Sit in 6 inches of warm water and soak laceration as needed until the laceration heals    VAGINAL CARE AND BLEEDING  Nothing inside vagina for 6 weeks:   No sexual intercourse, tampons or douching  Bleeding may continue for 2-4 weeks. Amount and color may vary  Soaking 1 pad or more in an hour for several hours is considered heavy bleeding  Passing large egg sized blood clots can be concerning  If you feel like you have heavy bleeding or are having increasing amount of blood clots call your Obstetrician immediately  If you begin feeling faint upon standing, feeling sick to your stomach, have clammy skin, a really fast heartbeat, have chills, start feeling confused, dizzy, sleepy or weak, or feeling like you're going to faint call your Obstetrician immediately    HYPERTENSION   Preeclampsia or gestational hypertension are types of high blood pressure that only pregnant women can get. It is important for you to be aware of symptoms to seek early intervention and treatment. If you have any of these symptoms immediately call your Obstetrician    Vision changes or blurred vision   Severe headache or pain that is unrelieved with medication and will not go away  Persistent pain in upper abdomen or shoulder   Increased swelling of face, feet, or hands  Difficulty breathing or shortness of breath at rest  Urinating less than usual    URINATION AND BOWEL MOVEMENTS  Eating more fiber (bran cereal, fruits, and vegetables) and drinking plenty of fluids will help to avoid constipation  Urinary frequency and urgency after childbirth is normal  If you experience any urinary pain, burning or frequency call your provider    BIRTH CONTROL  It is possible to become pregnant at any time after delivery and while breastfeeding  Plan to discuss a method of birth control with your physician at your post  "delivery follow up visit    POSTPARTUM BLUES  During the first few days after birth, you may experience a sense of the \"blues\" which may include impatience, irritability or even crying. These feelings come and go quickly. However, as many as 1 in 10 women experience emotional symptoms known as postpartum depression.     POSTPARTUM DEPRESSION    May start as early as the second or third day after delivery or take several weeks or months to develop. Symptoms of \"blues\" are present, but are more intense: Crying spells; loss of appetite; feelings of hopelessness or loss of control; fear of touching the baby; over concern or no concern at all about the baby; little or no concern about your own appearance/caring for yourself; and/or inability to sleep or excessive sleeping. Contact your Obstetrician if you are experiencing any of these symptoms     PREVENTING SHAKEN BABY  If you are angry or stressed, PUT THE BABY IN THE CRIB, step away, take some deep breaths, and wait until you are calm to care for the baby. DO NOT SHAKE THE BABY. You are not alone, call a supporter for help.  [facility] 24/7 crisis call line ([phone]) or (1-216.254.3983)  You can also text them, text \"ANSWER\" (401161)  "

## 2024-03-10 NOTE — PROGRESS NOTES
Discharge instructions reviewed with patient. Follow up appointments and information reviewed. All questions and concerns answered and addressed.

## 2024-03-10 NOTE — DISCHARGE SUMMARY
DATE OF ADMISSION:  2024   DATE OF DISCHARGE:  2024     PATIENT OF:  Sarina Berman MD     ADMITTING DIAGNOSES:  1.  Intrauterine pregnancy at 35 and 3/7th weeks' gestational age with chronic   hypertension.  2.  Superimposed preeclampsia.  3.  Class A2 gestational diabetes.    4.  Fetal growth restriction.    5.  History of  section.     DISCHARGE DIAGNOSES:    1.  Intrauterine pregnancy at 35 and 3/7th weeks' gestational age with chronic   hypertension.  2.  Superimposed preeclampsia.  3.  Class A2 gestational diabetes.    4.  Fetal growth restriction.    5.  History of  section.  6.  Status post repeat  section secondary to above.      HOSPITAL COURSE:  The patient was admitted and underwent her  section   uncomplicated on .  She is postoperative day #4.  She received magnesium   sulfate for 24 hours postoperatively and her blood pressures have been labile   since then. She was started on labetalol, which is now at 300 mg 3 times a   day.  She is also on Procardia-XL 30 mg twice a day.  Blood pressures continue   to be label with her last blood pressure being a 147/99, prior to that 133/91   but highest has been a 155/92.  I spoke with Dr. [facility] who states that   as long as she has not had any severe range blood pressures, her   recommendation is to double her labetalol to 300 mg 3 times a day and send her   home with blood pressure checks at home with instructions to come back with   any severe range blood pressures.  I have written for the patient's discharge   with clear times to take her labetalol 3 times a day as well as her Procardia   twice a day.  She states that she does have a blood pressure cuff at home and   will check her blood pressures.  She will follow up with Dr. Berman in 1 week   in the office.  Of note.  her laboratory evaluation has always been normal.    As well the patient had a pressure dressing over her Mepilex that was removed   and it  caused some skin avulsion for which wound care saw the patient today   and placed wound dressings that are to remain on for the next 2-3 days with no   further management needed after that.  The patient is now going to be   discharged home in stable condition.  I have written for her labetalol 600 mg   to be taken 3 times a day, Procardia-XL 30 mg to be taken twice a day, 800 mg   of Motrin and oxycodone 5 mg tablets.  Once again, the patient does have a   clear understanding of all of her instructions and will follow up with us next   week.        ______________________________  Corinne E. Capurro, MD C[facility]/YINKA    DD:  03/09/2024 16:33  DT:  03/09/2024 18:13    Job#:  [id number]

## 2024-03-18 ENCOUNTER — HOSPITAL ENCOUNTER (OUTPATIENT)
Facility: MEDICAL CENTER | Age: 34
End: 2024-03-19
Attending: OBSTETRICS & GYNECOLOGY | Admitting: OBSTETRICS & GYNECOLOGY
Payer: COMMERCIAL

## 2024-03-18 ENCOUNTER — APPOINTMENT (OUTPATIENT)
Dept: RADIOLOGY | Facility: MEDICAL CENTER | Age: 34
End: 2024-03-18
Attending: OBSTETRICS & GYNECOLOGY
Payer: COMMERCIAL

## 2024-03-18 PROCEDURE — 84550 ASSAY OF BLOOD/URIC ACID: CPT

## 2024-03-18 PROCEDURE — 84156 ASSAY OF PROTEIN URINE: CPT

## 2024-03-18 PROCEDURE — 80053 COMPREHEN METABOLIC PANEL: CPT

## 2024-03-18 PROCEDURE — 85025 COMPLETE CBC W/AUTO DIFF WBC: CPT

## 2024-03-18 PROCEDURE — 82570 ASSAY OF URINE CREATININE: CPT

## 2024-03-18 PROCEDURE — 36415 COLL VENOUS BLD VENIPUNCTURE: CPT

## 2024-03-18 ASSESSMENT — PAIN SCALES - GENERAL: PAINLEVEL: 0 - NO PAIN

## 2024-03-18 ASSESSMENT — FIBROSIS 4 INDEX: FIB4 SCORE: 1.03

## 2024-03-19 VITALS
BODY MASS INDEX: 26.46 KG/M2 | RESPIRATION RATE: 18 BRPM | TEMPERATURE: 97.5 F | SYSTOLIC BLOOD PRESSURE: 155 MMHG | HEIGHT: 64 IN | OXYGEN SATURATION: 97 % | HEART RATE: 67 BPM | WEIGHT: 155 LBS | DIASTOLIC BLOOD PRESSURE: 98 MMHG

## 2024-03-19 LAB
ALBUMIN SERPL BCP-MCNC: 3.8 G/DL (ref 3.2–4.9)
ALBUMIN/GLOB SERPL: 1.2 G/DL
ALP SERPL-CCNC: 83 U/L (ref 30–99)
ALT SERPL-CCNC: 35 U/L (ref 2–50)
ANION GAP SERPL CALC-SCNC: 12 MMOL/L (ref 7–16)
AST SERPL-CCNC: 24 U/L (ref 12–45)
BASOPHILS # BLD AUTO: 0.3 % (ref 0–1.8)
BASOPHILS # BLD: 0.02 K/UL (ref 0–0.12)
BILIRUB SERPL-MCNC: 0.2 MG/DL (ref 0.1–1.5)
BUN SERPL-MCNC: 14 MG/DL (ref 8–22)
CALCIUM ALBUM COR SERPL-MCNC: 8.9 MG/DL (ref 8.5–10.5)
CALCIUM SERPL-MCNC: 8.7 MG/DL (ref 8.5–10.5)
CHLORIDE SERPL-SCNC: 108 MMOL/L (ref 96–112)
CO2 SERPL-SCNC: 21 MMOL/L (ref 20–33)
CREAT SERPL-MCNC: 1.18 MG/DL (ref 0.5–1.4)
CREAT UR-MCNC: 141.82 MG/DL
EOSINOPHIL # BLD AUTO: 0.28 K/UL (ref 0–0.51)
EOSINOPHIL NFR BLD: 4.7 % (ref 0–6.9)
ERYTHROCYTE [DISTWIDTH] IN BLOOD BY AUTOMATED COUNT: 40.4 FL (ref 35.9–50)
GFR SERPLBLD CREATININE-BSD FMLA CKD-EPI: 62 ML/MIN/1.73 M 2
GLOBULIN SER CALC-MCNC: 3.2 G/DL (ref 1.9–3.5)
GLUCOSE SERPL-MCNC: 93 MG/DL (ref 65–99)
HCT VFR BLD AUTO: 32.1 % (ref 37–47)
HGB BLD-MCNC: 10.8 G/DL (ref 12–16)
IMM GRANULOCYTES # BLD AUTO: 0.01 K/UL (ref 0–0.11)
IMM GRANULOCYTES NFR BLD AUTO: 0.2 % (ref 0–0.9)
LYMPHOCYTES # BLD AUTO: 1.86 K/UL (ref 1–4.8)
LYMPHOCYTES NFR BLD: 31.1 % (ref 22–41)
MCH RBC QN AUTO: 28.9 PG (ref 27–33)
MCHC RBC AUTO-ENTMCNC: 33.6 G/DL (ref 32.2–35.5)
MCV RBC AUTO: 85.8 FL (ref 81.4–97.8)
MONOCYTES # BLD AUTO: 0.33 K/UL (ref 0–0.85)
MONOCYTES NFR BLD AUTO: 5.5 % (ref 0–13.4)
NEUTROPHILS # BLD AUTO: 3.48 K/UL (ref 1.82–7.42)
NEUTROPHILS NFR BLD: 58.2 % (ref 44–72)
NRBC # BLD AUTO: 0 K/UL
NRBC BLD-RTO: 0 /100 WBC (ref 0–0.2)
PLATELET # BLD AUTO: 370 K/UL (ref 164–446)
PMV BLD AUTO: 8.7 FL (ref 9–12.9)
POTASSIUM SERPL-SCNC: 3.5 MMOL/L (ref 3.6–5.5)
PROT SERPL-MCNC: 7 G/DL (ref 6–8.2)
PROT UR-MCNC: 11 MG/DL (ref 0–15)
PROT/CREAT UR: 78 MG/G (ref 10–107)
RBC # BLD AUTO: 3.74 M/UL (ref 4.2–5.4)
SODIUM SERPL-SCNC: 141 MMOL/L (ref 135–145)
URATE SERPL-MCNC: 6 MG/DL (ref 1.9–8.2)
WBC # BLD AUTO: 6 K/UL (ref 4.8–10.8)

## 2024-03-19 PROCEDURE — 76830 TRANSVAGINAL US NON-OB: CPT

## 2024-03-19 NOTE — PROGRESS NOTES
" Delivered via  on 3/5/24 at 35/3 for pre-eclampsia    0 - Pt arrived to labor and delivery for c/o vaginal bleeding and passing clots. Pt states she has had bleeding on and off, at one point she had to change her pad in under an hour and described it as \"like a period\". Pt also states she has been passing clots all day, most of them small. However earlier this evening she passed a clot \"like the size of my fist. It weighed down my underwear when it came out\". Pt placed in LDA 4. Visual inspection of the pt's perineum revealed a small amount of dark red blood. This RN performed a fundal rub, no increased bleeding or passing of clots was noted. The fundus was too deep in the pt's pelvis to be palpated. FOB at bedside. POC discussed with pt and family members, all questions answered.       - Report given to Dr. Amaya, orders received, see orders for details.   "

## 2024-03-19 NOTE — H&P
DATE OF ADMISSION:  2024     OB-GYN OBSERVATION HISTORY AND PHYSICAL     IDENTIFICATION:  This is a 33-year-old  3, para 2-0-1-2, status post   repeat  section on 2024 who presents with a chief complaint of   vaginal bleeding.     HISTORY OF PRESENT ILLNESS:  This is a patient of Dr. Berman who recently had a   repeat  on 2024 at 35 and 3/7th weeks.  She was on mag   afterwards for diuresis and then eventually discharged home on labetalol 600   q.8h. and Procardia-SR 30 b.i.d.  She saw her provider last week and the   Procardia was discontinued and the labetalol was moved to b.i.d.  She states   that her lochia has been light until recently when she passed a golf ball size   clot.  She denies active vaginal bleeding.  Here in labor and delivery, her   blood pressures ranged from 133-165/. She was diagnosed as chronic   hypertensive during the course of her pregnancy as well as gestational   diabetes.  She denies headaches, visual changes, epigastric pain, nondependent   edema.  She is currently pumping.  She states that she has been monitoring   her blood pressures at home and has just been on labetalol 600 b.i.d. for the   last 72 hours, but she did take her Procardia-SR 30 dose this morning at 9   a.m.  She has no other complaints.  Denies nausea, vomiting, fever, chills,   change in bowel or bladder habits.  Denies abdominal pain or foul lochia.  She   is pumping.     OBSTETRICAL HISTORY:  Significant for prior , prior elective   termination of pregnancy and the current pregnancy delivered via  on   2024.     GYNECOLOGIC HISTORY:  Denies STDs, abnormal Paps.     PAST MEDICAL HISTORY:  ALLERGIES:  None.     CURRENT MEDICATIONS:  Prenatal vitamins, labetalol 600 b.i.d.     MEDICAL PROBLEMS:  Chronic hypertension.     PAST SURGICAL HISTORY:   x2.     SOCIAL HISTORY:  Denies alcohol, tobacco or drug abuse.     FAMILY HISTORY:   Noncontributory.     REVIEW OF SYSTEMS:  Times 12 is negative per AMA standards available in chart.     LABORATORY DATA:  Her white count is 6, hemoglobin is 10.8, hematocrit 32.1   and platelet count is 370.  Her AST is 24, ALT is 25 and P/C ratio is 78.      DIAGNOSTIC DATA:  Ultrasound shows a thickened heterogeneous endometrial   stripe with uterus measuring 12.5x6.5x7.6.  No free fluid in the pelvis with a   2.8 cm echogenic complex appearing to be clot.     PHYSICAL EXAMINATION:    VITAL SIGNS:  The patient is afebrile, blood pressure is to   130s-160s/90s-100s.  GENERAL:  She is awake, alert, in no apparent distress.  NECK:  Supple.  HEART:  Regular.  CHEST:  Clear.  BREASTS:  Symmetrical.  ABDOMEN:  Soft and nontender.  She has a fundus well below the umbilicus.  Her   uterus is firm.  GYNECOLOGIC: She has some scant blood on her pad.     ASSESSMENT:   1.  Postop day #13 status post repeat low transverse .  2.  Chronic hypertension.  3.  Ultrasound demonstrates clot in her uterus in the postpartum period but no   obvious retained products when I viewed the images.     PLAN:  At this time, I have discussed the case with Dr. Lozoya, perinatology.    He recommends restarting the labetalol 600 q.8h. and continuing Procardia-SR   30 daily.  He does not feel the patient needs mag at this time.  She does have   followup in 48 hours.  I have discussed with the patient that it would be   exceedingly rare for her to have retained products of conception given her    and it appears that she just has clot that has consolidated in her   uterus and that she will continue to pass into her uterus is empty.  Given her   physical exam, vital signs and stable H  and H, we will discharge her home   with precautions.  She will follow up on Thursday with Dr. Berman. She has   ample supply of medications at home to restart her labetalol 600 q.8h. and   Procardia-SR 30 daily.  She also has the ability to do home blood  pressure   monitoring.  PIH precautions have been given.  All questions have been   answered.  The patient is comfortable with the plan.        ______________________________  MD KARAL Wilson/YINKA    DD:  03/19/2024 02:27  DT:  03/19/2024 03:38    Job#:  [id number]

## 2024-03-19 NOTE — PROGRESS NOTES
0115: Report from Piedad JARA RN. POC discussed.    0138: Dr. Amaya notified of /98 and US and lab results. Provider will come to bedside to see pt.    0150: Dr. Amaya at bedside.    0220: Pt given breast pump and supplies for pumping.    0230: Discharge orders received from Dr. Amaya.    0244: Patient discharged home with specific instruction to return to L&D/Physician ie.. Bleeding/concerns for self.  Patient denies questions or concerns regarding POC since arrival and POC to discharge home.  Patient transported by wheelchair by B out of hospital.

## 2024-04-21 ENCOUNTER — TELEPHONE (OUTPATIENT)
Dept: MEDICAL GROUP | Facility: PHYSICIAN GROUP | Age: 34
End: 2024-04-21
Payer: COMMERCIAL

## 2024-04-21 NOTE — LETTER
4/21/2024            Shara AMADOR Am Is  [address] [place] [address]              Dear Shara,    Your care is very important to us, and we have noticed that on 04/16/2024, you missed your appointment with Amarilys BLOOD at [facility]       We’re committed to providing you with the best care possible. Your appointment time is reserved for you and your provider to discuss any current or new health concerns and, together, determine the best plan of care for you. Please call [phone] to reschedule at your earliest convenience.        In some cases, [facility] offers additional resources to make your healthcare more accessible, including transportation assistance, financial assistance and virtual visits. To learn more about these resources, please call [phone].       In order to keep you as informed as possible, below is a brief summary of our policy regarding missed appointments:        If a patient “No Shows”??three (3) or more appointments within a rolling 12-month           period, they may be dismissed from the practice for failure to follow clinician      recommendations.     If you have any concerns regarding the care you are receiving, please talk with your provider or call the office at [phone] and request to speak with the Practice . We’re committed to providing excellent care, and your feedback is invaluable.          Sincerely,    Amarilys BLOOD

## 2024-10-08 ENCOUNTER — OFFICE VISIT (OUTPATIENT)
Dept: MEDICAL GROUP | Facility: MEDICAL CENTER | Age: 34
End: 2024-10-08
Payer: COMMERCIAL

## 2024-10-08 VITALS
HEART RATE: 100 BPM | DIASTOLIC BLOOD PRESSURE: 90 MMHG | BODY MASS INDEX: 25.1 KG/M2 | OXYGEN SATURATION: 99 % | SYSTOLIC BLOOD PRESSURE: 140 MMHG | WEIGHT: 147 LBS | TEMPERATURE: 99.2 F | HEIGHT: 64 IN

## 2024-10-08 DIAGNOSIS — N61.0 MASTITIS: ICD-10-CM

## 2024-10-08 DIAGNOSIS — I10 ESSENTIAL HYPERTENSION: ICD-10-CM

## 2024-10-08 PROCEDURE — 3077F SYST BP >= 140 MM HG: CPT | Performed by: STUDENT IN AN ORGANIZED HEALTH CARE EDUCATION/TRAINING PROGRAM

## 2024-10-08 PROCEDURE — 3080F DIAST BP >= 90 MM HG: CPT | Performed by: STUDENT IN AN ORGANIZED HEALTH CARE EDUCATION/TRAINING PROGRAM

## 2024-10-08 PROCEDURE — 99214 OFFICE O/P EST MOD 30 MIN: CPT | Performed by: STUDENT IN AN ORGANIZED HEALTH CARE EDUCATION/TRAINING PROGRAM

## 2024-10-08 RX ORDER — CEPHALEXIN 500 MG/1
500 CAPSULE ORAL 4 TIMES DAILY
Qty: 40 CAPSULE | Refills: 0 | Status: SHIPPED | OUTPATIENT
Start: 2024-10-08 | End: 2024-10-18

## 2024-10-08 RX ORDER — LABETALOL 300 MG/1
600 TABLET, FILM COATED ORAL 2 TIMES DAILY
Qty: 30 TABLET | Refills: 0 | Status: SHIPPED | OUTPATIENT
Start: 2024-10-08 | End: 2024-10-08

## 2024-10-08 RX ORDER — LABETALOL 300 MG/1
600 TABLET, FILM COATED ORAL 2 TIMES DAILY
Qty: 30 TABLET | Refills: 0 | Status: SHIPPED | OUTPATIENT
Start: 2024-10-08 | End: 2024-10-15

## 2024-10-08 ASSESSMENT — FIBROSIS 4 INDEX: FIB4 SCORE: 0.38

## 2024-10-15 ENCOUNTER — OFFICE VISIT (OUTPATIENT)
Dept: MEDICAL GROUP | Facility: MEDICAL CENTER | Age: 34
End: 2024-10-15
Payer: COMMERCIAL

## 2024-10-15 VITALS
HEART RATE: 60 BPM | TEMPERATURE: 97.7 F | HEIGHT: 64 IN | BODY MASS INDEX: 24.73 KG/M2 | DIASTOLIC BLOOD PRESSURE: 68 MMHG | SYSTOLIC BLOOD PRESSURE: 106 MMHG | OXYGEN SATURATION: 100 % | WEIGHT: 144.84 LBS

## 2024-10-15 DIAGNOSIS — I10 ESSENTIAL HYPERTENSION: ICD-10-CM

## 2024-10-15 DIAGNOSIS — R79.89 LOW VITAMIN D LEVEL: ICD-10-CM

## 2024-10-15 DIAGNOSIS — D64.9 ANEMIA, UNSPECIFIED TYPE: ICD-10-CM

## 2024-10-15 PROCEDURE — 3074F SYST BP LT 130 MM HG: CPT | Performed by: FAMILY MEDICINE

## 2024-10-15 PROCEDURE — 3078F DIAST BP <80 MM HG: CPT | Performed by: FAMILY MEDICINE

## 2024-10-15 PROCEDURE — 99214 OFFICE O/P EST MOD 30 MIN: CPT | Performed by: FAMILY MEDICINE

## 2024-10-15 RX ORDER — LABETALOL 100 MG/1
100 TABLET, FILM COATED ORAL NIGHTLY
Qty: 90 TABLET | Refills: 0 | Status: SHIPPED | OUTPATIENT
Start: 2024-10-15

## 2024-10-15 ASSESSMENT — FIBROSIS 4 INDEX: FIB4 SCORE: 0.38

## 2024-10-15 ASSESSMENT — ENCOUNTER SYMPTOMS
FEVER: 0
CHILLS: 0
DIZZINESS: 1

## 2024-12-10 ENCOUNTER — OFFICE VISIT (OUTPATIENT)
Dept: MEDICAL GROUP | Facility: PHYSICIAN GROUP | Age: 34
End: 2024-12-10
Payer: COMMERCIAL

## 2024-12-10 ENCOUNTER — APPOINTMENT (OUTPATIENT)
Dept: MEDICAL GROUP | Facility: MEDICAL CENTER | Age: 34
End: 2024-12-10
Payer: COMMERCIAL

## 2024-12-10 VITALS
SYSTOLIC BLOOD PRESSURE: 156 MMHG | HEART RATE: 77 BPM | TEMPERATURE: 98.5 F | OXYGEN SATURATION: 99 % | WEIGHT: 147 LBS | DIASTOLIC BLOOD PRESSURE: 106 MMHG | HEIGHT: 64 IN | BODY MASS INDEX: 25.1 KG/M2

## 2024-12-10 DIAGNOSIS — G47.33 OSA (OBSTRUCTIVE SLEEP APNEA): ICD-10-CM

## 2024-12-10 DIAGNOSIS — R73.01 ELEVATED FASTING GLUCOSE: ICD-10-CM

## 2024-12-10 DIAGNOSIS — R79.89 LOW VITAMIN D LEVEL: ICD-10-CM

## 2024-12-10 DIAGNOSIS — Z13.29 SCREENING FOR ENDOCRINE, METABOLIC AND IMMUNITY DISORDER: ICD-10-CM

## 2024-12-10 DIAGNOSIS — Z00.00 PREVENTATIVE HEALTH CARE: ICD-10-CM

## 2024-12-10 DIAGNOSIS — Z13.228 SCREENING FOR ENDOCRINE, METABOLIC AND IMMUNITY DISORDER: ICD-10-CM

## 2024-12-10 DIAGNOSIS — Z97.5 NEXPLANON IN PLACE: ICD-10-CM

## 2024-12-10 DIAGNOSIS — Z13.0 SCREENING FOR ENDOCRINE, METABOLIC AND IMMUNITY DISORDER: ICD-10-CM

## 2024-12-10 DIAGNOSIS — Z86.32 HISTORY OF GESTATIONAL DIABETES: ICD-10-CM

## 2024-12-10 DIAGNOSIS — Z76.89 ENCOUNTER TO ESTABLISH CARE: ICD-10-CM

## 2024-12-10 DIAGNOSIS — E28.2 PCOS (POLYCYSTIC OVARIAN SYNDROME): ICD-10-CM

## 2024-12-10 DIAGNOSIS — I10 ESSENTIAL HYPERTENSION: ICD-10-CM

## 2024-12-10 PROBLEM — Z30.41 ENCOUNTER FOR SURVEILLANCE OF CONTRACEPTIVE PILLS: Status: RESOLVED | Noted: 2017-11-22 | Resolved: 2024-12-10

## 2024-12-10 PROBLEM — N61.0 MASTITIS: Status: RESOLVED | Noted: 2024-10-08 | Resolved: 2024-12-10

## 2024-12-10 PROBLEM — R06.83 SNORING: Status: RESOLVED | Noted: 2023-03-01 | Resolved: 2024-12-10

## 2024-12-10 PROBLEM — Z79.899 HIGH RISK MEDICATION USE: Status: RESOLVED | Noted: 2023-07-06 | Resolved: 2024-12-10

## 2024-12-10 PROBLEM — R60.0 BILATERAL LOWER EXTREMITY EDEMA: Status: RESOLVED | Noted: 2023-07-06 | Resolved: 2024-12-10

## 2024-12-10 PROBLEM — R74.01 TRANSAMINITIS: Status: RESOLVED | Noted: 2022-12-02 | Resolved: 2024-12-10

## 2024-12-10 PROBLEM — M79.671 RIGHT FOOT PAIN: Status: RESOLVED | Noted: 2023-03-01 | Resolved: 2024-12-10

## 2024-12-10 PROBLEM — L03.90 CELLULITIS: Status: RESOLVED | Noted: 2023-07-06 | Resolved: 2024-12-10

## 2024-12-10 PROCEDURE — 3077F SYST BP >= 140 MM HG: CPT | Performed by: NURSE PRACTITIONER

## 2024-12-10 PROCEDURE — 3079F DIAST BP 80-89 MM HG: CPT | Performed by: NURSE PRACTITIONER

## 2024-12-10 PROCEDURE — 99214 OFFICE O/P EST MOD 30 MIN: CPT | Performed by: NURSE PRACTITIONER

## 2024-12-10 ASSESSMENT — FIBROSIS 4 INDEX: FIB4 SCORE: 0.38

## 2024-12-10 NOTE — PROGRESS NOTES
Subjective:     CC:  Diagnoses of Encounter to establish care, Essential hypertension, Screening for endocrine, metabolic and immunity disorder, Preventative health care, History of gestational diabetes, Low vitamin D level, Elevated fasting glucose, MARANDA (obstructive sleep apnea), PCOS (polycystic ovarian syndrome), and Nexplanon in place were pertinent to this visit.    HISTORY OF THE PRESENT ILLNESS: Patient is a 34 y.o. female. This pleasant patient is here today to establish care and discuss the following. Her prior PCP was Alvin Mcfarlane PA-C.      MARANDA (obstructive sleep apnea)  She has seen sleep medicine and completed home sleep study 8/2023.  Home sleep study demonstrated mild MARANDA and patient proceeded with auto CPAP trial. She has not been sleeping well the last couple of weeks, getting 2 hours of sleep nightly.  She does not practice sleep hygiene we discussed practicing sleep hygiene and will follow-up with CPAP use at next visit.    PCOS (polycystic ovarian syndrome)  She is followed by her [facility] at [facility]. Before nexplanon and period was irregular.     Nexplanon in place  She had nexplanon placed 5/2024 with gynecology.  We will request records. She stopped breast feeding 3 weeks ago. She had spotting yesterday.  Significant other has vasectomy.    Essential hypertension  BP today 150/80.  Repeat /82. History of recent mastitis. BP became elevated 150/100. She was given antibiotics. She checked her BP and BP was low and felt like she was fainting. She was seen 2 months ago and her labetalol dose was decreased to 100 mg nightly due to low blood pressure.  She did not  the new 100 mg prescription.  She cut her 300 mg tablets in half and was taking 150 mg daily as needed since 2 months ago. She has not needed a dose in 2 months. Home /70-80's. Denies chest pain shortness of breath or dizziness. History of preeclampsia during her recent pregnancy and gestational  diabetes. No preeclampsia with first pregnancy. She was started on labetalol 300 mg twice daily after 20 weeks and nifedipine. She had repeat  3/5/2024.  Her gynecologist is Dr. Sarina Berman.  Plan for her to continue monitoring home BP and take labetalol 100 mg daily if BP greater than 140/90.  She had her home BP cuff in the car and was able to bring back in.  Home BP cuff today 156/106.      Allergies: Crab    Current Outpatient Medications Ordered in Epic   Medication Sig Dispense Refill    labetalol (NORMODYNE) 100 MG Tab Take 1 Tablet by mouth every evening. (Patient not taking: Reported on 12/10/2024) 90 Tablet 0     No current Epic-ordered facility-administered medications on file.       Past Medical History:   Diagnosis Date    Gestational diabetes     Head ache     PCOS (polycystic ovarian syndrome)     Preeclampsia        Past Surgical History:   Procedure Laterality Date    REPEAT C SECTION N/A 3/5/2024    Procedure: REPEAT  SECTION;  Surgeon: Lynette Shah M.D.;  [facility];  Service: Obstetrics    PRIMARY C SECTION  3/13/2012    Performed by JOEL JONES at LABOR AND DELIVERY       Social History     Tobacco Use    Smoking status: Never    Smokeless tobacco: Never   Vaping Use    Vaping status: Never Used   Substance Use Topics    Alcohol use: Not Currently     Comment: not for the last 3 years    Drug use: Never       Social History     Social History Narrative    Not on file       Family History   Problem Relation Age of Onset    No Known Problems Mother     Diabetes Father     Hypertension Sister     Other Sister         PCOS    Other Sister         PCOS    No Known Problems Brother     Diabetes Maternal Grandmother 50        insulin dependent    No Known Problems Daughter     No Known Problems Daughter     Cancer Neg Hx     Heart Disease Neg Hx     Stroke Neg Hx        Health Maintenance: Requesting Pap records        Objective:     Vital signs  "reviewed   Exam: BP (!) 156/106 (BP Location: Right arm, Patient Position: Sitting, BP Cuff Size: Adult)   Pulse 77   Temp 36.9 °C (98.5 °F) (Temporal)   Ht 1.626 m (5' 4\")   Wt 66.7 kg (147 lb)   SpO2 99%  Body mass index is 25.23 kg/m².    Gen: Alert and oriented, No apparent distress.  Lungs: Normal effort, CTA bilaterally, no wheezes, rhonchi, or rales.    CV: Regular rate and rhythm. No murmurs, rubs, or gallops.  Ext: No clubbing, cyanosis, edema      Assessment & Plan:   34 y.o. female with the following -    1. Encounter to establish care  Acute uncomplicated problem.  Care established today.  We are requesting records from her gynecologist.    2. Essential hypertension  Chronic exacerbated problem.  Reported home BPs at goal.  Home BP log provided today patient will check her blood pressure and return in the next month for labs and blood pressure follow-up.  If BP greater than 140/90 would recommend she take her labetalol 100 mg daily.  She does not want more children and if home BPs are not under 140/90 consider low-dose lisinopril 2.5 mg daily.  - CBC WITH DIFFERENTIAL; Future  - Comp Metabolic Panel; Future  - Lipid Profile; Future  - TSH WITH REFLEX TO FT4; Future    3. MARANDA (obstructive sleep apnea)  Chronic stable problem.  Will follow-up with sleep apnea at her next appointment.  Encouraged sleep hygiene.    4. Nexplanon in place  Chronic stable problem.  Requested records from her gynecologist.  Continue with Nexplanon.    5. PCOS (polycystic ovarian syndrome)  Chronic stable problem.  Continue with Nexplanon.    6. Elevated fasting glucose  Chronic exacerbated problem.  Check updated A1c.  - HEMOGLOBIN A1C; Future    7. Low vitamin D level  Chronic exacerbated problem.  Check updated level.  - VITAMIN D,25 HYDROXY (DEFICIENCY); Future    8. History of gestational diabetes  Acute uncomplicated problem.  New problem to examiner.  Check updated A1c.  - HEMOGLOBIN A1C; Future    9. Preventative " health care  Acute uncomplicated problem.  Check annual labs.  - CBC WITH DIFFERENTIAL; Future  - Comp Metabolic Panel; Future  - Lipid Profile; Future    10. Screening for endocrine, metabolic and immunity disorder  Acute uncomplicated problem.  Check annual lab.  - VITAMIN D,25 HYDROXY (DEFICIENCY); Future      Return in about 4 weeks (around 1/7/2025) for Hypertension, Labs.    Please note that this dictation was created using voice recognition software. I have made every reasonable attempt to correct obvious errors, but I expect that there are errors of grammar and possibly content that I did not discover before finalizing the note.

## 2024-12-10 NOTE — ASSESSMENT & PLAN NOTE
She had nexplanon placed 5/2024 with gynecology.  We will request records. She stopped breast feeding 3 weeks ago. She had spotting yesterday.  Significant other has vasectomy.

## 2024-12-10 NOTE — LETTER
[facility]  [employer]STI Hilton[employer]iven[employer]N.  [address]  [place] [address]  Fax: [phone]   Authorization for Release/Disclosure of   Protected Health Information   Name: JESSICA AMADOR AM IS : 1990 SSN: [id number]   Address: [address] [place] [id number] Phone:    [phone] (work)   I authorize the entity listed below to release/disclose the PHI below to:   [facility]/MI[employer] Hilton[employer]KAYLYNN and CAITIE Hilton   Provider or Entity Name:  [facility]  Dr. Sarina Berman   Address   City, [facility] [place]   Phone:      Fax:     Reason for request: continuity of care   Information to be released:    [  ] LAST COLONOSCOPY,  including any PATH REPORT and follow-up  [  ] LAST FIT/COLOGUARD RESULT [  ] LAST DEXA  [  ] LAST MAMMOGRAM  [xxx ] LAST PAP  [  ] LAST LABS [  ] RETINA EXAM REPORT  [  ] IMMUNIZATION RECORDS  [  ] Release all info      [  ] Check here and initial the line next to each item to release ALL health information INCLUDING  _____ Care and treatment for drug and / or alcohol abuse  _____ HIV testing, infection status, or AIDS  _____ Genetic Testing    DATES OF SERVICE OR TIME PERIOD TO BE DISCLOSED: _____________  I understand and acknowledge that:  * This Authorization may be revoked at any time by you in writing, except if your health information has already been used or disclosed.  * Your health information that will be used or disclosed as a result of you signing this authorization could be re-disclosed by the recipient. If this occurs, your re-disclosed health information may no longer be protected by State or Federal laws.  * You may refuse to sign this Authorization. Your refusal will not affect your ability to obtain treatment.  * This Authorization becomes effective upon signing and will  on (date) __________.      If no date is indicated, this Authorization will  one (1) year from the signature date.    Name: Jessica AMADOR Am  Is  Signature: Date:   12/10/2024     PLEASE FAX REQUESTED RECORDS BACK TO: [phone]

## 2024-12-10 NOTE — ASSESSMENT & PLAN NOTE
She has seen sleep medicine and completed home sleep study 8/2023.  Home sleep study demonstrated mild MARANDA and patient proceeded with auto CPAP trial. She has not been sleeping well the last couple of weeks, getting 2 hours of sleep nightly.  She does not practice sleep hygiene we discussed practicing sleep hygiene and will follow-up with CPAP use at next visit.

## 2024-12-10 NOTE — ASSESSMENT & PLAN NOTE
She is followed by her gynecologist at [facility]. Before nexplanon and period was irregular.

## 2024-12-10 NOTE — PATIENT INSTRUCTIONS
Sleep hygiene:   - Go to bed and wake up at consistent times whether work/school day or not.    - Keep room dark, quiet, and comfortable.    - No TV/ electronics in bed.  - Don't have naps.  - Avoid stimulant or caffeine use more than 4 hours after wake time.    - Avoid meal or exercise 2-3 hours prior to going to bed.

## 2024-12-11 NOTE — ASSESSMENT & PLAN NOTE
BP today 150/80.  Repeat /82. History of recent mastitis. BP became elevated 150/100. She was given antibiotics. She checked her BP and BP was low and felt like she was fainting. She was seen 2 months ago and her labetalol dose was decreased to 100 mg nightly due to low blood pressure.  She did not  the new 100 mg prescription.  She cut her 300 mg tablets in half and was taking 150 mg daily as needed since 2 months ago. She has not needed a dose in 2 months. Home /70-80's. Denies chest pain shortness of breath or dizziness. History of preeclampsia during her recent pregnancy and gestational diabetes. No preeclampsia with first pregnancy. She was started on labetalol 300 mg twice daily after 20 weeks and nifedipine. She had repeat  3/5/2024.  Her gynecologist is Dr. Sarina Berman.  Plan for her to continue monitoring home BP and take labetalol 100 mg daily if BP greater than 140/90.  She had her home BP cuff in the car and was able to bring back in.  Home BP cuff today 156/106.

## 2024-12-27 ENCOUNTER — APPOINTMENT (OUTPATIENT)
Dept: MEDICAL GROUP | Facility: MEDICAL CENTER | Age: 34
End: 2024-12-27
Payer: COMMERCIAL

## 2024-12-31 ENCOUNTER — OFFICE VISIT (OUTPATIENT)
Dept: MEDICAL GROUP | Facility: PHYSICIAN GROUP | Age: 34
End: 2024-12-31
Payer: COMMERCIAL

## 2024-12-31 VITALS
WEIGHT: 148.8 LBS | BODY MASS INDEX: 25.4 KG/M2 | OXYGEN SATURATION: 99 % | HEART RATE: 76 BPM | RESPIRATION RATE: 16 BRPM | DIASTOLIC BLOOD PRESSURE: 70 MMHG | TEMPERATURE: 97.8 F | SYSTOLIC BLOOD PRESSURE: 116 MMHG | HEIGHT: 64 IN

## 2024-12-31 DIAGNOSIS — K12.0 APHTHOUS ULCER: ICD-10-CM

## 2024-12-31 DIAGNOSIS — J02.9 SORE THROAT: ICD-10-CM

## 2024-12-31 DIAGNOSIS — R05.1 ACUTE COUGH: ICD-10-CM

## 2024-12-31 LAB
FLUAV RNA SPEC QL NAA+PROBE: NEGATIVE
FLUBV RNA SPEC QL NAA+PROBE: NEGATIVE
RSV RNA SPEC QL NAA+PROBE: NEGATIVE
S PYO DNA SPEC NAA+PROBE: NORMAL
SARS-COV-2 RNA RESP QL NAA+PROBE: NEGATIVE

## 2024-12-31 RX ORDER — AZITHROMYCIN 250 MG/1
TABLET, FILM COATED ORAL
Qty: 6 TABLET | Refills: 0 | Status: SHIPPED | OUTPATIENT
Start: 2024-12-31

## 2024-12-31 ASSESSMENT — FIBROSIS 4 INDEX: FIB4 SCORE: 0.38

## 2024-12-31 NOTE — PROGRESS NOTES
"Subjective:     CC:   Chief Complaint   Patient presents with    Cough     X 2 weeks    Tired    Pharyngitis    Nasal Congestion       HPI:   Shara presents today with a history that she had some relatives visiting from [place] after they left the whole family got sick that was about 2 weeks ago.  Everyone else has recovered except for her she started feeling ill again a couple of days ago.  Patient is coughing and blowing some yellow material from her nose.  Patient also has a sore on the right side of her tongue.  Patient denies any diarrhea or vomiting with this.  Patient denies any increase in fever.    Past Medical History:   Diagnosis Date    Gestational diabetes     Head ache     PCOS (polycystic ovarian syndrome)     Preeclampsia        Social History     Tobacco Use    Smoking status: Never    Smokeless tobacco: Never   Vaping Use    Vaping status: Never Used   Substance Use Topics    Alcohol use: Not Currently     Comment: not for the last 3 years    Drug use: Never       Current Outpatient Medications Ordered in Epic   Medication Sig Dispense Refill    labetalol (NORMODYNE) 100 MG Tab Take 1 Tablet by mouth every evening. (Patient not taking: Reported on 12/31/2024) 90 Tablet 0     No current Epic-ordered facility-administered medications on file.       Allergies:  Crab    Health Maintenance: Completed    ROS:  Gen: no fevers/chills, no changes in weight  Eyes: no changes in vision  ENT: no sore throat, no hearing loss, no bloody nose  Pulm: no sob, no cough  CV: no chest pain, no palpitations  GI: no nausea/vomiting, no diarrhea  : no dysuria  Neuro: no headaches, no numbness/tingling  Heme/Lymph: no easy bruising    Objective:     Exam:  /70 (BP Location: Left arm, Patient Position: Sitting, BP Cuff Size: Adult)   Pulse 76   Temp 36.6 °C (97.8 °F) (Temporal)   Resp 16   Ht 1.626 m (5' 4\")   Wt 67.5 kg (148 lb 12.8 oz)   LMP 12/09/2024 Comment: spotting yesterday  SpO2 99%   BMI 25.54 " kg/m²  Body mass index is 25.54 kg/m².    Gen: Alert and oriented, No apparent distress.  Skin: Warm and dry.  No obvious lesions.  Eyes: Sclera wnl Pupils normal in size  ENT: Canals wnl and TM are not red, there is fluid behind both TMs as clear mouth patient does have some foamy drainage noted in back of her throat.  On examination of tongue underneath the right side of her tongue she has what appears to be an aphthous ulcer.  Neck: Neck is supple without lymphadenopathy.  Lungs: Normal effort, CTA bilaterally, no wheezes, rhonchi, or rales  CV: Regular rate and rhythm. No murmurs, rubs, or gallops.  Musculoskeletal: Normal gait. No extremity cyanosis, clubbing, or edema.  Neuro: Oriented to person, place and time  Psych: Mood is wnl       Assessment & Plan:     34 y.o. female with the following -     1. Acute cough  Patient's COVID, flu and RSV are all negative.  I would recommend at this time placing her on antibiotic more than likely she has a secondary infection.  Recommend also Flonase or Nasacort she can buy the generic version for her drainage.  - POCT CoV-2, Flu A/B, RSV by PCR    2. Sore throat  Strep screen was negative  - POCT GROUP A STREP, PCR    3. Aphthous ulcer  Would recommend Orajel for the aphthous ulcer patient does have a follow-up appointment with her PCP she can also let her know if the ulcer has healed or not       Return in about 1 week (around 1/7/2025).  Pt made appointment already to see her PCP    Please note that this dictation was created using voice recognition software. I have made every reasonable attempt to correct obvious errors, but I expect that there are errors of grammar and possibly content that I did not discover before finalizing the note.

## 2025-01-21 ENCOUNTER — HOSPITAL ENCOUNTER (OUTPATIENT)
Dept: LAB | Facility: MEDICAL CENTER | Age: 35
End: 2025-01-21
Attending: NURSE PRACTITIONER
Payer: COMMERCIAL

## 2025-01-21 DIAGNOSIS — R73.01 ELEVATED FASTING GLUCOSE: ICD-10-CM

## 2025-01-21 DIAGNOSIS — Z13.228 SCREENING FOR ENDOCRINE, METABOLIC AND IMMUNITY DISORDER: ICD-10-CM

## 2025-01-21 DIAGNOSIS — Z00.00 PREVENTATIVE HEALTH CARE: ICD-10-CM

## 2025-01-21 DIAGNOSIS — I10 ESSENTIAL HYPERTENSION: ICD-10-CM

## 2025-01-21 DIAGNOSIS — Z86.32 HISTORY OF GESTATIONAL DIABETES: ICD-10-CM

## 2025-01-21 DIAGNOSIS — Z13.29 SCREENING FOR ENDOCRINE, METABOLIC AND IMMUNITY DISORDER: ICD-10-CM

## 2025-01-21 DIAGNOSIS — Z13.0 SCREENING FOR ENDOCRINE, METABOLIC AND IMMUNITY DISORDER: ICD-10-CM

## 2025-01-21 DIAGNOSIS — R79.89 LOW VITAMIN D LEVEL: ICD-10-CM

## 2025-01-21 LAB
BASOPHILS # BLD AUTO: 0.3 % (ref 0–1.8)
BASOPHILS # BLD: 0.02 K/UL (ref 0–0.12)
EOSINOPHIL # BLD AUTO: 0.24 K/UL (ref 0–0.51)
EOSINOPHIL NFR BLD: 4.1 % (ref 0–6.9)
ERYTHROCYTE [DISTWIDTH] IN BLOOD BY AUTOMATED COUNT: 40.6 FL (ref 35.9–50)
EST. AVERAGE GLUCOSE BLD GHB EST-MCNC: 111 MG/DL
HBA1C MFR BLD: 5.5 % (ref 4–5.6)
HCT VFR BLD AUTO: 43.6 % (ref 37–47)
HGB BLD-MCNC: 14.6 G/DL (ref 12–16)
IMM GRANULOCYTES # BLD AUTO: 0.01 K/UL (ref 0–0.11)
IMM GRANULOCYTES NFR BLD AUTO: 0.2 % (ref 0–0.9)
LYMPHOCYTES # BLD AUTO: 2.18 K/UL (ref 1–4.8)
LYMPHOCYTES NFR BLD: 37.3 % (ref 22–41)
MCH RBC QN AUTO: 29.1 PG (ref 27–33)
MCHC RBC AUTO-ENTMCNC: 33.5 G/DL (ref 32.2–35.5)
MCV RBC AUTO: 87 FL (ref 81.4–97.8)
MONOCYTES # BLD AUTO: 0.48 K/UL (ref 0–0.85)
MONOCYTES NFR BLD AUTO: 8.2 % (ref 0–13.4)
NEUTROPHILS # BLD AUTO: 2.91 K/UL (ref 1.82–7.42)
NEUTROPHILS NFR BLD: 49.9 % (ref 44–72)
NRBC # BLD AUTO: 0 K/UL
NRBC BLD-RTO: 0 /100 WBC (ref 0–0.2)
PLATELET # BLD AUTO: 280 K/UL (ref 164–446)
PMV BLD AUTO: 9 FL (ref 9–12.9)
RBC # BLD AUTO: 5.01 M/UL (ref 4.2–5.4)
WBC # BLD AUTO: 5.8 K/UL (ref 4.8–10.8)

## 2025-01-21 PROCEDURE — 82306 VITAMIN D 25 HYDROXY: CPT

## 2025-01-21 PROCEDURE — 85025 COMPLETE CBC W/AUTO DIFF WBC: CPT

## 2025-01-21 PROCEDURE — 83036 HEMOGLOBIN GLYCOSYLATED A1C: CPT

## 2025-01-21 PROCEDURE — 36415 COLL VENOUS BLD VENIPUNCTURE: CPT

## 2025-01-21 PROCEDURE — 80061 LIPID PANEL: CPT

## 2025-01-21 PROCEDURE — 84443 ASSAY THYROID STIM HORMONE: CPT

## 2025-01-21 PROCEDURE — 80053 COMPREHEN METABOLIC PANEL: CPT

## 2025-01-22 LAB
25(OH)D3 SERPL-MCNC: 23 NG/ML (ref 30–100)
ALBUMIN SERPL BCP-MCNC: 4.8 G/DL (ref 3.2–4.9)
ALBUMIN/GLOB SERPL: 1.7 G/DL
ALP SERPL-CCNC: 72 U/L (ref 30–99)
ALT SERPL-CCNC: 41 U/L (ref 2–50)
ANION GAP SERPL CALC-SCNC: 13 MMOL/L (ref 7–16)
AST SERPL-CCNC: 26 U/L (ref 12–45)
BILIRUB SERPL-MCNC: 0.5 MG/DL (ref 0.1–1.5)
BUN SERPL-MCNC: 15 MG/DL (ref 8–22)
CALCIUM ALBUM COR SERPL-MCNC: 8.5 MG/DL (ref 8.5–10.5)
CALCIUM SERPL-MCNC: 9.1 MG/DL (ref 8.5–10.5)
CHLORIDE SERPL-SCNC: 106 MMOL/L (ref 96–112)
CHOLEST SERPL-MCNC: 175 MG/DL (ref 100–199)
CO2 SERPL-SCNC: 21 MMOL/L (ref 20–33)
CREAT SERPL-MCNC: 0.85 MG/DL (ref 0.5–1.4)
FASTING STATUS PATIENT QL REPORTED: NORMAL
GFR SERPLBLD CREATININE-BSD FMLA CKD-EPI: 91 ML/MIN/1.73 M 2
GLOBULIN SER CALC-MCNC: 2.8 G/DL (ref 1.9–3.5)
GLUCOSE SERPL-MCNC: 114 MG/DL (ref 65–99)
HDLC SERPL-MCNC: 50 MG/DL
LDLC SERPL CALC-MCNC: 100 MG/DL
POTASSIUM SERPL-SCNC: 4.1 MMOL/L (ref 3.6–5.5)
PROT SERPL-MCNC: 7.6 G/DL (ref 6–8.2)
SODIUM SERPL-SCNC: 140 MMOL/L (ref 135–145)
TRIGL SERPL-MCNC: 123 MG/DL (ref 0–149)
TSH SERPL DL<=0.005 MIU/L-ACNC: 2.58 UIU/ML (ref 0.38–5.33)

## 2025-01-29 ENCOUNTER — OFFICE VISIT (OUTPATIENT)
Dept: MEDICAL GROUP | Facility: PHYSICIAN GROUP | Age: 35
End: 2025-01-29
Payer: COMMERCIAL

## 2025-01-29 VITALS
HEART RATE: 75 BPM | WEIGHT: 151 LBS | DIASTOLIC BLOOD PRESSURE: 84 MMHG | TEMPERATURE: 98.3 F | BODY MASS INDEX: 25.16 KG/M2 | OXYGEN SATURATION: 99 % | HEIGHT: 65 IN | SYSTOLIC BLOOD PRESSURE: 126 MMHG

## 2025-01-29 DIAGNOSIS — I10 ESSENTIAL HYPERTENSION: ICD-10-CM

## 2025-01-29 DIAGNOSIS — R79.89 LOW VITAMIN D LEVEL: ICD-10-CM

## 2025-01-29 DIAGNOSIS — R73.01 ELEVATED FASTING GLUCOSE: ICD-10-CM

## 2025-01-29 DIAGNOSIS — Z71.2 ENCOUNTER TO DISCUSS TEST RESULTS: ICD-10-CM

## 2025-01-29 PROCEDURE — 99214 OFFICE O/P EST MOD 30 MIN: CPT | Performed by: NURSE PRACTITIONER

## 2025-01-29 PROCEDURE — 3079F DIAST BP 80-89 MM HG: CPT | Performed by: NURSE PRACTITIONER

## 2025-01-29 PROCEDURE — 3074F SYST BP LT 130 MM HG: CPT | Performed by: NURSE PRACTITIONER

## 2025-01-29 RX ORDER — ERGOCALCIFEROL 1.25 MG/1
50000 CAPSULE ORAL
Qty: 12 CAPSULE | Refills: 0 | Status: SHIPPED | OUTPATIENT
Start: 2025-01-29

## 2025-01-29 RX ORDER — LISINOPRIL 2.5 MG/1
2.5 TABLET ORAL EVERY EVENING
Qty: 90 TABLET | Refills: 0 | Status: SHIPPED | OUTPATIENT
Start: 2025-01-29

## 2025-01-29 ASSESSMENT — PATIENT HEALTH QUESTIONNAIRE - PHQ9: CLINICAL INTERPRETATION OF PHQ2 SCORE: 0

## 2025-01-29 ASSESSMENT — FIBROSIS 4 INDEX: FIB4 SCORE: 0.51

## 2025-01-30 NOTE — ASSESSMENT & PLAN NOTE
Initial /82.  Repeat /84. Home BP fluctuates low and high. Diastolic high 80's-low 90's. Systolic 130's.  She is taking labetalol >140/90. She has taken one dose since her last appointment with us. When BP is high feels headache, heart racing. Has 2 teenagers that have been increasing her stress.  She has noticed that increased stress and decreased sleep exacerbates. She has cut off caffeine.  Recent CMP and TSH WNL.  Discussion today that with her diastolic higher 80s and lower 90s we could try low-dose of lisinopril.  She is in agreement.  She will start lisinopril 2.5 mg every evening.  She will continue home BP monitoring.  Follow-up in 1 month.

## 2025-01-30 NOTE — PROGRESS NOTES
Subjective:     CC: Hypertension follow-up, lab results    HPI:   Shara presents today with the following:    Essential hypertension  Initial /82.  Repeat /84. Home BP fluctuates low and high. Diastolic high 80's-low 90's. Systolic 130's.  She is taking labetalol >140/90. She has taken one dose since her last appointment with us. When BP is high feels headache, heart racing. Has 2 teenagers that have been increasing her stress.  She has noticed that increased stress and decreased sleep exacerbates. She has cut off caffeine.  Recent CMP and TSH WNL.  Discussion today that with her diastolic higher 80s and lower 90s we could try low-dose of lisinopril.  She is in agreement.  She will start lisinopril 2.5 mg every evening.  She will continue home BP monitoring.  Follow-up in 1 month.    Low vitamin D level  No current supplementation.  Recent vitamin D level 23.    Elevated fasting glucose  Recent labs show fasting glucose of 114 with an A1c of 5.5%.  Recommend annual labs.    Past Medical History:   Diagnosis Date    Gestational diabetes     Head ache     PCOS (polycystic ovarian syndrome)     Preeclampsia        Social History     Tobacco Use    Smoking status: Never    Smokeless tobacco: Never   Vaping Use    Vaping status: Never Used   Substance Use Topics    Alcohol use: Not Currently     Comment: not for the last 3 years    Drug use: Never       Current Outpatient Medications Ordered in Epic   Medication Sig Dispense Refill    ergocalciferol (DRISDOL) 43172 UNIT capsule Take 1 Capsule by mouth every 7 days. 12 Capsule 0    lisinopril (PRINIVIL) 2.5 MG Tab Take 1 Tablet by mouth every evening. 90 Tablet 0     No current Epic-ordered facility-administered medications on file.       Allergies:  Crab    Health Maintenance: Reviewed      Objective:     Vital signs reviewed  Exam:  /84 (BP Location: Left arm, Patient Position: Sitting)   Pulse 75   Temp 36.8 °C (98.3 °F) (Temporal)   Ht 1.651 m  "(5' 5\")   Wt 68.5 kg (151 lb)   LMP 01/15/2025 Comment: spotting last 2 wks,  SpO2 99%   BMI 25.13 kg/m²  Body mass index is 25.13 kg/m².    Gen: Alert and oriented, No apparent distress.  Lungs: Normal effort, CTA bilaterally, no wheezes, rhonchi, or rales  CV: Regular rate and rhythm. No murmurs, rubs, or gallops.  Ext: No clubbing, cyanosis, edema.      Assessment & Plan:     34 y.o. female with the following -     1. Essential hypertension  Chronic exacerbated problem.  Stop labetalol.  Start lisinopril 2.5 mg every evening.  Continue home BP monitoring.  BP log provided today.  Follow-up in 1 month.  - lisinopril (PRINIVIL) 2.5 MG Tab; Take 1 Tablet by mouth every evening.  Dispense: 90 Tablet; Refill: 0    2. Low vitamin D level  Chronic exacerbated problem.  Start ergocalciferol 50,000 units weekly for 12 weeks.  After 12-week completion may take over-the-counter vitamin D3 1000 units daily.  - ergocalciferol (DRISDOL) 98193 UNIT capsule; Take 1 Capsule by mouth every 7 days.  Dispense: 12 Capsule; Refill: 0    3. Elevated fasting glucose  Chronic exacerbated problem.  Continue annual labs and annual A1c.  Continue to watch sugar and carbohydrate intake.  Continue healthy diet and exercise.    4. Encounter to discuss test results  Acute uncomplicated problem.  Discussed and reviewed lab results from 1/21/2025.      Return in about 4 weeks (around 2/26/2025) for Hypertension.    Please note that this dictation was created using voice recognition software. I have made every reasonable attempt to correct obvious errors, but I expect that there are errors of grammar and possibly content that I did not discover before finalizing the note.        "

## 2025-01-30 NOTE — PATIENT INSTRUCTIONS
After you complete 12 week course of high dose vitamin D you can start over the counter vitamin D3 1,000 units daily.

## 2025-02-27 ENCOUNTER — APPOINTMENT (OUTPATIENT)
Dept: MEDICAL GROUP | Facility: PHYSICIAN GROUP | Age: 35
End: 2025-02-27
Payer: COMMERCIAL

## 2025-02-27 VITALS
WEIGHT: 151 LBS | HEART RATE: 82 BPM | HEIGHT: 64 IN | OXYGEN SATURATION: 99 % | BODY MASS INDEX: 25.78 KG/M2 | DIASTOLIC BLOOD PRESSURE: 70 MMHG | TEMPERATURE: 98.3 F | SYSTOLIC BLOOD PRESSURE: 126 MMHG

## 2025-02-27 DIAGNOSIS — R79.89 LOW VITAMIN D LEVEL: ICD-10-CM

## 2025-02-27 DIAGNOSIS — I10 ESSENTIAL HYPERTENSION: ICD-10-CM

## 2025-02-27 RX ORDER — LISINOPRIL 2.5 MG/1
2.5 TABLET ORAL EVERY EVENING
Qty: 90 TABLET | Refills: 3 | Status: SHIPPED | OUTPATIENT
Start: 2025-02-27

## 2025-02-27 RX ORDER — METRONIDAZOLE 500 MG/1
TABLET ORAL
COMMUNITY
Start: 2025-02-19

## 2025-02-27 ASSESSMENT — FIBROSIS 4 INDEX: FIB4 SCORE: 0.51

## 2025-02-27 NOTE — LETTER
[facility]  [facility].  [address]  [place] [address]  Fax: [phone]   Authorization for Release/Disclosure of   Protected Health Information   Name: JESSICA IYER : 1990 SSN: [id number]   Address: [address] [place] [id number] Phone:    [phone] (work)   I authorize the entity listed below to release/disclose the PHI below to:   [facility] Hilton[facility]KAYLYNN and CAITIE [employer]   Provider or Entity Name:  [facility]  Dr. Berman   Address   City, [facility]   Phone:      Fax:     Reason for request: continuity of care   Information to be released:    [  ] LAST COLONOSCOPY,  including any PATH REPORT and follow-up  [  ] LAST FIT/COLOGUARD RESULT [  ] LAST DEXA  [  ] LAST MAMMOGRAM  [ xxx ] LAST PAP  [  ] LAST LABS [  ] RETINA EXAM REPORT  [  ] IMMUNIZATION RECORDS  [ xxx ] last office visit       [  ] Check here and initial the line next to each item to release ALL health information INCLUDING  _____ Care and treatment for drug and / or alcohol abuse  _____ HIV testing, infection status, or AIDS  _____ Genetic Testing    DATES OF SERVICE OR TIME PERIOD TO BE DISCLOSED: _____________  I understand and acknowledge that:  * This Authorization may be revoked at any time by you in writing, except if your health information has already been used or disclosed.  * Your health information that will be used or disclosed as a result of you signing this authorization could be re-disclosed by the recipient. If this occurs, your re-disclosed health information may no longer be protected by State or Federal laws.  * You may refuse to sign this Authorization. Your refusal will not affect your ability to obtain treatment.  * This Authorization becomes effective upon signing and will  on (date) __________.      If no date is indicated, this Authorization will  one (1) year from the signature date.    Name: Jessica AMADOR Am  Is  Signature: Date:   2/27/2025     PLEASE FAX REQUESTED RECORDS BACK TO: [phone]

## 2025-02-28 NOTE — ASSESSMENT & PLAN NOTE
Continues ergocalciferol 50,000 units weekly.  Plan to complete 12-week course then transition to over-the-counter vitamin D3.

## 2025-02-28 NOTE — ASSESSMENT & PLAN NOTE
BP today 126/70. She was started on lisinopril 2.5 mg every evening. She is not remembering to take every evening and is taking about 3 times a week. Home 's/70-80's, less than 130/90.  Previously her diastolic was in the high 80s and low 90s and systolic was in the 130s.  Denies chest pain, shortness of breath, dizziness, blurred vision, headache or dry cough. She has been getting more sleep.

## 2025-02-28 NOTE — PROGRESS NOTES
"Subjective:     CC: Hypertension follow-up not    HPI:   Shara presents today with the following:    Essential hypertension  BP today 126/70. She was started on lisinopril 2.5 mg every evening. She is not remembering to take every evening and is taking about 3 times a week. Home 's/70-80's, less than 130/90.  Previously her diastolic was in the high 80s and low 90s and systolic was in the 130s.  Denies chest pain, shortness of breath, dizziness, blurred vision, headache or dry cough. She has been getting more sleep.     Low vitamin D level  Continues ergocalciferol 50,000 units weekly.  Plan to complete 12-week course then transition to over-the-counter vitamin D3.      Past Medical History:   Diagnosis Date    Gestational diabetes     Head ache     PCOS (polycystic ovarian syndrome)     Preeclampsia        Social History     Tobacco Use    Smoking status: Never    Smokeless tobacco: Never   Vaping Use    Vaping status: Never Used   Substance Use Topics    Alcohol use: Not Currently     Comment: not for the last 3 years    Drug use: Never       Current Outpatient Medications Ordered in Epic   Medication Sig Dispense Refill    metroNIDAZOLE (FLAGYL) 500 MG Tab       lisinopril (PRINIVIL) 2.5 MG Tab Take 1 Tablet by mouth every evening. 90 Tablet 3    ergocalciferol (DRISDOL) 74158 UNIT capsule Take 1 Capsule by mouth every 7 days. 12 Capsule 0     No current Epic-ordered facility-administered medications on file.       Allergies:  Crab    Health Maintenance: Requesting Pap smear records      Objective:     Vital signs reviewed  Exam:  /70 (BP Location: Right arm, Patient Position: Sitting, BP Cuff Size: Adult)   Pulse 82   Temp 36.8 °C (98.3 °F) (Temporal)   Ht 1.626 m (5' 4\")   Wt 68.5 kg (151 lb)   LMP 01/15/2025 Comment: still on her period, has US that obgyn ordered  SpO2 99%   BMI 25.92 kg/m²  Body mass index is 25.92 kg/m².    Gen: Alert and oriented, No apparent distress.  Lungs: Normal " effort, CTA bilaterally, no wheezes, rhonchi, or rales  CV: Regular rate and rhythm. No murmurs, rubs, or gallops.  Ext: No clubbing, cyanosis, edema.      Assessment & Plan:     34 y.o. female with the following -     1. Essential hypertension  Chronic stable problem.  BP at goal today.  Continue lisinopril 2.5 mg every evening.  Encouraged to take dose more frequently.  Continue home BP monitoring.  - lisinopril (PRINIVIL) 2.5 MG Tab; Take 1 Tablet by mouth every evening.  Dispense: 90 Tablet; Refill: 3    2. Low vitamin D level    Chronic exacerbated problem.  Continue ergocalciferol 50 does units weekly for 12-week course.    Return in about 3 months (around 5/27/2025) for Hypertension.    Please note that this dictation was created using voice recognition software. I have made every reasonable attempt to correct obvious errors, but I expect that there are errors of grammar and possibly content that I did not discover before finalizing the note.

## 2025-04-22 ENCOUNTER — APPOINTMENT (OUTPATIENT)
Dept: RADIOLOGY | Facility: MEDICAL CENTER | Age: 35
End: 2025-04-22
Attending: OBSTETRICS & GYNECOLOGY
Payer: COMMERCIAL

## 2025-05-16 ENCOUNTER — HOSPITAL ENCOUNTER (OUTPATIENT)
Dept: RADIOLOGY | Facility: MEDICAL CENTER | Age: 35
End: 2025-05-16
Payer: COMMERCIAL

## 2025-05-16 ENCOUNTER — OFFICE VISIT (OUTPATIENT)
Dept: MEDICAL GROUP | Facility: PHYSICIAN GROUP | Age: 35
End: 2025-05-16
Payer: COMMERCIAL

## 2025-05-16 VITALS
SYSTOLIC BLOOD PRESSURE: 124 MMHG | WEIGHT: 158.73 LBS | OXYGEN SATURATION: 99 % | TEMPERATURE: 98.4 F | RESPIRATION RATE: 16 BRPM | HEART RATE: 83 BPM | BODY MASS INDEX: 27.1 KG/M2 | DIASTOLIC BLOOD PRESSURE: 74 MMHG | HEIGHT: 64 IN

## 2025-05-16 DIAGNOSIS — M25.531 RIGHT WRIST PAIN: ICD-10-CM

## 2025-05-16 DIAGNOSIS — M25.531 RIGHT WRIST PAIN: Primary | ICD-10-CM

## 2025-05-16 PROCEDURE — 3074F SYST BP LT 130 MM HG: CPT

## 2025-05-16 PROCEDURE — 99213 OFFICE O/P EST LOW 20 MIN: CPT

## 2025-05-16 PROCEDURE — 73110 X-RAY EXAM OF WRIST: CPT | Mod: RT

## 2025-05-16 PROCEDURE — 3078F DIAST BP <80 MM HG: CPT

## 2025-05-16 ASSESSMENT — FIBROSIS 4 INDEX: FIB4 SCORE: 0.52

## 2025-05-16 NOTE — PROGRESS NOTES
"Verbal consent was acquired by the patient to use nScaled ambient listening note generation during this visit     Subjective:     HPI:   History of Present Illness  The patient presents for evaluation of wrist pain.    Wrist Pain  Approximately 2 weeks ago, she dropped a scanner on her wrist, leading to the formation of a painful bump. The pain is described as a burning sensation, which intensifies upon touch. She experiences numbness in her hand, particularly when typing, and has difficulty lifting objects or carrying a tablet. This is her first experience with such symptoms. She recalls a similar bump following a roller coaster ride, but it was not associated with any pain. She has no history of fractures. She attempted to alleviate the current pain by wearing a brace, which provided temporary relief but resulted in increased pain upon removal. She has been managing the pain with ibuprofen.  - Onset: Approximately 2 weeks ago after dropping a scanner on her wrist.  - Location: Wrist, with numbness in the hand.  - Duration: Persistent for 2 weeks.  - Character: Burning sensation, painful bump, numbness in the hand.  - Alleviating/Aggravating Factors: Wearing a brace provided temporary relief but increased pain upon removal; ibuprofen has been used for pain management.  - Timing: Pain intensifies upon touch and during blood pressure measurements.  - Severity: Painful enough to cause difficulty lifting objects or carrying a tablet.    Objective:     Exam:  /74   Pulse 83   Temp 36.9 °C (98.4 °F) (Temporal)   Resp 16   Ht 1.626 m (5' 4\")   Wt 72 kg (158 lb 11.7 oz)   SpO2 99%   BMI 27.25 kg/m²  Body mass index is 27.25 kg/m².    Physical Exam  Constitutional:       General: She is not in acute distress.     Appearance: Normal appearance. She is not ill-appearing.   Eyes:      Conjunctiva/sclera: Conjunctivae normal.   Cardiovascular:      Rate and Rhythm: Normal rate and regular rhythm.      Heart " sounds: No murmur heard.  Pulmonary:      Effort: Pulmonary effort is normal. No respiratory distress.      Breath sounds: Normal breath sounds. No wheezing or rhonchi.   Musculoskeletal:      Right wrist: Swelling, effusion and tenderness present. No lacerations, snuff box tenderness or crepitus. Decreased range of motion.      Left wrist: Normal.      Comments: Pain with radial and ulnar deviation of the right hand as well as resisted extension indicating possible carpal bone involvement.  No snuffbox tenderness.   Skin:     General: Skin is warm and dry.      Capillary Refill: Capillary refill takes less than 2 seconds.   Neurological:      General: No focal deficit present.      Mental Status: She is alert and oriented to person, place, and time.   Psychiatric:         Mood and Affect: Mood normal.             Results      Assessment & Plan:     1. Right wrist pain  DX-WRIST-COMPLETE 3+ RIGHT          Assessment & Plan  1. Wrist pain: Acute.  - Order x-ray of the wrist to confirm diagnosis.  - Wear a wrist brace for a maximum of 2 weeks with proper usage instructions.  - Limit activity and apply ice to the affected area for 15 to 30 minutes several times daily.  - Take ibuprofen as needed for pain management.  - If x-ray reveals a significant fracture, refer to orthopedics before follow-up with primary care physician.  - If no significant fracture is found, continue limited activity for a few weeks.    Follow-up  - Follow-up appointment with primary care physician on the 28th.    No follow-ups on file.    Please note that this dictation was created using voice recognition software. I have made every reasonable attempt to correct obvious errors, but I expect that there are errors of grammar and possibly content that I did not discover before finalizing the note.

## 2025-05-28 ENCOUNTER — TELEMEDICINE (OUTPATIENT)
Dept: MEDICAL GROUP | Facility: PHYSICIAN GROUP | Age: 35
End: 2025-05-28
Payer: COMMERCIAL

## 2025-05-28 VITALS
DIASTOLIC BLOOD PRESSURE: 78 MMHG | HEIGHT: 64 IN | WEIGHT: 150 LBS | SYSTOLIC BLOOD PRESSURE: 124 MMHG | BODY MASS INDEX: 25.61 KG/M2

## 2025-05-28 DIAGNOSIS — I10 ESSENTIAL HYPERTENSION: Primary | ICD-10-CM

## 2025-05-28 DIAGNOSIS — M25.531 RIGHT WRIST PAIN: ICD-10-CM

## 2025-05-28 RX ORDER — METHYLPREDNISOLONE 4 MG/1
TABLET ORAL
Qty: 21 TABLET | Refills: 0 | Status: SHIPPED | OUTPATIENT
Start: 2025-05-28

## 2025-05-28 RX ORDER — LOSARTAN POTASSIUM 25 MG/1
12.5 TABLET ORAL DAILY
Qty: 50 TABLET | Refills: 3 | Status: SHIPPED | OUTPATIENT
Start: 2025-05-28

## 2025-05-28 ASSESSMENT — FIBROSIS 4 INDEX: FIB4 SCORE: 0.52

## 2025-05-28 NOTE — PROGRESS NOTES
Virtual Visit: Established Patient   This visit was conducted via Teams using secure and encrypted videoconferencing technology.   The patient was in a private location outside of their home in the state of [place].    The patient's identity was confirmed and verbal consent was obtained for this virtual visit.     Subjective:     CC:   Chief Complaint   Patient presents with    Hypertension Follow-up     Lisinopril causing dry cough    Wrist Pain     Still has pain.        Shara Patricia is a 35 y.o. female presenting for evaluation and management of:    Essential hypertension  Reported home /78. Continues lisinopril 2.5 mg daily but has noticed dry cough. Home BP with medication 120's/70. When she missed doses her BP was elevated as high as 156/110. She took 6 pills (15 mg)  to get BP down. She has not missed any doses since. No chest pain, shortness of breath or dizziness.  Plan to stop lisinopril and start losartan 12.5 mg daily.    Right wrist pain  The right wrist pain started 1 month ago. She dropped a scanner on her wrist while at the store. On 5/5/2025 she was moving and pain worsened. She had a bump develop on the dorsal side of hand near wrist. She has numbness and tingling to her fingers with burning sensation. She has tired Ibuprofen, Tylenol and Voltaren that has not helped. Wearing wrist brace helped initially but with movement of thumb pain worsens. The bump is still present and is firm and tingles when touching the bump with sharp pain. She has used ice and heat. Ice helps. Recent X-ray negative for fracture or dislocation, osteoarthritis.       ROS   Per HPI    Current medicines (including changes today)  Current Medications[1]    Patient Active Problem List    Diagnosis Date Noted    Right wrist pain 05/28/2025    Elevated fasting glucose 01/29/2025    Aphthous ulcer 12/31/2024    Nexplanon in place 12/10/2024    MARANDA (obstructive sleep apnea) 03/01/2023    Essential hypertension 10/13/2021     "PCOS (polycystic ovarian syndrome) 04/16/2021    Low vitamin D level 10/27/2018    Family history of diabetes mellitus type II 11/22/2017        Objective:   /78 Comment: home bp cuff  Ht 1.626 m (5' 4\")   Wt 68 kg (150 lb)   BMI 25.75 kg/m²   Vital signs reviewed     Physical Exam:  Constitutional: Alert, no distress, well-groomed.  Skin: No rashes in visible areas.  Eye: Round. Conjunctiva clear, lids normal. No icterus.   ENMT: Lips pink without lesions, good dentition, moist mucous membranes. Phonation normal.  Neck: No masses, no thyromegaly. Moves freely without pain.  Respiratory: Unlabored respiratory effort, no cough or audible wheeze  Psych: Alert and oriented x3, normal affect and mood.   Right wrist: No obvious deformity. Difficult to see bump with camera. +Finkelstein test.     Assessment and Plan:   The following treatment plan was discussed:     1. Essential hypertension (Primary)  Chronic exacerbated problem.  Stop lisinopril due to cough.  Start losartan 12.5 mg daily.  Discussed to send me a algrano message in 2 weeks if BP remains greater than 140/90.  She verbalized understanding.  Otherwise we will see her in office in 6 weeks.  Continue home BP monitoring.  - losartan (COZAAR) 25 MG Tab; Take 0.5 Tablets by mouth every day.  Dispense: 50 Tablet; Refill: 3    2. Right wrist pain  Acute uncomplicated problem.  She has tried resting, ice and ibuprofen without improvement.  Plan for Medrol Dosepak and referral to hand therapy.  We discussed if wrist feels better with the Medrol Dosepak will not need to schedule with hand therapy.  - methylPREDNISolone (MEDROL DOSEPAK) 4 MG Tablet Therapy Pack; As directed on the packaging label.  Dispense: 21 Tablet; Refill: 0  - Referral to Occupational Therapy    Follow-up: Return in about 6 weeks (around 7/9/2025) for Hypertension.    Educated in proper administration of medication(s) ordered today including safety, possible SE, risks, benefits, " rationale and alternatives to therapy.     Please note that this dictation was created using voice recognition software. I have made every reasonable attempt to correct obvious errors, but I expect that there are errors of grammar and possibly content that I did not discover before finalizing the note.                   [1]   Current Outpatient Medications   Medication Sig Dispense Refill    losartan (COZAAR) 25 MG Tab Take 0.5 Tablets by mouth every day. 50 Tablet 3    methylPREDNISolone (MEDROL DOSEPAK) 4 MG Tablet Therapy Pack As directed on the packaging label. 21 Tablet 0    metroNIDAZOLE (FLAGYL) 500 MG Tab  (Patient not taking: Reported on 5/28/2025)      ergocalciferol (DRISDOL) 88899 UNIT capsule Take 1 Capsule by mouth every 7 days. (Patient not taking: Reported on 5/28/2025) 12 Capsule 0     No current facility-administered medications for this visit.

## 2025-05-28 NOTE — ASSESSMENT & PLAN NOTE
Reported home /78. Continues lisinopril 2.5 mg daily but has noticed dry cough. Home BP with medication 120's/70. When she missed doses her BP was elevated as high as 156/110. She took 6 pills (15 mg)  to get BP down. She has not missed any doses since. No chest pain, shortness of breath or dizziness.  Plan to stop lisinopril and start losartan 12.5 mg daily.

## 2025-05-29 NOTE — ASSESSMENT & PLAN NOTE
The right wrist pain started 1 month ago. She dropped a scanner on her wrist while at the store. On 5/5/2025 she was moving and pain worsened. She had a bump develop on the dorsal side of hand near wrist. She has numbness and tingling to her fingers with burning sensation. She has tired Ibuprofen, Tylenol and Voltaren that has not helped. Wearing wrist brace helped initially but with movement of thumb pain worsens. The bump is still present and is firm and tingles when touching the bump with sharp pain. She has used ice and heat. Ice helps. Recent X-ray negative for fracture or dislocation, osteoarthritis.

## 2025-06-05 ENCOUNTER — PATIENT MESSAGE (OUTPATIENT)
Dept: MEDICAL GROUP | Facility: PHYSICIAN GROUP | Age: 35
End: 2025-06-05
Payer: COMMERCIAL

## 2025-06-05 DIAGNOSIS — M25.531 RIGHT WRIST PAIN: Primary | ICD-10-CM

## 2025-06-05 NOTE — Clinical Note
[facility]  [address] [address]     Donna 10, 2025    Patient: Shara Patricia   YOB: 1990   Date of Visit: 6/5/2025       To Whom It May Concern:    Shara Patricia was seen and treated in our department on 6/5/2025.     Sincerely,     JAYNE Hilton.

## 2025-06-05 NOTE — LETTER
Donna 10, 2025    To Whom It May Concern:         Shara AMADOR Am Is is a patient under my care. Due to acute right wrist pain, would not recommend lifting more that 10 pounds until evaluated by Sports Medicine.         If you have any questions please do not hesitate to call me at the phone number listed below.    Sincerely,    Amarilys Ricketts, GEOVANNY.P.R.N.  [phone]

## 2025-06-05 NOTE — Clinical Note
REFERRAL APPROVAL NOTICE         Sent on June 5, 2025                   Mikala AMADOR Am Is  [address]  [place] [address]                   Dear Ms. Lyle Is,    After a careful review of the medical information and benefit coverage, [facility] has processed your referral. See below for additional details.    If applicable, you must be actively enrolled with your insurance for coverage of the authorized service. If you have any questions regarding your coverage, please contact your insurance directly.    REFERRAL INFORMATION   Referral #:  [id number]  Referred-To Department    Referred-By Provider:  Occupational Therapy    CAITIE Hilton   [address]      [address]  [place] [address] [phone] [address]  [address]  [place] [id number] [phone]    Referral Start Date:  05/28/2025  Referral End Date:   05/28/2026             SCHEDULING  If you do not already have an appointment, please call [phone] to make an appointment.     MORE INFORMATION  If you do not already have a [employer] account, sign up at: [email or web address]  You can access your medical information, make appointments, see lab results, billing information, and more.  If you have questions regarding this referral, please contact  the [facility] Referrals department at:             [phone]. Monday - Friday 8:00AM - 5:00PM.     Sincerely,    [facility]

## 2025-06-11 NOTE — PROGRESS NOTES
Patient continues to have right wrist pain.  Referral to sports medicine.  Work note provided today and sent to Kuznech.

## 2025-06-12 ENCOUNTER — HOSPITAL ENCOUNTER (OUTPATIENT)
Dept: RADIOLOGY | Facility: MEDICAL CENTER | Age: 35
End: 2025-06-12
Attending: OBSTETRICS & GYNECOLOGY
Payer: COMMERCIAL

## 2025-06-12 DIAGNOSIS — N93.9 UTERINE BLEEDING: ICD-10-CM

## 2025-06-12 PROCEDURE — 76830 TRANSVAGINAL US NON-OB: CPT

## 2025-06-18 NOTE — Clinical Note
REFERRAL APPROVAL NOTICE         Sent on June 18, 2025                   Mikala AMADOR Am Is  [address]  [place] [address]                   Dear Ms. Lyle Is,    After a careful review of the medical information and benefit coverage, [facility] has processed your referral. See below for additional details.    If applicable, you must be actively enrolled with your insurance for coverage of the authorized service. If you have any questions regarding your coverage, please contact your insurance directly.    REFERRAL INFORMATION   Referral #:  [id number]  Referred-To Department    Referred-By Provider:  Sports Medicine    CAITIE Hilton   [address]      [address]  [place] [address]  [phone] [address]  [place] [address]  [phone]    Referral Start Date:  06/10/2025  Referral End Date:   06/10/2026             SCHEDULING  If you do not already have an appointment, please call [phone] to make an appointment.     MORE INFORMATION  If you do not already have a [employer] account, sign up at: [email or web address]  You can access your medical information, make appointments, see lab results, billing information, and more.  If you have questions regarding this referral, please contact  the [facility] Referrals department at:             [phone]. Monday - Friday 8:00AM - 5:00PM.     Sincerely,    [facility]

## 2025-06-20 NOTE — LETTER
"EMPLOYEE’S CLAIM FOR COMPENSATION/ REPORT OF INITIAL TREATMENT  FORM C-4    EMPLOYEE’S CLAIM - PROVIDE ALL INFORMATION REQUESTED   First Name  Shara Last Name  Am Is Birthdate                    1990                Sex  female Claim Number   Home Address  [address] [facility] Age  29 y.o. Height  1.626 m (5' 4\") Weight  63.5 kg (140 lb) [facility]     [facility] Zip  [address] Telephone  [phone] (home)    Mailing Address  [address] [facility] [facility] [place]  [address] Primary Language Spoken  English    Insurer  [employer] Third Party   [employer]   Employee's Occupation (Job Title) When Injury or Occupational Disease Occurred      Employer's Name  [employer]  Telephone  [phone]    Employer Address  [address] [place]  [place]  [address]    Date of Injury  4/28/2019               Hour of Injury  10:00 AM Date Employer Notified  4/29/2019 Last Day of Work after Injury or Occupational Disease  4/29/2019 Supervisor to Whom Injury Reported  Taj Vail   Address or Location of Accident (if applicable)  [[address] [facility]]   What were you doing at the time of accident? (if applicable)  Putting back filth bar    How did this injury or occupational disease occur? (Be specific an answer in detail. Use additional sheet if necessary)  Putting back filth bat, I guess bar was heavy while pulling it back I believe I pulled a muscle/tendon/nerve   If you believe that you have an occupational disease, when did you first have knowledge of the disability and it relationship to your employment?  n/a Witnesses to the Accident  n/a      Nature of Injury or Occupational Disease  Workers' Compensation  Part(s) of Body Injured or Affected  Shoulder (R), ,     I certify that the above is true and " correct to the best of my knowledge and that I have provided this information in order to obtain the benefits of [place]  I hereby authorize any physician, chiropractor, surgeon, practitioner, or other person, any hospital, including [facility] or [facility], any medical service organization, any insurance company, or other institution or organization to release to each other, any medical or other information, including benefits paid or payable, pertinent to this injury or disease, except information relative to diagnosis, treatment and/or counseling for AIDS, psychological conditions, alcohol or controlled substances, for which I must give specific authorization.  A Photostat of this authorization shall be as valid as the original.     Date 4/29/19   [facility]   Employee’s Signature   THIS REPORT MUST BE COMPLETED AND MAILED WITHIN 3 WORKING DAYS OF TREATMENT   Place  [facility]  Name of Facility  [facility]   Date  4/29/2019 Diagnosis  (S46.911A) Right shoulder strain, initial encounter  (S29.011A) Strain of right pectoralis muscle, initial encounter Is there evidence the injured employee was under the influence of alcohol and/or another controlled substance at the time of accident?   Hour  4:24 PM Description of Injury or Disease  Diagnoses of Right shoulder strain, initial encounter and Strain of right pectoralis muscle, initial encounter were pertinent to this visit. No   Treatment  NSAIDs, rest, heat, stretching.  Have you advised the patient to remain off work five days or more? No   X-Ray Findings      If Yes   From Date  To Date      From information given by the employee, together with medical evidence, can you directly connect this injury or occupational disease as job incurred?  Yes If No Full Duty  No Modified Duty  Yes   Is additional medical  "care by a physician indicated?  Yes If Modified Duty, Specify any Limitations / Restrictions  No use of right upper extremity.   Do you know of any previous injury or disease contributing to this condition or occupational disease?                            No   Date  4/29/2019 Print Doctor’s Name Renea Villalobos P.A.-C. I certify the employer’s copy of  this form was mailed on:   Address  [address] Insurer’s Use Only     [place] [place] [facility] Zip  [address]    Provider’s Tax ID Number  [id number] Telephone  Dept: [phone]        e-SignRENEA VILLALOBOS P.A.-C.   e-Signature: Dr. Chad Zarate, Medical Director Degree           ORIGINAL-TREATING PHYSICIAN OR CHIROPRACTOR    PAGE 2-INSURER/TPA    PAGE 3-EMPLOYER    PAGE 4-EMPLOYEE             Form C-4 (rev10/07)              BRIEF DESCRIPTION OF RIGHTS AND BENEFITS  (Pursuant to [place]RS 616C.050)    Notice of Injury or Occupational Disease (Incident Report Form C-1): If an injury or occupational disease (OD) arises out of and in the  course of employment, you must provide written notice to your employer as soon as practicable, but no later than 7 days after the accident or  OD. Your employer shall maintain a sufficient supply of the required forms.    Claim for Compensation (Form C-4): If medical treatment is sought, the form C-4 is available at the place of initial treatment. A completed  \"Claim for Compensation\" (Form C-4) must be filed within 90 days after an accident or OD. The treating physician or chiropractor must,  within 3 working days after treatment, complete and mail to the employer, the employer's insurer and third-party , the Claim for  Compensation.    Medical Treatment: If you require medical treatment for your on-the-job injury or OD, you may be required to select a physician or  chiropractor from a list provided by your workers’ compensation insurer, if it has contracted with an Organization for Managed Care (MCO) or  Preferred " Provider Organization (PPO) or providers of health care. If your employer has not entered into a contract with an MCO or PPO, you  may select a physician or chiropractor from the Panel of Physicians and Chiropractors. Any medical costs related to your industrial injury or  OD will be paid by your insurer.    Temporary Total Disability (TTD): If your doctor has certified that you are unable to work for a period of at least 5 consecutive days, or 5  cumulative days in a 20-day period, or places restrictions on you that your employer does not accommodate, you may be entitled to TTD  compensation.    Temporary Partial Disability (TPD): If the wage you receive upon reemployment is less than the compensation for TTD to which you are  entitled, the insurer may be required to pay you TPD compensation to make up the difference. TPD can only be paid for a maximum of 24  months.    Permanent Partial Disability (PPD): When your medical condition is stable and there is an indication of a PPD as a result of your injury or  OD, within 30 days, your insurer must arrange for an evaluation by a rating physician or chiropractor to determine the degree of your PPD. The  amount of your PPD award depends on the date of injury, the results of the PPD evaluation and your age and wage.    Permanent Total Disability (PTD): If you are medically certified by a treating physician or chiropractor as permanently and totally disabled  and have been granted a PTD status by your insurer, you are entitled to receive monthly benefits not to exceed 66 2/3% of your average  monthly wage. The amount of your PTD payments is subject to reduction if you previously received a PPD award.    Vocational Rehabilitation Services: You may be eligible for vocational rehabilitation services if you are unable to return to the job due to a  permanent physical impairment or permanent restrictions as a result of your injury or occupational disease.    Transportation and  Per Ericka Reimbursement: You may be eligible for travel expenses and per ericka associated with medical treatment.    Reopening: You may be able to reopen your claim if your condition worsens after claim closure.    Appeal Process: If you disagree with a written determination issued by the insurer or the insurer does not respond to your request, you may  appeal to the Department of Administration, , by following the instructions contained in your determination letter. You must  appeal the determination within 70 days from the date of the determination letter at [address], [address], [place], [place]  [address], or [address], [address], [place], [place] [address]. If you disagree with the  decision, you may appeal to the  Department of Administration, . You must file your appeal within 30 days from the date of the  decision  letter at [address], [address], [place], [place] [address], or [address], [place], [place] [address]. If you  disagree with a decision of an , you may file a petition for judicial review with the District Court. You must do so within 30  days of the Appeal Officer’s decision. You may be represented by an  at your own expense or you may contact the [facility] for possible  representation.    [place]  for Injured Workers (NAIW): If you disagree with a  decision, you may request that NAIW represent you  without charge at an  Hearing. For information regarding denial of benefits, you may contact the [facility] at: [address], [address] [address], [phone], or [address], [place] [address], [phone]    To File a Complaint with the Division: If you wish to file a complaint with the  of the Division of Industrial Relations (DIR),  please contact the Workers’ Compensation  Section, [address], [address], [place], [place] [address], telephone (647) [phone], or  [address], [address], [place], [place] [address], telephone [phone]) [phone].    For assistance with Workers’ Compensation Issues: you may contact the Office of the Governor Consumer Health Assistance, [address] [place] [address], [address], [place], [place] [address], Toll Free 1-961.894.3876, Web site: http://govcha.[facility].nv., E-mail  [email or web address].                                                                                                                                                                                                                                   __________________________________________________________________                                                                   ____4/29/19_____________                Employee Name / Signature                                                                                                                                                       Date                                                                                                                                                                                                     D-2 (rev. 10/07)                                                                                                                                                                                                                               Hopeless about or dissatisfied with provider or treatment

## 2025-06-30 ENCOUNTER — APPOINTMENT (OUTPATIENT)
Dept: SPORTS MEDICINE | Facility: OTHER | Age: 35
End: 2025-06-30
Payer: COMMERCIAL

## 2025-07-03 ENCOUNTER — TELEPHONE (OUTPATIENT)
Dept: HEALTH INFORMATION MANAGEMENT | Facility: OTHER | Age: 35
End: 2025-07-03
Payer: COMMERCIAL

## 2025-07-08 ENCOUNTER — OFFICE VISIT (OUTPATIENT)
Dept: MEDICAL GROUP | Facility: PHYSICIAN GROUP | Age: 35
End: 2025-07-08
Payer: COMMERCIAL

## 2025-07-08 VITALS
HEIGHT: 64 IN | BODY MASS INDEX: 26.98 KG/M2 | WEIGHT: 158 LBS | HEART RATE: 84 BPM | SYSTOLIC BLOOD PRESSURE: 134 MMHG | TEMPERATURE: 97.8 F | OXYGEN SATURATION: 98 % | DIASTOLIC BLOOD PRESSURE: 80 MMHG

## 2025-07-08 DIAGNOSIS — R79.89 LOW VITAMIN D LEVEL: ICD-10-CM

## 2025-07-08 DIAGNOSIS — M65.4 DE QUERVAIN'S TENOSYNOVITIS, RIGHT: ICD-10-CM

## 2025-07-08 DIAGNOSIS — I10 ESSENTIAL HYPERTENSION: Primary | ICD-10-CM

## 2025-07-08 DIAGNOSIS — M25.531 RIGHT WRIST PAIN: ICD-10-CM

## 2025-07-08 PROCEDURE — 99214 OFFICE O/P EST MOD 30 MIN: CPT | Performed by: NURSE PRACTITIONER

## 2025-07-08 PROCEDURE — 3075F SYST BP GE 130 - 139MM HG: CPT | Performed by: NURSE PRACTITIONER

## 2025-07-08 PROCEDURE — 3079F DIAST BP 80-89 MM HG: CPT | Performed by: NURSE PRACTITIONER

## 2025-07-08 RX ORDER — METHYLPREDNISOLONE 4 MG/1
TABLET ORAL
Qty: 21 TABLET | Refills: 0 | Status: SHIPPED | OUTPATIENT
Start: 2025-07-08

## 2025-07-08 RX ORDER — LOSARTAN POTASSIUM 25 MG/1
25 TABLET ORAL DAILY
Qty: 100 TABLET | Refills: 3 | Status: SHIPPED | OUTPATIENT
Start: 2025-07-08

## 2025-07-08 ASSESSMENT — FIBROSIS 4 INDEX: FIB4 SCORE: 0.52

## 2025-07-09 NOTE — ASSESSMENT & PLAN NOTE
She has completed 12 week course of your Calciferol 50,000 units weekly.  She can start over-the-counter vitamin D3 1000 units daily.

## 2025-07-09 NOTE — ASSESSMENT & PLAN NOTE
BP today 134/80. She has been taking losartan 25 mg daily tablet. Home 's/70's. She needs a new medication refill today. Losartan was started as she had dry cough with lisinopril. Cough has improved.

## 2025-07-09 NOTE — PROGRESS NOTES
"Subjective:     CC: Hypertension follow-up, wrist pain    HPI:   Shara presents today with the following:    Essential hypertension  BP today 134/80. She has been taking losartan 25 mg daily tablet. Home 's/70's. She needs a new medication refill today. Losartan was started as she had dry cough with lisinopril. Cough has improved.     Low vitamin D level  She has completed 12 week course of your Calciferol 50,000 units weekly.  She can start over-the-counter vitamin D3 1000 units daily.    Right wrist pain  She has followed up with [facility]. She is in a Velcro thumb spica wrist brace. Continues to have pain. At her last appointment she was to have a prescription for Medrol Dosepak but did not receive the prescription.  She has a referral in place for OT.  Has upcoming [facility] appointment end of the month.      Past Medical History[1]    Social History[2]    Current Medications and Prescriptions Ordered in Epic[3]    Allergies:  Crab and Lisinopril    Health Maintenance: Completed      Objective:     Vital signs reviewed  Exam:  /80 (BP Location: Right arm, Patient Position: Sitting, BP Cuff Size: Adult)   Pulse 84   Temp 36.6 °C (97.8 °F) (Temporal)   Ht 1.626 m (5' 4\")   Wt 71.7 kg (158 lb)   SpO2 98%   BMI 27.12 kg/m²  Body mass index is 27.12 kg/m².    Gen: Alert and oriented, No apparent distress.  Lungs: Normal effort, CTA bilaterally, no wheezes, rhonchi, or rales  CV: Regular rate and rhythm. No murmurs, rubs, or gallops.  Ext: Right wrist in Velcro thumb spica wrist brace     Assessment & Plan:     35 y.o. female with the following -     1. Essential hypertension (Primary)  Chronic stable problem.  Continue losartan 25 mg daily.  Continue home BP monitoring.  - losartan (COZAAR) 25 MG Tab; Take 1 Tablet by mouth every day.  Dispense: 100 Tablet; Refill: 3    2. Right wrist pain  3. De Quervain's tenosynovitis, right  Acute uncomplicated problem.  Start Medrol Dosepak.  Keep upcoming [facility] " appointment.  - methylPREDNISolone (MEDROL DOSEPAK) 4 MG Tablet Therapy Pack; As directed on the packaging label.  Dispense: 21 Tablet; Refill: 0    4. Low vitamin D level  Chronic stable problem.  Start over-the-counter vitamin D3 1000 units daily.      Return in about 1 year (around 7/8/2026) for annual.    Please note that this dictation was created using voice recognition software. I have made every reasonable attempt to correct obvious errors, but I expect that there are errors of grammar and possibly content that I did not discover before finalizing the note.             [1]   Past Medical History:  Diagnosis Date    Gestational diabetes     Head ache     PCOS (polycystic ovarian syndrome)     Preeclampsia    [2]   Social History  Tobacco Use    Smoking status: Never    Smokeless tobacco: Never   Vaping Use    Vaping status: Never Used   Substance Use Topics    Alcohol use: Not Currently     Comment: not for the last 3 years    Drug use: Never   [3]   Current Outpatient Medications Ordered in Epic   Medication Sig Dispense Refill    losartan (COZAAR) 25 MG Tab Take 1 Tablet by mouth every day. 100 Tablet 3    methylPREDNISolone (MEDROL DOSEPAK) 4 MG Tablet Therapy Pack As directed on the packaging label. 21 Tablet 0    meloxicam (MOBIC) 15 MG tablet Take 1 Tablet by mouth 1 time a day as needed for Moderate Pain. 30 Tablet 0     No current Epic-ordered facility-administered medications on file.

## 2025-07-09 NOTE — ASSESSMENT & PLAN NOTE
She has followed up with [facility]. She is in a Velcro thumb spica wrist brace. Continues to have pain. At her last appointment she was to have a prescription for Medrol Dosepak but did not receive the prescription.  She has a referral in place for OT.  Has upcoming [facility] appointment end of the month.

## (undated) DEVICE — CANISTER SUCTION 3000ML MECHANICAL FILTER AUTO SHUTOFF MEDI-VAC NONSTERILE LF DISP  (40EA/CA)

## (undated) DEVICE — DRESSING POST OP BORDER 4 X 10 (5EA/BX)

## (undated) DEVICE — KIT  I.V. START (100EA/CA)

## (undated) DEVICE — SPONGE SURGICAL PVP  IODINE L8 IN (20EA/CA)

## (undated) DEVICE — TRAY FOLEY CATHETER STATLOCK 16FR SURESTEP  (10EA/CA)

## (undated) DEVICE — SUTURE 0 36IN PDS + VIO CT-1 (36PK/BX)

## (undated) DEVICE — ADHESIVE DERMABOND HVD MINI (12EA/BX)

## (undated) DEVICE — CHLORAPREP 26 ML APPLICATOR - ORANGE TINT(25/CA)

## (undated) DEVICE — SUTURE 0 VICRYL PLUS CT-1 - 36 INCH (36/BX)

## (undated) DEVICE — MAT PATIENT POSITIONING PREVALON (10EA/CA)

## (undated) DEVICE — SOLUTION PLASMA-LYTE PH 7.4 INJ 1000ML  (14EA/CA)

## (undated) DEVICE — PACK C-SECTION (2EA/CA)

## (undated) DEVICE — HEAD HOLDER JUNIOR/ADULT

## (undated) DEVICE — KIT SKIN NOSE AND MOUTH PRE-OP (20/CA)

## (undated) DEVICE — TRAY SPINAL ANESTHESIA NON-SAFETY (10/CA)

## (undated) DEVICE — SUTUREABS02-0 CT1 27IN - (36EA/BX)

## (undated) DEVICE — GLOVE BIOGEL INDICATOR SZ 6 SURGICAL PF LTX -(50/BX)

## (undated) DEVICE — STAPLER SKIN DISP - (6/BX 10BX/CA) VISISTAT

## (undated) DEVICE — PENCIL ELECTSURG 10FT HLSTR - WITH BLADE (50EA/CA)

## (undated) DEVICE — BLANKET UNDERBODY FULL ACCES - (5/CA)

## (undated) DEVICE — WATER IRRIGATION STERILE 1000ML (12EA/CA)

## (undated) DEVICE — SUTURE 0 GUT-PLAIN (36PK/BX)

## (undated) DEVICE — GLOVE BIOGEL SZ 6 PF LATEX - (50EA/BX 4BX/CA)

## (undated) DEVICE — SUTURE ABSORBABLE MONOFILAMENT VIOLET MONOCRYL CT-1 L36 IN (36EA/BX)

## (undated) DEVICE — TUBING CLEARLINK DUO-VENT - C-FLO (48EA/CA)

## (undated) DEVICE — ELECTRODE DUAL RETURN W/ CORD - (50/PK)

## (undated) DEVICE — BLANKET STERILE CHICKIE FOR L&D (100/CA)

## (undated) DEVICE — SUTURE 4-0 MONOCRYL PLUS PS-1 - 27 INCH (36/BX)

## (undated) DEVICE — SODIUM CHL IRRIGATION 0.9% 1000ML (12EA/CA)

## (undated) DEVICE — SLEEVE, SEQUENTIAL CALF REG

## (undated) DEVICE — SET EXTENSION WITH 2 PORTS (48EA/CA) ***PART #2C8610 IS A SUBSTITUTE*****